# Patient Record
Sex: FEMALE | Race: BLACK OR AFRICAN AMERICAN | NOT HISPANIC OR LATINO | Employment: FULL TIME | URBAN - METROPOLITAN AREA
[De-identification: names, ages, dates, MRNs, and addresses within clinical notes are randomized per-mention and may not be internally consistent; named-entity substitution may affect disease eponyms.]

---

## 2018-10-07 ENCOUNTER — HOSPITAL ENCOUNTER (EMERGENCY)
Facility: HOSPITAL | Age: 23
Discharge: HOME | End: 2018-10-07
Attending: EMERGENCY MEDICINE | Admitting: EMERGENCY MEDICINE

## 2018-10-07 VITALS
BODY MASS INDEX: 23.78 KG/M2 | HEART RATE: 66 BPM | HEIGHT: 67 IN | SYSTOLIC BLOOD PRESSURE: 116 MMHG | RESPIRATION RATE: 16 BRPM | DIASTOLIC BLOOD PRESSURE: 77 MMHG | OXYGEN SATURATION: 100 % | TEMPERATURE: 97.4 F | WEIGHT: 151.5 LBS

## 2018-10-07 DIAGNOSIS — L30.9 ECZEMA OF BOTH HANDS: Primary | ICD-10-CM

## 2018-10-07 PROCEDURE — 99282 EMERGENCY DEPT VISIT SF MDM: CPT

## 2018-10-07 PROCEDURE — 25010000002 METHYLPREDNISOLONE PER 125 MG: Performed by: EMERGENCY MEDICINE

## 2018-10-07 PROCEDURE — 96372 THER/PROPH/DIAG INJ SC/IM: CPT

## 2018-10-07 RX ORDER — CEFIXIME 400 MG/1
400 CAPSULE ORAL DAILY
COMMUNITY
End: 2018-11-27

## 2018-10-07 RX ORDER — DIPHENHYDRAMINE HCL 25 MG
25 CAPSULE ORAL EVERY 6 HOURS PRN
Status: ON HOLD | COMMUNITY
End: 2020-06-04

## 2018-10-07 RX ORDER — METHYLPREDNISOLONE SODIUM SUCCINATE 125 MG/2ML
125 INJECTION, POWDER, LYOPHILIZED, FOR SOLUTION INTRAMUSCULAR; INTRAVENOUS ONCE
Status: COMPLETED | OUTPATIENT
Start: 2018-10-07 | End: 2018-10-07

## 2018-10-07 RX ORDER — VALACYCLOVIR HYDROCHLORIDE 500 MG/1
500 TABLET, FILM COATED ORAL DAILY
COMMUNITY
End: 2018-11-27 | Stop reason: SDUPTHER

## 2018-10-07 RX ORDER — METHYLPREDNISOLONE 4 MG/1
TABLET ORAL
Qty: 21 EACH | Refills: 0 | Status: SHIPPED | OUTPATIENT
Start: 2018-10-07 | End: 2018-11-27

## 2018-10-07 RX ADMIN — METHYLPREDNISOLONE SODIUM SUCCINATE 125 MG: 125 INJECTION, POWDER, FOR SOLUTION INTRAMUSCULAR; INTRAVENOUS at 20:12

## 2018-10-07 NOTE — DISCHARGE INSTRUCTIONS
Clean your hands with cool water and then pat your hands dry with a clean towel. Use cotton gloves when necessary.

## 2018-10-07 NOTE — ED PROVIDER NOTES
EMERGENCY DEPARTMENT ENCOUNTER    CHIEF COMPLAINT  Chief Complaint: rash  History given by: patient, mother  History limited by: nothing  Room Number:   PMD: Morro Laura MD      HPI:  Pt is a 22 y.o. female who presents complaining of a rash to her bilateral hands that has progressively becoming worse over the last 2 months. Pt states that her rash has become worse over the last 2-3 days. Pt states that her symptoms are similar to her prior eczema exacerbations but that it is more severe than her prior exacerbations. Pt denies fever, CP or SOA. Pt states that she has been taking fluocinonide ointment 2-3 times daily without relief or improvement of her rash. Pt states that she does wear gloves at work daily. LNMP started on 10/1/18  PCP started pt on Suprax on 10/4.    Duration/Onset/Timin months, gradual, constant  Location: bilateral hands  Radiation: none  Quality: rash  Intensity/Severity: moderate to severe  Associated Symptoms: none  Aggravating or Alleviating Factors: none  Previous Episodes:  Pt states that her symptoms are similar to her prior eczema exacerbations but that it is more severe than her prior exacerbations.       PAST MEDICAL HISTORY  Active Ambulatory Problems     Diagnosis Date Noted   • No Active Ambulatory Problems     Resolved Ambulatory Problems     Diagnosis Date Noted   • No Resolved Ambulatory Problems     Past Medical History:   Diagnosis Date   • Eczema of both hands        PAST SURGICAL HISTORY  History reviewed. No pertinent surgical history.    FAMILY HISTORY  History reviewed. No pertinent family history.    SOCIAL HISTORY  Social History     Social History   • Marital status: Single     Spouse name: N/A   • Number of children: N/A   • Years of education: N/A     Occupational History   • Not on file.     Social History Main Topics   • Smoking status: Never Smoker   • Smokeless tobacco: Never Used   • Alcohol use No   • Drug use: No   • Sexual activity: Defer      Other Topics Concern   • Not on file     Social History Narrative   • No narrative on file       ALLERGIES  Nuts; Amoxil [amoxicillin]; and Flagyl [metronidazole]    REVIEW OF SYSTEMS  Review of Systems   Constitutional: Negative for fever.   Respiratory: Negative for shortness of breath.    Cardiovascular: Negative for chest pain.   Skin: Positive for rash.       PHYSICAL EXAM  ED Triage Vitals [10/07/18 1910]   Temp Heart Rate Resp BP SpO2   97.4 °F (36.3 °C) 90 16 -- 100 %      Temp src Heart Rate Source Patient Position BP Location FiO2 (%)   Tympanic Monitor -- -- --       Physical Exam   Constitutional: No distress.   HENT:   Head: Normocephalic and atraumatic.   Mouth/Throat: Oropharynx is clear and moist.   No intraoral lesions   Cardiovascular: Regular rhythm.    Pulmonary/Chest: No respiratory distress.   Abdominal: There is no tenderness.   Musculoskeletal: She exhibits no tenderness.   Skin: Rash noted.   Skin thickening with moderate blistering and mild palmar ulcerations on bilateral hands. There is no clear cut evidence of infection. Exam is consistent with severe eczema   Nursing note and vitals reviewed.    PROCEDURES  Procedures      PROGRESS AND CONSULTS     1923- Notified pt and family that there is not a dermatologist that can come to the ED to evaluate the pt. Discussed the plan to administer a dose of steroids in the ED and to discharge the pt home with a prescription for steroids. I instructed the pt to clean her hands with cool water and then pat dry. D/w pt and family that I am concerned that her eczema has been exacerbated by wearing gloves at work. I instructed the pt to wear cloth gloves and to take the next several days off. I instructed the pt to call her dermatologist for follow up. Pt understands and agrees with the plan, all questions answered.    1931- Ordered solu-medrol for eczema.    MEDICAL DECISION MAKING  Results were reviewed/discussed with the patient and they were also  made aware of online access. Pt also made aware that follow up with PMD is necessary.     MDM  Number of Diagnoses or Management Options  Eczema of both hands:      Amount and/or Complexity of Data Reviewed  Decide to obtain previous medical records or to obtain history from someone other than the patient: yes  Obtain history from someone other than the patient: yes (mother)    Patient Progress  Patient progress: stable         DIAGNOSIS  Final diagnoses:   Eczema of both hands       DISPOSITION  DISCHARGE    Patient discharged in stable condition.    Reviewed implications of results, diagnosis, meds, responsibility to follow up, warning signs and symptoms of possible worsening, potential complications and reasons to return to ER.    Patient/Family voiced understanding of above instructions.    Discussed plan for discharge, as there is no emergent indication for admission. Patient referred to primary care provider for BP management due to today's BP. Pt/family is agreeable and understands need for follow up and repeat testing.  Pt is aware that discharge does not mean that nothing is wrong but it indicates no emergency is present that requires admission and they must continue care with follow-up as given below or physician of their choice.     FOLLOW-UP  Morro Laura MD  [address]  [facility] [address]  [facility] [id number] [phone]    Go to   as scheduled    Miguel Holguin MD  [address] [facility] [address]  [place] [id number]  [phone]      Call for Appointment         Medication List      New Prescriptions    MethylPREDNISolone 4 MG tablet  Commonly known as:  MEDROL (LISSETT)  Take as directed on package instructions.              Latest Documented Vital Signs:  As of 8:14 PM  BP- 108/73 HR- 97 Temp- 97.4 °F (36.3 °C) (Tympanic) O2 sat- 100%    --  Documentation assistance provided by michelle Abraham for Dr. Weaver.  Information recorded by the michelle was done at my direction and has been  verified and validated by me.     Irma Abraham  10/07/18 1942       Irma Abraham  10/07/18 2014       Omid Weaver MD  10/07/18 2015

## 2018-11-27 ENCOUNTER — OFFICE VISIT (OUTPATIENT)
Dept: SPORTS MEDICINE | Facility: CLINIC | Age: 23
End: 2018-11-27

## 2018-11-27 VITALS
DIASTOLIC BLOOD PRESSURE: 68 MMHG | WEIGHT: 153 LBS | HEART RATE: 79 BPM | BODY MASS INDEX: 24.01 KG/M2 | HEIGHT: 67 IN | SYSTOLIC BLOOD PRESSURE: 102 MMHG | OXYGEN SATURATION: 99 %

## 2018-11-27 DIAGNOSIS — A60.00 HERPES SIMPLEX INFECTION OF GENITOURINARY SYSTEM: ICD-10-CM

## 2018-11-27 DIAGNOSIS — G89.29 CHRONIC BILATERAL LOW BACK PAIN WITHOUT SCIATICA: ICD-10-CM

## 2018-11-27 DIAGNOSIS — M54.50 CHRONIC BILATERAL LOW BACK PAIN WITHOUT SCIATICA: ICD-10-CM

## 2018-11-27 DIAGNOSIS — L30.9 ECZEMA, UNSPECIFIED TYPE: Primary | ICD-10-CM

## 2018-11-27 DIAGNOSIS — Z91.018 NUT ALLERGY: ICD-10-CM

## 2018-11-27 DIAGNOSIS — L50.2 URTICARIA DUE TO COLD: ICD-10-CM

## 2018-11-27 PROCEDURE — 99204 OFFICE O/P NEW MOD 45 MIN: CPT | Performed by: FAMILY MEDICINE

## 2018-11-27 RX ORDER — NAPROXEN 500 MG/1
500 TABLET ORAL 2 TIMES DAILY PRN
Qty: 60 TABLET | Refills: 2 | Status: SHIPPED | OUTPATIENT
Start: 2018-11-27 | End: 2019-10-22

## 2018-11-27 RX ORDER — VALACYCLOVIR HYDROCHLORIDE 500 MG/1
500 TABLET, FILM COATED ORAL DAILY
Qty: 90 TABLET | Refills: 3 | Status: SHIPPED | OUTPATIENT
Start: 2018-11-27 | End: 2020-04-27

## 2018-11-27 RX ORDER — CETIRIZINE HYDROCHLORIDE 10 MG/1
10 TABLET ORAL DAILY
Qty: 90 TABLET | Refills: 3 | Status: SHIPPED | OUTPATIENT
Start: 2018-11-27 | End: 2020-03-13

## 2018-11-27 RX ORDER — EPINEPHRINE 0.3 MG/.3ML
INJECTION SUBCUTANEOUS
Qty: 1 EACH | Refills: 5 | Status: SHIPPED | OUTPATIENT
Start: 2018-11-27 | End: 2019-12-04 | Stop reason: SDUPTHER

## 2018-11-27 NOTE — PROGRESS NOTES
"Mary is a 22 y.o. year old female    Chief Complaint   Patient presents with   • Establish Care     need referral for allergist-epi pen       History of Present Illness   HPI   1. Chronic eczema in hands. Possible worsened with cold as well - cold urticaria. Established with derm as well - using zyrtec and pepcid as well. Also something else - maybe steroid.     2. Intermittent nausea and trouble with holding her appetite.    3. Needs to see gyn - normal screening plus history of stds to follow up on    4. Nut allergy - needs new epi-pen    5. H/o genital herpes - stable with suppression on valtrex, no outbreak in over a year    6. Chronic low back pain.  Bilateral, up and down the back.  Worse with twisting motions.    I have reviewed the patient's medical, family, and social history in detail and updated the computerized patient record.    Review of Systems   Constitutional: Negative.    Respiratory: Negative.    Cardiovascular: Negative.    Psychiatric/Behavioral: Negative.    All other systems reviewed and are negative.      /68   Pulse 79   Ht 170.2 cm (67\")   Wt 69.4 kg (153 lb)   LMP  (Within Weeks)   SpO2 99%   Breastfeeding? No   BMI 23.96 kg/m²      Physical Exam   Constitutional: She is oriented to person, place, and time. She appears well-developed and well-nourished.   Neck: Normal range of motion.   Cardiovascular: Normal rate, regular rhythm and normal heart sounds.   Pulmonary/Chest: Effort normal and breath sounds normal.   Musculoskeletal:   Back exam shows some very mild scoliosis with mild bilateral paraspinal tenderness.  Normal range of motion.   Neurological: She is alert and oriented to person, place, and time.   Skin: Skin is warm and dry.   Chronic eczematous changes bilateral hands with some plaque formation   Psychiatric: She has a normal mood and affect.   Vitals reviewed.       Diagnoses and all orders for this visit:    Eczema, unspecified type  -     cetirizine (zyrTEC) " 10 MG tablet; Take 1 tablet by mouth Daily.    Nut allergy  -     EPINEPHrine (EPIPEN 2-LISSETT) 0.3 MG/0.3ML solution auto-injector injection; Use as directed as needed for allergic reaction    Urticaria due to cold  -     cetirizine (zyrTEC) 10 MG tablet; Take 1 tablet by mouth Daily.    Herpes simplex infection of genitourinary system  -     valACYclovir (VALTREX) 500 MG tablet; Take 1 tablet by mouth Daily.  -     Ambulatory Referral to Obstetrics / Gynecology    Chronic bilateral low back pain without sciatica  -     naproxen (NAPROSYN) 500 MG tablet; Take 1 tablet by mouth 2 (Two) Times a Day As Needed for Moderate Pain .    Other orders  -     Crisaborole (EUCRISA) 2 % ointment; Apply  topically.       Recommend continued follow up with dermatology regarding eczema.  Continue nut avoidance.  EpiPen as needed.  Continue Valtrex for herpes suppression.  Recommend establish care with OB/GYN for long-term follow-up, screening, etc.  Back pain appears benign.  Naproxen when necessary.      EMR Dragon/Transcription disclaimer:    Much of this encounter note is an electronic transcription/translation of spoken language to printed text.  The electronic translation of spoken language may permit erroneous, or at times, nonsensical words or phrases to be inadvertently transcribed.  Although I have reviewed the note for such errors some may still exist.

## 2019-05-08 ENCOUNTER — OFFICE VISIT (OUTPATIENT)
Dept: SPORTS MEDICINE | Facility: CLINIC | Age: 24
End: 2019-05-08

## 2019-05-08 VITALS
SYSTOLIC BLOOD PRESSURE: 102 MMHG | HEIGHT: 67 IN | WEIGHT: 159 LBS | BODY MASS INDEX: 24.96 KG/M2 | DIASTOLIC BLOOD PRESSURE: 50 MMHG

## 2019-05-08 DIAGNOSIS — R41.840 DIFFICULTY CONCENTRATING: ICD-10-CM

## 2019-05-08 DIAGNOSIS — F41.9 ANXIETY: Primary | ICD-10-CM

## 2019-05-08 PROCEDURE — 99214 OFFICE O/P EST MOD 30 MIN: CPT | Performed by: FAMILY MEDICINE

## 2019-05-08 RX ORDER — HYDROXYZINE HYDROCHLORIDE 10 MG/1
10 TABLET, FILM COATED ORAL
COMMUNITY
End: 2019-10-22

## 2019-05-08 RX ORDER — CLOBETASOL PROPIONATE 0.5 MG/G
OINTMENT TOPICAL
COMMUNITY
Start: 2018-10-11 | End: 2019-10-22

## 2019-05-08 NOTE — PROGRESS NOTES
"Mary is a 23 y.o. year old female evaluation of a problem that is new to this examiner.    Chief Complaint   Patient presents with   • Anxiety     X 3 months,        History of Present Illness   HPI   Here today for complaint of anxiety.  Generalized, gradually worsening for a few months.  She states that she feels like she cannot sleep or rest, feels \"wired \"and fidgety all the time.  Her mood tends to oscillate up and down easily.  Associated with difficulty concentration.  Denies any menstrual or GI abnormalities.  She has chronic urticaria that is slightly worse than usual.  She reports some stress related to moving here from [place], but that was in August of last year and seems to be getting better.  She does admit to rather routine use of marijuana to help with this.  Denies use of other illicit substances.    I have reviewed the patient's medical, family, and social history in detail and updated the computerized patient record.    Review of Systems   Constitutional: Positive for appetite change. Negative for chills and fever.   Eyes: Negative.    Cardiovascular: Negative for palpitations.   Gastrointestinal: Negative for constipation and diarrhea.   Endocrine: Negative for cold intolerance and heat intolerance.   Skin: Positive for rash.   Psychiatric/Behavioral: The patient is nervous/anxious.        /50 (BP Location: Left arm, Patient Position: Sitting, Cuff Size: Adult)   Ht 170.2 cm (67.01\")   Wt 72.1 kg (159 lb)   BMI 24.90 kg/m²      Physical Exam   Constitutional: She appears well-developed and well-nourished.   HENT:   Mouth/Throat: Oropharynx is clear and moist.   Eyes: Conjunctivae and EOM are normal. Pupils are equal, round, and reactive to light.   Neck: Normal range of motion. No thyromegaly present.   Cardiovascular: Normal rate, regular rhythm and normal heart sounds.   Pulmonary/Chest: Effort normal and breath sounds normal.   Lymphadenopathy:     She has no cervical adenopathy. "   Psychiatric: Her mood appears anxious. Her speech is not delayed and not slurred. She is hyperactive. Thought content is not paranoid and not delusional. Cognition and memory are normal.   Speech is somewhat rapid but not pressured.  Mildly circumferential.   Nursing note and vitals reviewed.        Current Outpatient Medications:   •  cetirizine (zyrTEC) 10 MG tablet, Take 1 tablet by mouth Daily., Disp: 90 tablet, Rfl: 3  •  clobetasol (TEMOVATE) 0.05 % ointment, , Disp: , Rfl:   •  Crisaborole (EUCRISA) 2 % ointment, Apply  topically., Disp: , Rfl:   •  diphenhydrAMINE (BENADRYL) 25 mg capsule, Take 25 mg by mouth Every 6 (Six) Hours As Needed for Itching., Disp: , Rfl:   •  EPINEPHrine (EPIPEN 2-LISSETT) 0.3 MG/0.3ML solution auto-injector injection, Use as directed as needed for allergic reaction, Disp: 1 each, Rfl: 5  •  naproxen (NAPROSYN) 500 MG tablet, Take 1 tablet by mouth 2 (Two) Times a Day As Needed for Moderate Pain ., Disp: 60 tablet, Rfl: 2  •  valACYclovir (VALTREX) 500 MG tablet, Take 1 tablet by mouth Daily., Disp: 90 tablet, Rfl: 3  •  hydrOXYzine (ATARAX) 10 MG tablet, Take 10 mg by mouth., Disp: , Rfl:      Diagnoses and all orders for this visit:    Anxiety  -     CBC & Differential  -     Comprehensive Metabolic Panel  -     T4, Free  -     TSH  -     ToxASSURE Select 13 (MW) - Urine, Clean Catch  -     Urinalysis With Culture If Indicated - Urine, Clean Catch  -     Thyroid Antibodies  -     Ambulatory Referral to Psychiatry    Difficulty concentrating  -     CBC & Differential  -     Comprehensive Metabolic Panel  -     T4, Free  -     TSH  -     ToxASSURE Select 13 (MW) - Urine, Clean Catch  -     Urinalysis With Culture If Indicated - Urine, Clean Catch  -     Thyroid Antibodies  -     Ambulatory Referral to Psychiatry    Other orders  -     clobetasol (TEMOVATE) 0.05 % ointment  -     hydrOXYzine (ATARAX) 10 MG tablet; Take 10 mg by mouth.       She has features of anxiety, ADD, and even  some mild bipolar warning signs with her high energy level and limited sleep.  We will do some basic labs to rule out physiologic cause, but I would like to have her see psychiatry to be more cautious with managing this.      EMR Dragon/Transcription disclaimer:    Much of this encounter note is an electronic transcription/translation of spoken language to printed text.  The electronic translation of spoken language may permit erroneous, or at times, nonsensical words or phrases to be inadvertently transcribed.  Although I have reviewed the note for such errors some may still exist.

## 2019-05-15 LAB
ALBUMIN SERPL-MCNC: 4.3 G/DL (ref 3.5–5.2)
ALBUMIN/GLOB SERPL: 1.4 G/DL
ALP SERPL-CCNC: 52 U/L (ref 39–117)
ALT SERPL-CCNC: 14 U/L (ref 1–33)
APPEARANCE UR: CLEAR
AST SERPL-CCNC: 18 U/L (ref 1–32)
BACTERIA #/AREA URNS HPF: ABNORMAL /HPF
BASOPHILS # BLD AUTO: 0.02 10*3/MM3 (ref 0–0.2)
BASOPHILS NFR BLD AUTO: 0.3 % (ref 0–1.5)
BILIRUB SERPL-MCNC: 0.3 MG/DL (ref 0.2–1.2)
BILIRUB UR QL STRIP: NEGATIVE
BUN SERPL-MCNC: 12 MG/DL (ref 6–20)
BUN/CREAT SERPL: 13.3 (ref 7–25)
CALCIUM SERPL-MCNC: 9.8 MG/DL (ref 8.6–10.5)
CHLORIDE SERPL-SCNC: 101 MMOL/L (ref 98–107)
CO2 SERPL-SCNC: 24.7 MMOL/L (ref 22–29)
COLOR UR: YELLOW
CREAT SERPL-MCNC: 0.9 MG/DL (ref 0.57–1)
DRUGS UR: NORMAL
EOSINOPHIL # BLD AUTO: 0.19 10*3/MM3 (ref 0–0.4)
EOSINOPHIL NFR BLD AUTO: 3.2 % (ref 0.3–6.2)
EPI CELLS #/AREA URNS HPF: >10 /HPF
ERYTHROCYTE [DISTWIDTH] IN BLOOD BY AUTOMATED COUNT: 11.8 % (ref 12.3–15.4)
GLOBULIN SER CALC-MCNC: 3.1 GM/DL
GLUCOSE SERPL-MCNC: 89 MG/DL (ref 65–99)
GLUCOSE UR QL: NEGATIVE
HCT VFR BLD AUTO: 37.7 % (ref 34–46.6)
HGB BLD-MCNC: 12.4 G/DL (ref 12–15.9)
HGB UR QL STRIP: NEGATIVE
IMM GRANULOCYTES # BLD AUTO: 0.02 10*3/MM3 (ref 0–0.05)
IMM GRANULOCYTES NFR BLD AUTO: 0.3 % (ref 0–0.5)
KETONES UR QL STRIP: NEGATIVE
LEUKOCYTE ESTERASE UR QL STRIP: NEGATIVE
LYMPHOCYTES # BLD AUTO: 2.36 10*3/MM3 (ref 0.7–3.1)
LYMPHOCYTES NFR BLD AUTO: 39.3 % (ref 19.6–45.3)
MCH RBC QN AUTO: 30.8 PG (ref 26.6–33)
MCHC RBC AUTO-ENTMCNC: 32.9 G/DL (ref 31.5–35.7)
MCV RBC AUTO: 93.8 FL (ref 79–97)
MICRO URNS: NORMAL
MICRO URNS: NORMAL
MONOCYTES # BLD AUTO: 0.45 10*3/MM3 (ref 0.1–0.9)
MONOCYTES NFR BLD AUTO: 7.5 % (ref 5–12)
MUCOUS THREADS URNS QL MICRO: PRESENT /HPF
NEUTROPHILS # BLD AUTO: 2.96 10*3/MM3 (ref 1.7–7)
NEUTROPHILS NFR BLD AUTO: 49.4 % (ref 42.7–76)
NITRITE UR QL STRIP: NEGATIVE
NRBC BLD AUTO-RTO: 0 /100 WBC (ref 0–0.2)
PH UR STRIP: 6.5 [PH] (ref 5–7.5)
PLATELET # BLD AUTO: 203 10*3/MM3 (ref 140–450)
POTASSIUM SERPL-SCNC: 4.7 MMOL/L (ref 3.5–5.2)
PROT SERPL-MCNC: 7.4 G/DL (ref 6–8.5)
PROT UR QL STRIP: NORMAL
RBC # BLD AUTO: 4.02 10*6/MM3 (ref 3.77–5.28)
RBC #/AREA URNS HPF: ABNORMAL /HPF
SODIUM SERPL-SCNC: 139 MMOL/L (ref 136–145)
SP GR UR: 1.03 (ref 1–1.03)
T4 FREE SERPL-MCNC: 1.23 NG/DL (ref 0.93–1.7)
THYROGLOB AB SERPL-ACNC: <1 IU/ML (ref 0–0.9)
THYROPEROXIDASE AB SERPL-ACNC: 10 IU/ML (ref 0–34)
TSH SERPL DL<=0.005 MIU/L-ACNC: 1.09 MIU/ML (ref 0.27–4.2)
URINALYSIS REFLEX: NORMAL
UROBILINOGEN UR STRIP-MCNC: 0.2 MG/DL (ref 0.2–1)
WBC # BLD AUTO: 6 10*3/MM3 (ref 3.4–10.8)
WBC #/AREA URNS HPF: ABNORMAL /HPF

## 2019-10-10 ENCOUNTER — HOSPITAL ENCOUNTER (OUTPATIENT)
Dept: ULTRASOUND IMAGING | Facility: HOSPITAL | Age: 24
Discharge: HOME | End: 2019-10-10
Admitting: FAMILY MEDICINE

## 2019-10-10 ENCOUNTER — OFFICE VISIT (OUTPATIENT)
Dept: SPORTS MEDICINE | Facility: CLINIC | Age: 24
End: 2019-10-10

## 2019-10-10 ENCOUNTER — LAB (OUTPATIENT)
Dept: LAB | Facility: HOSPITAL | Age: 24
End: 2019-10-10

## 2019-10-10 VITALS
WEIGHT: 148 LBS | SYSTOLIC BLOOD PRESSURE: 112 MMHG | HEIGHT: 67 IN | BODY MASS INDEX: 23.23 KG/M2 | HEART RATE: 72 BPM | OXYGEN SATURATION: 98 % | DIASTOLIC BLOOD PRESSURE: 72 MMHG

## 2019-10-10 DIAGNOSIS — Z3A.01 LESS THAN 8 WEEKS GESTATION OF PREGNANCY: Primary | ICD-10-CM

## 2019-10-10 DIAGNOSIS — N83.201 RIGHT OVARIAN CYST: ICD-10-CM

## 2019-10-10 DIAGNOSIS — Z3A.01 LESS THAN 8 WEEKS GESTATION OF PREGNANCY: ICD-10-CM

## 2019-10-10 DIAGNOSIS — O26.891 ABDOMINAL PAIN IN PREGNANCY, FIRST TRIMESTER: ICD-10-CM

## 2019-10-10 DIAGNOSIS — R10.30 LOWER ABDOMINAL PAIN: Primary | ICD-10-CM

## 2019-10-10 DIAGNOSIS — R10.9 ABDOMINAL PAIN IN PREGNANCY, FIRST TRIMESTER: ICD-10-CM

## 2019-10-10 DIAGNOSIS — R10.30 LOWER ABDOMINAL PAIN: ICD-10-CM

## 2019-10-10 DIAGNOSIS — R10.9 ABDOMINAL PAIN, UNSPECIFIED ABDOMINAL LOCATION: ICD-10-CM

## 2019-10-10 LAB
B-HCG UR QL: POSITIVE
HCG INTACT+B SERPL-ACNC: NORMAL MIU/ML
INTERNAL NEGATIVE CONTROL: NEGATIVE
INTERNAL POSITIVE CONTROL: POSITIVE
Lab: ABNORMAL

## 2019-10-10 PROCEDURE — 36415 COLL VENOUS BLD VENIPUNCTURE: CPT

## 2019-10-10 PROCEDURE — 93976 VASCULAR STUDY: CPT

## 2019-10-10 PROCEDURE — 81025 URINE PREGNANCY TEST: CPT | Performed by: FAMILY MEDICINE

## 2019-10-10 PROCEDURE — 76815 OB US LIMITED FETUS(S): CPT

## 2019-10-10 PROCEDURE — 76817 TRANSVAGINAL US OBSTETRIC: CPT

## 2019-10-10 PROCEDURE — 99214 OFFICE O/P EST MOD 30 MIN: CPT | Performed by: FAMILY MEDICINE

## 2019-10-10 PROCEDURE — 84702 CHORIONIC GONADOTROPIN TEST: CPT

## 2019-10-10 RX ORDER — ONDANSETRON 4 MG/1
4 TABLET, ORALLY DISINTEGRATING ORAL EVERY 8 HOURS PRN
Qty: 10 TABLET | Refills: 0 | Status: SHIPPED | OUTPATIENT
Start: 2019-10-10 | End: 2019-10-14 | Stop reason: SDUPTHER

## 2019-10-10 NOTE — PROGRESS NOTES
"Mary is a 23 y.o. year old female evaluation of a problem that is new to this examiner.    Chief Complaint   Patient presents with   • Abdominal Cramping     x 4 days    • Constipation   • Nausea     some vomitting       History of Present Illness   HPI   Lower abdominal pain, cramping in nature, for a few days as well as nausea with vomiting. Hungry but vomiting.  Moderately severe.  LMP end of August - positive pregnancy test Friday.   Cramping is severe, feels like a menstrual cramp. Intermittent.   Assoc with hot flashing symptoms but not a full fever.     I have reviewed the patient's medical, family, and social history in detail and updated the computerized patient record.    Review of Systems   Constitutional: Negative.    Respiratory: Negative.    Genitourinary: Positive for pelvic pain.   Psychiatric/Behavioral: Negative.        /72   Pulse 72   Ht 170.2 cm (67.01\")   Wt 67.1 kg (148 lb)   SpO2 98%   BMI 23.17 kg/m²      Physical Exam   Constitutional: She is oriented to person, place, and time. She appears well-developed and well-nourished.   HENT:   Mouth/Throat: Oropharynx is clear and moist.   Pulmonary/Chest: Effort normal.   Abdominal: Soft. Bowel sounds are normal. There is no hepatosplenomegaly. There is tenderness in the right lower quadrant, suprapubic area and left lower quadrant. There is no rigidity, no rebound, no guarding, no CVA tenderness, no tenderness at McBurney's point and negative Ramirez's sign.   Neurological: She is alert and oriented to person, place, and time.   Psychiatric:   Anxious but not in distress   Nursing note and vitals reviewed.        Current Outpatient Medications:   •  cetirizine (zyrTEC) 10 MG tablet, Take 1 tablet by mouth Daily., Disp: 90 tablet, Rfl: 3  •  clobetasol (TEMOVATE) 0.05 % ointment, , Disp: , Rfl:   •  Crisaborole (EUCRISA) 2 % ointment, Apply  topically., Disp: , Rfl:   •  diphenhydrAMINE (BENADRYL) 25 mg capsule, Take 25 mg by mouth Every " 6 (Six) Hours As Needed for Itching., Disp: , Rfl:   •  EPINEPHrine (EPIPEN 2-LISSETT) 0.3 MG/0.3ML solution auto-injector injection, Use as directed as needed for allergic reaction, Disp: 1 each, Rfl: 5  •  hydrOXYzine (ATARAX) 10 MG tablet, Take 10 mg by mouth., Disp: , Rfl:   •  naproxen (NAPROSYN) 500 MG tablet, Take 1 tablet by mouth 2 (Two) Times a Day As Needed for Moderate Pain ., Disp: 60 tablet, Rfl: 2  •  valACYclovir (VALTREX) 500 MG tablet, Take 1 tablet by mouth Daily., Disp: 90 tablet, Rfl: 3  •  ondansetron ODT (ZOFRAN ODT) 4 MG disintegrating tablet, Take 1 tablet by mouth Every 8 (Eight) Hours As Needed for Nausea or Vomiting., Disp: 10 tablet, Rfl: 0     Diagnoses and all orders for this visit:    Lower abdominal pain  -     POCT pregnancy, urine  -     Cancel: US pelvis complete; Future  -     HCG, B-subunit, Quantitative  -     Cancel: US non-ob transvaginal; Future  -     US testicular or ovarian vascular limited; Future    Less than 8 weeks gestation of pregnancy  -     Cancel: US pelvis complete; Future  -     HCG, B-subunit, Quantitative  -     Cancel: US non-ob transvaginal; Future  -     US testicular or ovarian vascular limited; Future    Other orders  -     ondansetron ODT (ZOFRAN ODT) 4 MG disintegrating tablet; Take 1 tablet by mouth Every 8 (Eight) Hours As Needed for Nausea or Vomiting.       Urine pregnancy test positive.  We will arrange a stat pelvic ultrasound to evaluate for possible impending spontaneous  versus ectopic pregnancy.  Also quantitative hCG.  We will arrange definitive obstetric care as well.  In the interim we will use minimal Zofran for nausea relief and Tylenol.      EMR Dragon/Transcription disclaimer:    Much of this encounter note is an electronic transcription/translation of spoken language to printed text.  The electronic translation of spoken language may permit erroneous, or at times, nonsensical words or phrases to be inadvertently transcribed.   Although I have reviewed the note for such errors some may still exist.

## 2019-10-11 ENCOUNTER — TELEPHONE (OUTPATIENT)
Dept: SPORTS MEDICINE | Facility: CLINIC | Age: 24
End: 2019-10-11

## 2019-10-11 LAB — HCG INTACT+B SERPL-ACNC: NORMAL MIU/ML

## 2019-10-11 NOTE — TELEPHONE ENCOUNTER
Pt's mother called for pt, states she is having constipation would like to know what she can take for this please advise. KENISHA

## 2019-10-14 ENCOUNTER — TELEPHONE (OUTPATIENT)
Dept: OBSTETRICS AND GYNECOLOGY | Age: 24
End: 2019-10-14

## 2019-10-14 RX ORDER — ONDANSETRON 4 MG/1
TABLET, ORALLY DISINTEGRATING ORAL
Qty: 10 TABLET | Refills: 0 | Status: SHIPPED | OUTPATIENT
Start: 2019-10-14 | End: 2019-10-22 | Stop reason: SDUPTHER

## 2019-10-14 NOTE — TELEPHONE ENCOUNTER
Pt scheduled for new gyn/preg confirmation 10/22/19 at 945 & u/s at 930. Pt states her discomfort has subsided but wanted to be seen as possible. LMP 8/25/19    Referral received for NGYN pt less than 8 wks gestational, h/o right cyst w discomfort.    Referred by:  Morro Guan    Pt was scheduled w [facility] and plans to cancel that appt as she perfers to seek ob care w [facility] instead.

## 2019-10-22 ENCOUNTER — OFFICE VISIT (OUTPATIENT)
Dept: OBSTETRICS AND GYNECOLOGY | Age: 24
End: 2019-10-22

## 2019-10-22 ENCOUNTER — PROCEDURE VISIT (OUTPATIENT)
Dept: OBSTETRICS AND GYNECOLOGY | Age: 24
End: 2019-10-22

## 2019-10-22 ENCOUNTER — TELEPHONE (OUTPATIENT)
Dept: OBSTETRICS AND GYNECOLOGY | Age: 24
End: 2019-10-22

## 2019-10-22 VITALS
HEIGHT: 67 IN | DIASTOLIC BLOOD PRESSURE: 74 MMHG | WEIGHT: 149 LBS | BODY MASS INDEX: 23.39 KG/M2 | SYSTOLIC BLOOD PRESSURE: 104 MMHG

## 2019-10-22 DIAGNOSIS — Z11.3 SCREEN FOR STD (SEXUALLY TRANSMITTED DISEASE): ICD-10-CM

## 2019-10-22 DIAGNOSIS — O98.319 GENITAL HERPES AFFECTING PREGNANCY, ANTEPARTUM: ICD-10-CM

## 2019-10-22 DIAGNOSIS — Z11.4 ENCOUNTER FOR SCREENING FOR HIV: ICD-10-CM

## 2019-10-22 DIAGNOSIS — Z36.9 ANTENATAL SCREENING ENCOUNTER: ICD-10-CM

## 2019-10-22 DIAGNOSIS — Z3A.01 7 WEEKS GESTATION OF PREGNANCY: ICD-10-CM

## 2019-10-22 DIAGNOSIS — Z34.01 ENCOUNTER FOR SUPERVISION OF NORMAL FIRST PREGNANCY IN FIRST TRIMESTER: Primary | ICD-10-CM

## 2019-10-22 DIAGNOSIS — A60.09 GENITAL HERPES AFFECTING PREGNANCY, ANTEPARTUM: ICD-10-CM

## 2019-10-22 DIAGNOSIS — N83.201 CYST OF RIGHT OVARY: ICD-10-CM

## 2019-10-22 DIAGNOSIS — O36.80X0 ENCOUNTER TO DETERMINE FETAL VIABILITY OF PREGNANCY, SINGLE OR UNSPECIFIED FETUS: Primary | ICD-10-CM

## 2019-10-22 PROCEDURE — 99203 OFFICE O/P NEW LOW 30 MIN: CPT | Performed by: OBSTETRICS & GYNECOLOGY

## 2019-10-22 PROCEDURE — 76817 TRANSVAGINAL US OBSTETRIC: CPT | Performed by: OBSTETRICS & GYNECOLOGY

## 2019-10-22 RX ORDER — TRIAMCINOLONE ACETONIDE 1 MG/G
CREAM TOPICAL
Refills: 1 | COMMUNITY
Start: 2019-10-14 | End: 2019-10-22

## 2019-10-22 RX ORDER — PRENATAL VIT/IRON FUM/FOLIC AC 27MG-0.8MG
TABLET ORAL DAILY
Status: ON HOLD | COMMUNITY
End: 2020-06-04

## 2019-10-22 RX ORDER — ONDANSETRON 4 MG/1
TABLET, ORALLY DISINTEGRATING ORAL
Qty: 10 TABLET | Refills: 0 | Status: ON HOLD | OUTPATIENT
Start: 2019-10-22 | End: 2020-06-04

## 2019-10-22 NOTE — PROGRESS NOTES
GYN Visit    10/22/2019    Patient: Mary Sun          MR#:[id number]      Chief Complaint   Patient presents with   • Possible Pregnancy     U/S WITH PURA.  LMP 2019.  KELI 2020.  7 WEEKS GESTATION.        History of Present Illness    23 y.o. female  who presents for  New pt , new pregnancy confirmation  Some nausea and fatigue  One episode of pelvic pain that resolved  Here with mother  FOB not involved  History of herpes and does take valtrex as needed  Discussed daily valtrex at the end of pregnancy  Pap last year normal per pt report    Works as [employer]          Patient's last menstrual period was 2019.    ________________________________________  Patient Active Problem List   Diagnosis   • Genital herpes affecting pregnancy, antepartum       Past Medical History:   Diagnosis Date   • Eczema of both hands        History reviewed. No pertinent surgical history.    Social History     Tobacco Use   Smoking Status Former Smoker   Smokeless Tobacco Never Used   Tobacco Comment    occasional use        has a current medication list which includes the following prescription(s): cetirizine, ondansetron odt, prenatal vitamin 27-0.8, valacyclovir, diphenhydramine, and epinephrine.  ________________________________________    Current contraception: none      The following portions of the patient's history were reviewed and updated as appropriate: allergies, current medications, past family history, past medical history, past social history, past surgical history and problem list.    Review of Systems   Constitutional: Positive for fatigue.   Gastrointestinal: Positive for nausea. Negative for vomiting.   Genitourinary: Negative for pelvic pain, vaginal bleeding and vaginal discharge.   Neurological: Negative for light-headedness.   Psychiatric/Behavioral: Negative for dysphoric mood.   All other systems reviewed and are negative.      Pertinent items are noted in HPI.  "    Objective   Physical Exam    /74   Ht 170.2 cm (67.01\")   Wt 67.6 kg (149 lb)   LMP 2019   Breastfeeding? No   BMI 23.33 kg/m²    BP Readings from Last 3 Encounters:   10/22/19 104/74   10/10/19 112/72   19 102/50      Wt Readings from Last 3 Encounters:   10/22/19 67.6 kg (149 lb)   10/10/19 67.1 kg (148 lb)   19 72.1 kg (159 lb)      BMI: Estimated body mass index is 23.33 kg/m² as calculated from the following:    Height as of this encounter: 170.2 cm (67.01\").    Weight as of this encounter: 67.6 kg (149 lb).    Lungs: non labored breathing, no wheezing or tachpnea  Extremities: extremities normal, atraumatic, no cyanosis or edema  Skin: Skin color, texture, turgor normal. No rashes or lesions  Neurologic: Grossly normal  General:   alert, appears stated age and cooperative   Abdomen: soft, non-tender, without masses or organomegaly       Vulva: normal   Vagina: normal mucosa   Cervix: no cervical motion tenderness   Uterus: size consistent with 8 weeks   Adnexa: no mass, fullness, tenderness     US done, see result, viable IUP at 8 weeks  Right 9 x 7 cm simple ovarian cyst    Assessment:      Mary was seen today for possible pregnancy.    Diagnoses and all orders for this visit:    Encounter for supervision of normal first pregnancy in first trimester  -     OB Panel With HIV  -     Varicella Zoster Antibody, IgG  -     Urine Culture - Urine, Urine, Clean Catch  -     Drug Profile Urine - 9 Drugs - Urine, Clean Catch  -     Chlamydia trachomatis, Neisseria gonorrhoeae, PCR - Urine, Urine, Clean Catch    7 weeks gestation of pregnancy  -     OB Panel With HIV  -     Varicella Zoster Antibody, IgG  -     Urine Culture - Urine, Urine, Clean Catch  -     Drug Profile Urine - 9 Drugs - Urine, Clean Catch  -     Chlamydia trachomatis, Neisseria gonorrhoeae, PCR - Urine, Urine, Clean Catch    Encounter for screening for HIV  -     OB Panel With HIV     screening encounter  -  "    OB Panel With HIV  -     Varicella Zoster Antibody, IgG  -     Urine Culture - Urine, Urine, Clean Catch  -     Drug Profile Urine - 9 Drugs - Urine, Clean Catch  -     Chlamydia trachomatis, Neisseria gonorrhoeae, PCR - Urine, Urine, Clean Catch    Screen for STD (sexually transmitted disease)  -     Chlamydia trachomatis, Neisseria gonorrhoeae, PCR - Urine, Urine, Clean Catch    Cyst of right ovary    Genital herpes affecting pregnancy, antepartum      Discussed ovarian cyst- recheck in 1 month, torsion warnings  NIPS and hemoglobin electrophoresis at fu

## 2019-10-23 ENCOUNTER — TELEPHONE (OUTPATIENT)
Dept: SPORTS MEDICINE | Facility: CLINIC | Age: 24
End: 2019-10-23

## 2019-10-23 LAB
ABO GROUP BLD: (no result)
BASOPHILS # BLD AUTO: 0 X10E3/UL (ref 0–0.2)
BASOPHILS NFR BLD AUTO: 0 %
BLD GP AB SCN SERPL QL: NEGATIVE
EOSINOPHIL # BLD AUTO: 0.2 X10E3/UL (ref 0–0.4)
EOSINOPHIL NFR BLD AUTO: 3 %
ERYTHROCYTE [DISTWIDTH] IN BLOOD BY AUTOMATED COUNT: 12.3 % (ref 12.3–15.4)
HBV SURFACE AG SERPL QL IA: NEGATIVE
HCT VFR BLD AUTO: 33.2 % (ref 34–46.6)
HCV AB S/CO SERPL IA: 0.2 S/CO RATIO (ref 0–0.9)
HGB BLD-MCNC: 11.7 G/DL (ref 11.1–15.9)
HIV 1+2 AB+HIV1 P24 AG SERPL QL IA: NON REACTIVE
IMM GRANULOCYTES # BLD AUTO: 0 X10E3/UL (ref 0–0.1)
IMM GRANULOCYTES NFR BLD AUTO: 0 %
LYMPHOCYTES # BLD AUTO: 1.8 X10E3/UL (ref 0.7–3.1)
LYMPHOCYTES NFR BLD AUTO: 27 %
MCH RBC QN AUTO: 31 PG (ref 26.6–33)
MCHC RBC AUTO-ENTMCNC: 35.2 G/DL (ref 31.5–35.7)
MCV RBC AUTO: 88 FL (ref 79–97)
MONOCYTES # BLD AUTO: 0.6 X10E3/UL (ref 0.1–0.9)
MONOCYTES NFR BLD AUTO: 9 %
NEUTROPHILS # BLD AUTO: 4.1 X10E3/UL (ref 1.4–7)
NEUTROPHILS NFR BLD AUTO: 61 %
PLATELET # BLD AUTO: 211 X10E3/UL (ref 150–450)
RBC # BLD AUTO: 3.77 X10E6/UL (ref 3.77–5.28)
RH BLD: NEGATIVE
RPR SER QL: NON REACTIVE
RUBV IGG SERPL IA-ACNC: 2.65 INDEX
VZV IGG SER IA-ACNC: 351 INDEX
WBC # BLD AUTO: 6.7 X10E3/UL (ref 3.4–10.8)

## 2019-10-23 NOTE — TELEPHONE ENCOUNTER
Patient was been scheduled for 10/28/19 at 8:30 am with Dr. Saadia [facility] for 8 week confirmation and cyst follow up, per mothers request since this office is in network with insurance.   Patient will continue care of pregnancy with Dr. Saadia [facility].  Patients mother is aware of appt.       Patients mother was unhappy that Dr. Josette Hope was out of network with patients insurance. I explained to her that it was the scheduling office responsibility to check if doctor was in network. She was advised to call Dr. Hope office to have any billing issues straighten out. I proceeded with scheduling daughter with an in network provider, Dr. aSadia [facility].

## 2019-10-24 LAB
BACTERIA UR CULT: NO GROWTH
BACTERIA UR CULT: NORMAL
C TRACH RRNA SPEC QL NAA+PROBE: NEGATIVE
N GONORRHOEA RRNA SPEC QL NAA+PROBE: NEGATIVE

## 2019-10-25 RX ORDER — CLOBETASOL PROPIONATE 0.5 MG/G
CREAM TOPICAL 2 TIMES DAILY
COMMUNITY
End: 2020-03-13

## 2019-10-28 ENCOUNTER — TELEPHONE (OUTPATIENT)
Dept: OBSTETRICS AND GYNECOLOGY | Age: 24
End: 2019-10-28

## 2019-10-28 LAB
AMPHETAMINES UR QL SCN: NEGATIVE NG/ML
BARBITURATES UR QL SCN: NEGATIVE NG/ML
BENZODIAZ UR QL: NEGATIVE NG/ML
BZE UR QL: NEGATIVE NG/ML
CANNABINOIDS UR QL SCN: POSITIVE
METHADONE UR QL SCN: NEGATIVE NG/ML
OPIATES UR QL: NEGATIVE NG/ML
PCP UR QL: NEGATIVE NG/ML
PROPOXYPH UR QL SCN: NEGATIVE NG/ML

## 2019-11-26 ENCOUNTER — HOSPITAL ENCOUNTER (EMERGENCY)
Facility: HOSPITAL | Age: 24
Discharge: HOME | End: 2019-11-26
Attending: EMERGENCY MEDICINE | Admitting: EMERGENCY MEDICINE

## 2019-11-26 VITALS
BODY MASS INDEX: 23.3 KG/M2 | SYSTOLIC BLOOD PRESSURE: 117 MMHG | TEMPERATURE: 97.8 F | OXYGEN SATURATION: 100 % | RESPIRATION RATE: 18 BRPM | HEIGHT: 66 IN | HEART RATE: 75 BPM | DIASTOLIC BLOOD PRESSURE: 76 MMHG | WEIGHT: 145 LBS

## 2019-11-26 DIAGNOSIS — O21.9 NAUSEA AND VOMITING IN PREGNANCY: Primary | ICD-10-CM

## 2019-11-26 LAB
ALBUMIN SERPL-MCNC: 4.6 G/DL (ref 3.5–5.2)
ALBUMIN/GLOB SERPL: 1.4 G/DL
ALP SERPL-CCNC: 42 U/L (ref 39–117)
ALT SERPL W P-5'-P-CCNC: 15 U/L (ref 1–33)
ANION GAP SERPL CALCULATED.3IONS-SCNC: 14.5 MMOL/L (ref 5–15)
AST SERPL-CCNC: 18 U/L (ref 1–32)
BACTERIA UR QL AUTO: ABNORMAL /HPF
BASOPHILS # BLD AUTO: 0.02 10*3/MM3 (ref 0–0.2)
BASOPHILS NFR BLD AUTO: 0.3 % (ref 0–1.5)
BILIRUB SERPL-MCNC: 0.4 MG/DL (ref 0.2–1.2)
BILIRUB UR QL STRIP: NEGATIVE
BUN BLD-MCNC: 7 MG/DL (ref 6–20)
BUN/CREAT SERPL: 13.7 (ref 7–25)
CALCIUM SPEC-SCNC: 9.3 MG/DL (ref 8.6–10.5)
CHLORIDE SERPL-SCNC: 101 MMOL/L (ref 98–107)
CLARITY UR: ABNORMAL
CO2 SERPL-SCNC: 22.5 MMOL/L (ref 22–29)
COLOR UR: ABNORMAL
CREAT BLD-MCNC: 0.51 MG/DL (ref 0.57–1)
DEPRECATED RDW RBC AUTO: 41 FL (ref 37–54)
EOSINOPHIL # BLD AUTO: 0.07 10*3/MM3 (ref 0–0.4)
EOSINOPHIL NFR BLD AUTO: 0.9 % (ref 0.3–6.2)
ERYTHROCYTE [DISTWIDTH] IN BLOOD BY AUTOMATED COUNT: 12.2 % (ref 12.3–15.4)
GFR SERPL CREATININE-BSD FRML MDRD: >150 ML/MIN/1.73
GLOBULIN UR ELPH-MCNC: 3.3 GM/DL
GLUCOSE BLD-MCNC: 90 MG/DL (ref 65–99)
GLUCOSE UR STRIP-MCNC: NEGATIVE MG/DL
HCG INTACT+B SERPL-ACNC: NORMAL MIU/ML
HCT VFR BLD AUTO: 34.9 % (ref 34–46.6)
HGB BLD-MCNC: 12 G/DL (ref 12–15.9)
HGB UR QL STRIP.AUTO: NEGATIVE
HOLD SPECIMEN: NORMAL
HOLD SPECIMEN: NORMAL
HYALINE CASTS UR QL AUTO: ABNORMAL /LPF
IMM GRANULOCYTES # BLD AUTO: 0.04 10*3/MM3 (ref 0–0.05)
IMM GRANULOCYTES NFR BLD AUTO: 0.5 % (ref 0–0.5)
KETONES UR QL STRIP: ABNORMAL
LEUKOCYTE ESTERASE UR QL STRIP.AUTO: ABNORMAL
LIPASE SERPL-CCNC: 31 U/L (ref 13–60)
LYMPHOCYTES # BLD AUTO: 1.9 10*3/MM3 (ref 0.7–3.1)
LYMPHOCYTES NFR BLD AUTO: 25.7 % (ref 19.6–45.3)
MCH RBC QN AUTO: 31.7 PG (ref 26.6–33)
MCHC RBC AUTO-ENTMCNC: 34.4 G/DL (ref 31.5–35.7)
MCV RBC AUTO: 92.3 FL (ref 79–97)
MONOCYTES # BLD AUTO: 0.54 10*3/MM3 (ref 0.1–0.9)
MONOCYTES NFR BLD AUTO: 7.3 % (ref 5–12)
NEUTROPHILS # BLD AUTO: 4.81 10*3/MM3 (ref 1.7–7)
NEUTROPHILS NFR BLD AUTO: 65.3 % (ref 42.7–76)
NITRITE UR QL STRIP: NEGATIVE
NRBC BLD AUTO-RTO: 0 /100 WBC (ref 0–0.2)
PH UR STRIP.AUTO: 6 [PH] (ref 5–8)
PLATELET # BLD AUTO: 197 10*3/MM3 (ref 140–450)
PMV BLD AUTO: 10.6 FL (ref 6–12)
POTASSIUM BLD-SCNC: 3.3 MMOL/L (ref 3.5–5.2)
PROT SERPL-MCNC: 7.9 G/DL (ref 6–8.5)
PROT UR QL STRIP: ABNORMAL
RBC # BLD AUTO: 3.78 10*6/MM3 (ref 3.77–5.28)
RBC # UR: ABNORMAL /HPF
REF LAB TEST METHOD: ABNORMAL
SODIUM BLD-SCNC: 138 MMOL/L (ref 136–145)
SP GR UR STRIP: >=1.03 (ref 1–1.03)
SQUAMOUS #/AREA URNS HPF: ABNORMAL /HPF
UROBILINOGEN UR QL STRIP: ABNORMAL
WBC NRBC COR # BLD: 7.38 10*3/MM3 (ref 3.4–10.8)
WBC UR QL AUTO: ABNORMAL /HPF
WHOLE BLOOD HOLD SPECIMEN: NORMAL
WHOLE BLOOD HOLD SPECIMEN: NORMAL

## 2019-11-26 PROCEDURE — 84702 CHORIONIC GONADOTROPIN TEST: CPT | Performed by: EMERGENCY MEDICINE

## 2019-11-26 PROCEDURE — 81001 URINALYSIS AUTO W/SCOPE: CPT | Performed by: EMERGENCY MEDICINE

## 2019-11-26 PROCEDURE — 96374 THER/PROPH/DIAG INJ IV PUSH: CPT

## 2019-11-26 PROCEDURE — 85025 COMPLETE CBC W/AUTO DIFF WBC: CPT | Performed by: EMERGENCY MEDICINE

## 2019-11-26 PROCEDURE — 25010000002 PROMETHAZINE PER 50 MG: Performed by: EMERGENCY MEDICINE

## 2019-11-26 PROCEDURE — 80053 COMPREHEN METABOLIC PANEL: CPT | Performed by: EMERGENCY MEDICINE

## 2019-11-26 PROCEDURE — 83690 ASSAY OF LIPASE: CPT | Performed by: EMERGENCY MEDICINE

## 2019-11-26 PROCEDURE — 99283 EMERGENCY DEPT VISIT LOW MDM: CPT

## 2019-11-26 PROCEDURE — 87076 CULTURE ANAEROBE IDENT EACH: CPT | Performed by: EMERGENCY MEDICINE

## 2019-11-26 PROCEDURE — 87086 URINE CULTURE/COLONY COUNT: CPT | Performed by: EMERGENCY MEDICINE

## 2019-11-26 RX ORDER — PROMETHAZINE HYDROCHLORIDE 25 MG/1
25 TABLET ORAL EVERY 6 HOURS PRN
Qty: 20 TABLET | Refills: 0 | Status: SHIPPED | OUTPATIENT
Start: 2019-11-26 | End: 2020-03-13

## 2019-11-26 RX ORDER — PROMETHAZINE HYDROCHLORIDE 25 MG/ML
12.5 INJECTION, SOLUTION INTRAMUSCULAR; INTRAVENOUS ONCE
Status: COMPLETED | OUTPATIENT
Start: 2019-11-26 | End: 2019-11-26

## 2019-11-26 RX ADMIN — SODIUM CHLORIDE, POTASSIUM CHLORIDE, SODIUM LACTATE AND CALCIUM CHLORIDE 1000 ML: 600; 310; 30; 20 INJECTION, SOLUTION INTRAVENOUS at 08:37

## 2019-11-26 RX ADMIN — PROMETHAZINE HYDROCHLORIDE 12.5 MG: 25 INJECTION INTRAMUSCULAR; INTRAVENOUS at 08:39

## 2019-11-28 LAB — BACTERIA SPEC AEROBE CULT: ABNORMAL

## 2019-12-04 ENCOUNTER — HOSPITAL ENCOUNTER (EMERGENCY)
Facility: HOSPITAL | Age: 24
Discharge: HOME | End: 2019-12-04
Attending: EMERGENCY MEDICINE | Admitting: EMERGENCY MEDICINE

## 2019-12-04 ENCOUNTER — TELEPHONE (OUTPATIENT)
Dept: SPORTS MEDICINE | Facility: CLINIC | Age: 24
End: 2019-12-04

## 2019-12-04 VITALS
WEIGHT: 160.2 LBS | TEMPERATURE: 97.8 F | HEIGHT: 66 IN | DIASTOLIC BLOOD PRESSURE: 69 MMHG | OXYGEN SATURATION: 100 % | RESPIRATION RATE: 18 BRPM | HEART RATE: 112 BPM | SYSTOLIC BLOOD PRESSURE: 104 MMHG | BODY MASS INDEX: 25.75 KG/M2

## 2019-12-04 DIAGNOSIS — T78.3XXA ANGIOEDEMA OF LIPS, INITIAL ENCOUNTER: Primary | ICD-10-CM

## 2019-12-04 DIAGNOSIS — Z91.018 NUT ALLERGY: ICD-10-CM

## 2019-12-04 LAB
ANION GAP SERPL CALCULATED.3IONS-SCNC: 12.2 MMOL/L (ref 5–15)
BASOPHILS # BLD AUTO: 0.02 10*3/MM3 (ref 0–0.2)
BASOPHILS NFR BLD AUTO: 0.3 % (ref 0–1.5)
BUN BLD-MCNC: 7 MG/DL (ref 6–20)
BUN/CREAT SERPL: 11.3 (ref 7–25)
CALCIUM SPEC-SCNC: 9 MG/DL (ref 8.6–10.5)
CHLORIDE SERPL-SCNC: 101 MMOL/L (ref 98–107)
CO2 SERPL-SCNC: 24.8 MMOL/L (ref 22–29)
CREAT BLD-MCNC: 0.62 MG/DL (ref 0.57–1)
DEPRECATED RDW RBC AUTO: 41.9 FL (ref 37–54)
EOSINOPHIL # BLD AUTO: 0.09 10*3/MM3 (ref 0–0.4)
EOSINOPHIL NFR BLD AUTO: 1.2 % (ref 0.3–6.2)
ERYTHROCYTE [DISTWIDTH] IN BLOOD BY AUTOMATED COUNT: 12.2 % (ref 12.3–15.4)
GFR SERPL CREATININE-BSD FRML MDRD: 146 ML/MIN/1.73
GLUCOSE BLD-MCNC: 80 MG/DL (ref 65–99)
HCT VFR BLD AUTO: 33.7 % (ref 34–46.6)
HGB BLD-MCNC: 11.5 G/DL (ref 12–15.9)
HOLD SPECIMEN: NORMAL
HOLD SPECIMEN: NORMAL
IMM GRANULOCYTES # BLD AUTO: 0.05 10*3/MM3 (ref 0–0.05)
IMM GRANULOCYTES NFR BLD AUTO: 0.6 % (ref 0–0.5)
LYMPHOCYTES # BLD AUTO: 1.96 10*3/MM3 (ref 0.7–3.1)
LYMPHOCYTES NFR BLD AUTO: 25.2 % (ref 19.6–45.3)
MCH RBC QN AUTO: 31.8 PG (ref 26.6–33)
MCHC RBC AUTO-ENTMCNC: 34.1 G/DL (ref 31.5–35.7)
MCV RBC AUTO: 93.1 FL (ref 79–97)
MONOCYTES # BLD AUTO: 0.6 10*3/MM3 (ref 0.1–0.9)
MONOCYTES NFR BLD AUTO: 7.7 % (ref 5–12)
NEUTROPHILS # BLD AUTO: 5.05 10*3/MM3 (ref 1.7–7)
NEUTROPHILS NFR BLD AUTO: 65 % (ref 42.7–76)
NRBC BLD AUTO-RTO: 0 /100 WBC (ref 0–0.2)
PLATELET # BLD AUTO: 199 10*3/MM3 (ref 140–450)
PMV BLD AUTO: 10.7 FL (ref 6–12)
POTASSIUM BLD-SCNC: 3.7 MMOL/L (ref 3.5–5.2)
RBC # BLD AUTO: 3.62 10*6/MM3 (ref 3.77–5.28)
SODIUM BLD-SCNC: 138 MMOL/L (ref 136–145)
WBC NRBC COR # BLD: 7.77 10*3/MM3 (ref 3.4–10.8)
WHOLE BLOOD HOLD SPECIMEN: NORMAL
WHOLE BLOOD HOLD SPECIMEN: NORMAL

## 2019-12-04 PROCEDURE — 96374 THER/PROPH/DIAG INJ IV PUSH: CPT

## 2019-12-04 PROCEDURE — 80048 BASIC METABOLIC PNL TOTAL CA: CPT | Performed by: EMERGENCY MEDICINE

## 2019-12-04 PROCEDURE — 96375 TX/PRO/DX INJ NEW DRUG ADDON: CPT

## 2019-12-04 PROCEDURE — 25010000002 DIPHENHYDRAMINE PER 50 MG: Performed by: EMERGENCY MEDICINE

## 2019-12-04 PROCEDURE — 99283 EMERGENCY DEPT VISIT LOW MDM: CPT

## 2019-12-04 PROCEDURE — 85025 COMPLETE CBC W/AUTO DIFF WBC: CPT | Performed by: EMERGENCY MEDICINE

## 2019-12-04 RX ORDER — FAMOTIDINE 20 MG/1
20 TABLET, FILM COATED ORAL 2 TIMES DAILY
Qty: 10 TABLET | Refills: 0 | Status: SHIPPED | OUTPATIENT
Start: 2019-12-04 | End: 2020-03-13

## 2019-12-04 RX ORDER — DIPHENHYDRAMINE HYDROCHLORIDE 50 MG/ML
25 INJECTION INTRAMUSCULAR; INTRAVENOUS ONCE
Status: COMPLETED | OUTPATIENT
Start: 2019-12-04 | End: 2019-12-04

## 2019-12-04 RX ORDER — EPINEPHRINE 0.3 MG/.3ML
INJECTION SUBCUTANEOUS
Qty: 1 EACH | Refills: 0 | Status: ON HOLD | OUTPATIENT
Start: 2019-12-04 | End: 2020-06-04

## 2019-12-04 RX ORDER — FAMOTIDINE 10 MG/ML
20 INJECTION, SOLUTION INTRAVENOUS ONCE
Status: COMPLETED | OUTPATIENT
Start: 2019-12-04 | End: 2019-12-04

## 2019-12-04 RX ORDER — SODIUM CHLORIDE 0.9 % (FLUSH) 0.9 %
10 SYRINGE (ML) INJECTION AS NEEDED
Status: DISCONTINUED | OUTPATIENT
Start: 2019-12-04 | End: 2019-12-04 | Stop reason: HOSPADM

## 2019-12-04 RX ADMIN — DIPHENHYDRAMINE HYDROCHLORIDE 25 MG: 50 INJECTION, SOLUTION INTRAMUSCULAR; INTRAVENOUS at 13:32

## 2019-12-04 RX ADMIN — SODIUM CHLORIDE, PRESERVATIVE FREE 10 ML: 5 INJECTION INTRAVENOUS at 13:34

## 2019-12-04 RX ADMIN — FAMOTIDINE 20 MG: 10 INJECTION INTRAVENOUS at 13:32

## 2019-12-04 NOTE — DISCHARGE INSTRUCTIONS
Take Benadryl regularly.  Take Pepcid as prescribed.  Return to the emergency department for worsening swelling, difficulty swallowing, shortness of breath, or other concern.

## 2019-12-04 NOTE — TELEPHONE ENCOUNTER
Pt called stating she was in the ER today for dehydration n/v was given promethazine for nausea after she took it her lips swelled up like a duck and have some trouble breathing I told her to go back to the ER immediately she may need an epi pen. All per Dr Laura. KENISHA

## 2019-12-04 NOTE — ED NOTES
"Pt c/o allergic reaction since this after noon. Pt states she \"has hives and an allergy to cold weather.\" pt states she noticed the swelling after walking outside this afternoon with only mild localized swelling to the upper lip noted at this time. Pt presents with a clear airway, no coughing, no trouble swallowing,  and no respiratory distress at this time.Vitals stable and 98% O2 on room air at rest. .          Sameer Goodman RN  12/04/19 0362    "

## 2019-12-04 NOTE — ED NOTES
Pt ambulatory c steady gait, AAOx4, ABC's intact, NAD noted at this time. Pt discharged c belongings and educational packet.         Sameer Goodman, RN  12/04/19 2531

## 2019-12-04 NOTE — ED PROVIDER NOTES
EMERGENCY DEPARTMENT ENCOUNTER    CHIEF COMPLAINT  Chief Complaint: upper lip swelling  History given by: patient  History limited by: nothing  Room Number: 11/11  PMD: Morro Laura MD      HPI:  Pt is a 23 y.o. female who presents complaining of upper lip swelling that she noticed earlier this morning after she was outside in the cold. The patient also complains of associated light-headedness but denies associated rash or trouble swallowing. The patient reports she has a hx of allergic reactions in the past. The patient reports she has developed lip swelling and an urticaria rash in the past after being outside in the cold. The patient reports she took one pill of Benadryl around 1145 this afternoon with mild improvement of her symptoms. The patient denies any recent new medications, new soap/lotion/detergent, or new clothing. The patient reports she is currently 14 weeks pregnant and is followed by Dr. Thomas. There are no other complaints at this time. Patient's family at bedside.    Duration: began earlier this morning  Onset: gradual  Timing: constant  Location: upper lip  Radiation: none specified  Quality: swelling  Intensity/Severity: moderate  Progression: not specified  Associated Symptoms: light-headedness  Aggravating Factors: being outside in the cold  Alleviating Factors: none specified  Previous Episodes: The patient reports she has a hx of allergic reactions in the past. The patient reports she has developed lip swelling and an urticaria rash in the past after being outside in the cold.   Treatment before arrival: The patient reports she took one pill of Benadryl around 1145 this afternoon with mild improvement of her symptoms.    PAST MEDICAL HISTORY  Active Ambulatory Problems     Diagnosis Date Noted   • Genital herpes affecting pregnancy, antepartum 10/22/2019     Resolved Ambulatory Problems     Diagnosis Date Noted   • No Resolved Ambulatory Problems     Past Medical History:   Diagnosis  Date   • Eczema of both hands        PAST SURGICAL HISTORY  History reviewed. No pertinent surgical history.    FAMILY HISTORY  Family History   Problem Relation Age of Onset   • Cancer Mother    • Hypertension Mother    • Hypertension Father    • No Known Problems Sister    • Birth defects Paternal Grandfather    • No Known Problems Maternal Grandmother    • No Known Problems Maternal Grandfather    • No Known Problems Paternal Grandmother        SOCIAL HISTORY  Social History     Socioeconomic History   • Marital status: Single     Spouse name: Not on file   • Number of children: 0   • Years of education: Not on file   • Highest education level: Not on file   Tobacco Use   • Smoking status: Former Smoker   • Smokeless tobacco: Never Used   • Tobacco comment: occasional use    Substance and Sexual Activity   • Alcohol use: No     Frequency: Never     Comment: occasional   • Drug use: No   • Sexual activity: Defer     Partners: Male     Birth control/protection: None       ALLERGIES  Nuts; Amoxil [amoxicillin]; Flagyl [metronidazole]; and Penicillins    REVIEW OF SYSTEMS  Review of Systems   Constitutional: Negative for fever.   HENT: Positive for facial swelling (upper lip). Negative for sore throat and trouble swallowing.    Eyes: Negative.    Respiratory: Negative for cough and shortness of breath.    Cardiovascular: Negative for chest pain.   Gastrointestinal: Negative for abdominal pain, diarrhea and vomiting.   Genitourinary: Negative for dysuria.   Musculoskeletal: Negative for neck pain.   Skin: Negative for rash.   Allergic/Immunologic: Negative.    Neurological: Positive for light-headedness. Negative for weakness, numbness and headaches.   Hematological: Negative.    Psychiatric/Behavioral: Negative.    All other systems reviewed and are negative.      PHYSICAL EXAM  ED Triage Vitals   Temp Heart Rate Resp BP SpO2   12/04/19 1206 12/04/19 1206 12/04/19 1206 12/04/19 1300 12/04/19 1206   97.8 °F (36.6 °C)  112 18 103/68 100 %      Temp src Heart Rate Source Patient Position BP Location FiO2 (%)   -- -- 12/04/19 1300 12/04/19 1300 --     Lying Left arm        Physical Exam   Constitutional: She is oriented to person, place, and time. No distress.   HENT:   Head: Normocephalic.   There is mild swelling of the upper lip. She is swallowing her own secretions without difficulty.   Eyes: EOM are normal. Pupils are equal, round, and reactive to light.   Neck: Normal range of motion. Neck supple.   Cardiovascular: Normal rate, regular rhythm, normal heart sounds and intact distal pulses. Exam reveals no gallop and no friction rub.   No murmur heard.  Pulmonary/Chest: Effort normal and breath sounds normal. No respiratory distress. She has no decreased breath sounds. She has no wheezes. She has no rhonchi. She has no rales. She exhibits no tenderness.   Abdominal: Soft. Bowel sounds are normal. She exhibits no distension and no mass. There is no tenderness. There is no rebound and no guarding.   Musculoskeletal: Normal range of motion. She exhibits no edema.   Neurological: She is alert and oriented to person, place, and time. She has normal sensation and normal strength.   Skin: Skin is warm and dry. No rash noted.   Psychiatric: Mood and affect normal.   Nursing note and vitals reviewed.      LAB RESULTS  Lab Results (last 24 hours)     Procedure Component Value Units Date/Time    CBC & Differential [[id number]] Collected:  12/04/19 1316    Specimen:  Blood Updated:  12/04/19 1345    Narrative:       The following orders were created for panel order CBC & Differential.  Procedure                               Abnormality         Status                     ---------                               -----------         ------                     CBC Auto Differential[[id number]]        Abnormal            Final result                 Please view results for these tests on the individual orders.    Basic Metabolic Panel [[id number]]   (Normal) Collected:  12/04/19 1316    Specimen:  Blood Updated:  12/04/19 1358     Glucose 80 mg/dL      BUN 7 mg/dL      Creatinine 0.62 mg/dL      Sodium 138 mmol/L      Potassium 3.7 mmol/L      Chloride 101 mmol/L      CO2 24.8 mmol/L      Calcium 9.0 mg/dL      eGFR  African Amer 146 mL/min/1.73      BUN/Creatinine Ratio 11.3     Anion Gap 12.2 mmol/L     Narrative:       GFR Normal >60  Chronic Kidney Disease <60  Kidney Failure <15    CBC Auto Differential [[id number]]  (Abnormal) Collected:  12/04/19 1316    Specimen:  Blood Updated:  12/04/19 1345     WBC 7.77 10*3/mm3      RBC 3.62 10*6/mm3      Hemoglobin 11.5 g/dL      Hematocrit 33.7 %      MCV 93.1 fL      MCH 31.8 pg      MCHC 34.1 g/dL      RDW 12.2 %      RDW-SD 41.9 fl      MPV 10.7 fL      Platelets 199 10*3/mm3      Neutrophil % 65.0 %      Lymphocyte % 25.2 %      Monocyte % 7.7 %      Eosinophil % 1.2 %      Basophil % 0.3 %      Immature Grans % 0.6 %      Neutrophils, Absolute 5.05 10*3/mm3      Lymphocytes, Absolute 1.96 10*3/mm3      Monocytes, Absolute 0.60 10*3/mm3      Eosinophils, Absolute 0.09 10*3/mm3      Basophils, Absolute 0.02 10*3/mm3      Immature Grans, Absolute 0.05 10*3/mm3      nRBC 0.0 /100 WBC           I ordered the above labs and reviewed the results    RADIOLOGY  No orders to display        I ordered the above noted radiological studies. Interpreted by radiologist. Reviewed by me in PACS.       PROCEDURES  Procedures      PROGRESS AND CONSULTS  ED Course as of Dec 04 1509   Wed Dec 04, 2019   1312 Old records reviewed.  Patient was seen here in the ER on 11/26/2019 for nausea and vomiting during pregnancy.  She was discharged with a prescription for Phenergan.  [WH]   1448 Patient is resting and comfortably.  Her upper lip is still somewhat swollen but has not gotten worse.  She is swallowing her own secretions without difficulty.  Patient was advised to continue taking Benadryl.  She will be discharged with a  prescription for Pepcid.  Return precautions were discussed with the patient.  She also requested a prescription for a new EpiPen as hers has .  I told the patient that I doubt her symptoms were caused by Phenergan as she has been taking that over the past week or so without any difficulty.  [WH]      ED Course User Index  [WH] Mario Jacinto MD     2124 Ordered Benadryl and Pepcid to treat patient. Ordered blood work for further evaluation.      MEDICAL DECISION MAKING  Results were reviewed/discussed with the patient and they were also made aware of online access. Pt also made aware that some labs, such as cultures, will not be resulted during ER visit and follow up with PMD is necessary.     MDM  Number of Diagnoses or Management Options  Angioedema of lips, initial encounter:   Diagnosis management comments: Patient presented to the ER with swelling of her upper lip.  She started taking Phenergan approximately 1 week ago but had not had any problems until today.  Her symptoms developed after she was outside in the cold.  She states this has happened previously due to cold exposure.  Patient was breathing comfortably and able to swallow her own secretions without difficulty while in the ER.  She was given IV Benadryl and Pepcid.  Her symptoms remain stable.  She will be discharged with prescription for Pepcid.  She was also given a prescription for a new EpiPen.  Patient is currently approximately 14 weeks pregnant.       Amount and/or Complexity of Data Reviewed  Clinical lab tests: ordered and reviewed  Decide to obtain previous medical records or to obtain history from someone other than the patient: yes  Review and summarize past medical records: yes (See ED Course for medical record review.)    Patient Progress  Patient progress: stable         DIAGNOSIS  Final diagnoses:   Angioedema of lips, initial encounter  14 weeks pregnant       DISPOSITION  Discharge    Latest Documented Vital Signs:  As  of 3:09 PM  BP- 104/69 HR- 112 Temp- 97.8 °F (36.6 °C) O2 sat- 100%    --  Documentation assistance provided by michelle Mcintosh for Dr. Orville MD.  Information recorded by the donnaibmehrdad was done at my direction and has been verified and validated by me.     Trisha Mcintosh  12/04/19 4433       Mario Jacinto MD  12/04/19 0600

## 2020-02-24 ENCOUNTER — TELEPHONE (OUTPATIENT)
Dept: SPORTS MEDICINE | Facility: CLINIC | Age: 25
End: 2020-02-24

## 2020-02-27 NOTE — TELEPHONE ENCOUNTER
She does not need a referral, we can give her offices in network and she can call and schedule.  It looks like she has not been seen the doctor we scheduled her to see previously.  She needs to maintain these visits if she is still pregnant.

## 2020-03-10 LAB — GLUCOSE 2H P 100 G GLC PO SERPL-MCNC: 86 MG/DL

## 2020-03-13 ENCOUNTER — PROCEDURE VISIT (OUTPATIENT)
Dept: OBSTETRICS AND GYNECOLOGY | Facility: CLINIC | Age: 25
End: 2020-03-13

## 2020-03-13 ENCOUNTER — INITIAL PRENATAL (OUTPATIENT)
Dept: OBSTETRICS AND GYNECOLOGY | Facility: CLINIC | Age: 25
End: 2020-03-13

## 2020-03-13 VITALS — DIASTOLIC BLOOD PRESSURE: 70 MMHG | WEIGHT: 166 LBS | BODY MASS INDEX: 26.79 KG/M2 | SYSTOLIC BLOOD PRESSURE: 116 MMHG

## 2020-03-13 DIAGNOSIS — Z36.89 ENCOUNTER FOR ULTRASOUND TO CHECK FETAL GROWTH: Primary | ICD-10-CM

## 2020-03-13 DIAGNOSIS — O98.319 GENITAL HERPES AFFECTING PREGNANCY, ANTEPARTUM: ICD-10-CM

## 2020-03-13 DIAGNOSIS — Z36.9 UNSPECIFIED ANTENATAL SCREENING: ICD-10-CM

## 2020-03-13 DIAGNOSIS — Z34.93 PRENATAL CARE IN THIRD TRIMESTER: Primary | ICD-10-CM

## 2020-03-13 DIAGNOSIS — O99.019 MATERNAL ANEMIA IN PREGNANCY, ANTEPARTUM: ICD-10-CM

## 2020-03-13 DIAGNOSIS — A60.09 GENITAL HERPES AFFECTING PREGNANCY, ANTEPARTUM: ICD-10-CM

## 2020-03-13 LAB
GLUCOSE UR STRIP-MCNC: NEGATIVE MG/DL
PROT UR STRIP-MCNC: ABNORMAL MG/DL

## 2020-03-13 PROCEDURE — 0502F SUBSEQUENT PRENATAL CARE: CPT | Performed by: NURSE PRACTITIONER

## 2020-03-13 PROCEDURE — 76816 OB US FOLLOW-UP PER FETUS: CPT | Performed by: NURSE PRACTITIONER

## 2020-03-13 NOTE — PROGRESS NOTES
OB follow up > 20 weeks  CC: OB visit     Mary Sun is a 24 y.o.  28w5d being seen today for her obstetrical visit.  She is a transfer of care from [facility].  She reports she transferred care because they no longer take her insurance.  This is her first pregnancy.  She has an established EDC of May 31 based off of her last menstrual period and a 7-[facility].  She is supposed to be taking prenatal vitamins and iron but states she does a poor job at being compliant.  She has a prescription for Valtrex due to a history of HSV but was told only to take it if she has an outbreak and she will be required to take it at the end of pregnancy.  She completed her 2-hour glucose test earlier this week at [facility].  She reports she had a flu and a Tdap vaccine.  She states she wanted to have Down syndrome screen and CF testing but could not afford so she did not complete.  She reports overall she feels well.  She states that sometimes drinking milk does not agree with her stomach.  Taking prenatal vitamins: Yes       Review of Systems  Constitutional: neg fatigue  Cardiovascular: neg edeam  Gastrointestinal: Occas nausea, neg vomiting  Genitourinary: Negative for contractions, cramping, vaginal bleeding, or SROM.   Fetal movement: normal    /70   Wt 75.3 kg (166 lb)   LMP 2019   BMI 26.79 kg/m²     FHT: present BPM   Uterine Size: Growth 59%        Assessment    1) pregnancy at 28w5d- She is a transfer of care from [facility]. US IMP: EFW 59.1 % MICHELLE 18.74 CM.    2) HSV 2- Denies any outbreaks during her pregnancy.  She understands she will need to start daily prophylaxis at around 35 to 36 weeks of her pregnancy.    3) Labs- All of her labs were rev'd.  She is Rh neg, reports she had rhogam on Tuesday @ [facility].  She is considering cystic fibrosis and nips testing.  She states she declined earlier in pregnancy because she could not afford.  She desires the  testing but still cannot afford.  She is going to check with her insurance to see if they cover.  She has not had a hemoglobin electrophoresis either which is recommended.  [facility], need to request those results.  In review of her chart and [facility] lab was unable to be located.    4) S/P tdap and flu vaccine    5) Rh neg- S/P rhogam, need records from [facility].     6) Travel- She will be traveling to [place] next week for her baby shower.  A copy of her prenatal labs and last ultrasound was provided to patient for travel.  Encourage patient to ambulate every 2 hours during her drive.  Review warning signs and symptoms of  labor.    7) Anemia- Stressed importance of taking iron daily if not BID.     Plan  New OB bag given to patient. Oriented to practice.   Continue prenatal vitamins and iron  Reviewed this stage of pregnancy  Problem list updated   Follow up in 2 weeks    Kayla Martin, PB  3/13/2020  12:48

## 2020-03-14 LAB
BACTERIA UR CULT: NORMAL
BACTERIA UR CULT: NORMAL

## 2020-03-31 ENCOUNTER — ROUTINE PRENATAL (OUTPATIENT)
Dept: OBSTETRICS AND GYNECOLOGY | Facility: CLINIC | Age: 25
End: 2020-03-31

## 2020-03-31 VITALS — BODY MASS INDEX: 27.92 KG/M2 | DIASTOLIC BLOOD PRESSURE: 68 MMHG | WEIGHT: 173 LBS | SYSTOLIC BLOOD PRESSURE: 118 MMHG

## 2020-03-31 DIAGNOSIS — O98.319 GENITAL HERPES AFFECTING PREGNANCY, ANTEPARTUM: ICD-10-CM

## 2020-03-31 DIAGNOSIS — Z36.9 ENCOUNTER FOR ANTENATAL SCREENING, UNSPECIFIED: Primary | ICD-10-CM

## 2020-03-31 DIAGNOSIS — A60.09 GENITAL HERPES AFFECTING PREGNANCY, ANTEPARTUM: ICD-10-CM

## 2020-03-31 DIAGNOSIS — Z67.91 RH NEGATIVE, ANTEPARTUM: ICD-10-CM

## 2020-03-31 DIAGNOSIS — Z3A.31 31 WEEKS GESTATION OF PREGNANCY: ICD-10-CM

## 2020-03-31 DIAGNOSIS — O26.899 RH NEGATIVE, ANTEPARTUM: ICD-10-CM

## 2020-03-31 PROBLEM — L30.9 ECZEMA: Status: ACTIVE | Noted: 2020-03-31

## 2020-03-31 LAB
GLUCOSE UR STRIP-MCNC: NEGATIVE MG/DL
PROT UR STRIP-MCNC: ABNORMAL MG/DL

## 2020-03-31 PROCEDURE — 0502F SUBSEQUENT PRENATAL CARE: CPT | Performed by: OBSTETRICS & GYNECOLOGY

## 2020-03-31 RX ORDER — VALACYCLOVIR HYDROCHLORIDE 500 MG/1
500 TABLET, FILM COATED ORAL 2 TIMES DAILY
Qty: 60 TABLET | Refills: 2 | Status: SHIPPED | OUTPATIENT
Start: 2020-03-31 | End: 2020-04-27 | Stop reason: SDUPTHER

## 2020-03-31 NOTE — PROGRESS NOTES
OB follow up     Chief Complaint: [facility] RADHA Sun is a 24 y.o.  31w2d being seen today for her obstetrical visit. This is the first time I am seeing this pt in this pregnancy. Patient reports she thinks she is having an HSV outbreak. She started Valtrex 500mg po bid last week and symptoms are resolving. Pt also complaining of eczema on her hands and wants other options than steroids. Fetal movement: normal. Pt is a transfer from [facility]. She reports she had her 1hr GTT there- I do not see results in prenatal records. Pt also reports she had her Rhogam on that day as well.      Review of Systems  No bleeding, No cramping/contractions     /68   Wt 78.5 kg (173 lb)   LMP 2019   BMI 27.92 kg/m²     FHT: present   Uterine Size: 32cm     : No ulcers are abnormal areas seen of vagina or perineum.    Assessment/Plan: Low risk pregnancy    1) pregnancy at 31w2d: records from [facility] reviewed. No GTT and H/H result. Will request today!     2) HSV 2: had an outbreak last week. No residual lesions seen on PE today. Start suppression now with Valtrex 500mg po bid.    3) Rh negative: s/p Rhogam at 28 weeks    4) Eczema: Rx Eucrisa Ointment    5) s/p flu and tDap vaccines    6) +UDS: THC on prior UDS. Check UDS today.    Reviewed this stage of pregnancy  Problem list updated   No follow-ups on file.      Dia Sousa DO    3/31/2020  10:42

## 2020-04-01 PROBLEM — F12.10 MILD TETRAHYDROCANNABINOL (THC) ABUSE: Status: ACTIVE | Noted: 2020-04-01

## 2020-04-01 LAB
AMPHETAMINES UR QL SCN: NEGATIVE NG/ML
BARBITURATES UR QL SCN: NEGATIVE NG/ML
BENZODIAZ UR QL SCN: NEGATIVE NG/ML
BZE UR QL SCN: NEGATIVE NG/ML
CANNABINOIDS UR QL SCN: POSITIVE NG/ML
CREAT UR-MCNC: 216.8 MG/DL (ref 20–300)
LABORATORY COMMENT REPORT: ABNORMAL
METHADONE UR QL SCN: NEGATIVE NG/ML
OPIATES UR QL SCN: NEGATIVE NG/ML
OXYCODONE+OXYMORPHONE UR QL SCN: NEGATIVE NG/ML
PCP UR QL: NEGATIVE NG/ML
PH UR: 6.3 [PH] (ref 4.5–8.9)
PROPOXYPH UR QL SCN: NEGATIVE NG/ML

## 2020-04-13 ENCOUNTER — ROUTINE PRENATAL (OUTPATIENT)
Dept: OBSTETRICS AND GYNECOLOGY | Facility: CLINIC | Age: 25
End: 2020-04-13

## 2020-04-13 VITALS — BODY MASS INDEX: 29.05 KG/M2 | SYSTOLIC BLOOD PRESSURE: 124 MMHG | WEIGHT: 180 LBS | DIASTOLIC BLOOD PRESSURE: 74 MMHG

## 2020-04-13 DIAGNOSIS — O98.319 GENITAL HERPES AFFECTING PREGNANCY, ANTEPARTUM: ICD-10-CM

## 2020-04-13 DIAGNOSIS — Z34.93 NORMAL PREGNANCY IN THIRD TRIMESTER: Primary | ICD-10-CM

## 2020-04-13 DIAGNOSIS — A60.09 GENITAL HERPES AFFECTING PREGNANCY, ANTEPARTUM: ICD-10-CM

## 2020-04-13 DIAGNOSIS — O26.899 RH NEGATIVE, ANTEPARTUM: ICD-10-CM

## 2020-04-13 DIAGNOSIS — Z67.91 RH NEGATIVE, ANTEPARTUM: ICD-10-CM

## 2020-04-13 LAB
GLUCOSE UR STRIP-MCNC: NEGATIVE MG/DL
PROT UR STRIP-MCNC: NEGATIVE MG/DL

## 2020-04-13 PROCEDURE — 0502F SUBSEQUENT PRENATAL CARE: CPT | Performed by: OBSTETRICS & GYNECOLOGY

## 2020-04-13 NOTE — PROGRESS NOTES
OB follow up     Mary Sun is a 24 y.o.  33w1d being seen today for her obstetrical visit.  Patient reports no bleeding, no contractions and no leaking. Fetal movement: normal.  Prenatal care complicated by HSV carrier.  She is currently on prophylaxis Valtrex and has no current symptoms.    Review of Systems  No bleeding, No cramping/contractions     /74   Wt 81.6 kg (180 lb)   LMP 2019   BMI 29.05 kg/m²     FHT: present BPM   Uterine Size: 33 cm   [facility] was normal.  She did receive her RhoGam injection.    Assessment/Plan:    1) 24 y.o.  -pregnancy at 33w1d    2)   Encounter Diagnoses   Name Primary?   • Normal pregnancy in third trimester Yes   • Genital herpes affecting pregnancy, antepartum    • Rh negative, antepartum        3) Reviewed this stage of pregnancy  4) Problem list updated     Return in about 2 weeks (around 2020) for OB Tummy.      Vern Jacinto MD    2020  10:24

## 2020-04-27 ENCOUNTER — ROUTINE PRENATAL (OUTPATIENT)
Dept: OBSTETRICS AND GYNECOLOGY | Facility: CLINIC | Age: 25
End: 2020-04-27

## 2020-04-27 VITALS — WEIGHT: 188 LBS | SYSTOLIC BLOOD PRESSURE: 124 MMHG | DIASTOLIC BLOOD PRESSURE: 70 MMHG | BODY MASS INDEX: 30.34 KG/M2

## 2020-04-27 DIAGNOSIS — O98.319 GENITAL HERPES AFFECTING PREGNANCY, ANTEPARTUM: ICD-10-CM

## 2020-04-27 DIAGNOSIS — O26.899 RH NEGATIVE, ANTEPARTUM: ICD-10-CM

## 2020-04-27 DIAGNOSIS — A60.09 GENITAL HERPES AFFECTING PREGNANCY, ANTEPARTUM: ICD-10-CM

## 2020-04-27 DIAGNOSIS — Z67.91 RH NEGATIVE, ANTEPARTUM: ICD-10-CM

## 2020-04-27 DIAGNOSIS — N89.8 VAGINAL ITCHING: ICD-10-CM

## 2020-04-27 DIAGNOSIS — Z34.93 PRENATAL CARE IN THIRD TRIMESTER: Primary | ICD-10-CM

## 2020-04-27 LAB
GLUCOSE UR STRIP-MCNC: NEGATIVE MG/DL
PROT UR STRIP-MCNC: NEGATIVE MG/DL

## 2020-04-27 PROCEDURE — 0502F SUBSEQUENT PRENATAL CARE: CPT | Performed by: NURSE PRACTITIONER

## 2020-04-27 RX ORDER — VALACYCLOVIR HYDROCHLORIDE 500 MG/1
500 TABLET, FILM COATED ORAL 2 TIMES DAILY
Qty: 60 TABLET | Refills: 2 | Status: SHIPPED | OUTPATIENT
Start: 2020-04-27 | End: 2020-05-27

## 2020-04-27 NOTE — PROGRESS NOTES
Ob follow up    Mary Sun is a 24 y.o.  35w1d patient being seen today for her obstetrical visit. Patient reports vaginal itching. She denies change in sex partner. She denies change in discharge. She is asking for help in obtaining a breast pump.  Fetal movement: normal.      ROS - Denies leaking fluid, vaginal bleeding and notes good fetal movement. + vaginal itching     /70   Wt 85.3 kg (188 lb)   LMP 2019   BMI 30.34 kg/m²     FHT:  presentBPM    Uterine Size: size less than dates   Presentations: cephalic   Pelvic Exam:     Dilation: def    Effacement:     Station:                   Assessment    1) Pregnancy at 35w1d  2) GBS status - collect at next week's appt  3) Contraception -Considering DMPA   4) Feeding plans - Breast  5) Labor plan - Considering epidural  6) Pediatrician- Undecided.   7) [facility] Scheduled- NA d/t COVID   8)HSV 2: Is taking Valtrex 500mg po bid currently   9) Rh negative: s/p Rhogam at 28 weeks  10) Eczema: Rx Eucrisa Ointment  11) s/p flu and tDap vaccines  12) +UDS: THC on prior UDS. Check UDS today.  13) S<D- Check growth US.   14) Vaginal itching- Check NuSwab.   15) Needs Hgb checked     Labor precautions and fetal kick counts discussed.  RTO 1 wk for OB internal, GBS, lab for HGB and growth US     Kayla Martin, APRN  2020  10:56

## 2020-04-30 LAB — DRUGS UR: NORMAL

## 2020-05-02 LAB
A VAGINAE DNA VAG QL NAA+PROBE: NORMAL SCORE
BVAB2 DNA VAG QL NAA+PROBE: NORMAL SCORE
C ALBICANS DNA VAG QL NAA+PROBE: NEGATIVE
C GLABRATA DNA VAG QL NAA+PROBE: NEGATIVE
C TRACH DNA VAG QL NAA+PROBE: NEGATIVE
M GENITALIUM DNA SPEC QL NAA+PROBE: NEGATIVE
M HOMINIS DNA SPEC QL NAA+PROBE: NEGATIVE
MEGA1 DNA VAG QL NAA+PROBE: NORMAL SCORE
N GONORRHOEA DNA VAG QL NAA+PROBE: NEGATIVE
T VAGINALIS DNA VAG QL NAA+PROBE: NEGATIVE
UREAPLASMA DNA SPEC QL NAA+PROBE: POSITIVE

## 2020-05-04 ENCOUNTER — ROUTINE PRENATAL (OUTPATIENT)
Dept: OBSTETRICS AND GYNECOLOGY | Facility: CLINIC | Age: 25
End: 2020-05-04

## 2020-05-04 ENCOUNTER — PROCEDURE VISIT (OUTPATIENT)
Dept: OBSTETRICS AND GYNECOLOGY | Facility: CLINIC | Age: 25
End: 2020-05-04

## 2020-05-04 VITALS — BODY MASS INDEX: 30.72 KG/M2 | DIASTOLIC BLOOD PRESSURE: 70 MMHG | SYSTOLIC BLOOD PRESSURE: 110 MMHG | WEIGHT: 190.3 LBS

## 2020-05-04 DIAGNOSIS — O98.319 GENITAL HERPES AFFECTING PREGNANCY, ANTEPARTUM: ICD-10-CM

## 2020-05-04 DIAGNOSIS — F12.10 MILD TETRAHYDROCANNABINOL (THC) ABUSE: ICD-10-CM

## 2020-05-04 DIAGNOSIS — Z36.9 ENCOUNTER FOR ANTENATAL SCREENING, UNSPECIFIED: Primary | ICD-10-CM

## 2020-05-04 DIAGNOSIS — Z67.91 RH NEGATIVE, ANTEPARTUM: ICD-10-CM

## 2020-05-04 DIAGNOSIS — Z34.90 PREGNANCY, UNSPECIFIED GESTATIONAL AGE: ICD-10-CM

## 2020-05-04 DIAGNOSIS — O26.899 RH NEGATIVE, ANTEPARTUM: ICD-10-CM

## 2020-05-04 DIAGNOSIS — O26.849 UTERINE SIZE DATE DISCREPANCY PREGNANCY, UNSPECIFIED TRIMESTER: Primary | ICD-10-CM

## 2020-05-04 DIAGNOSIS — D64.9 ANEMIA, UNSPECIFIED TYPE: ICD-10-CM

## 2020-05-04 DIAGNOSIS — A60.09 GENITAL HERPES AFFECTING PREGNANCY, ANTEPARTUM: ICD-10-CM

## 2020-05-04 LAB
BASOPHILS # BLD AUTO: 0.03 10*3/MM3 (ref 0–0.2)
BASOPHILS NFR BLD AUTO: 0.3 % (ref 0–1.5)
EOSINOPHIL # BLD AUTO: 0.16 10*3/MM3 (ref 0–0.4)
EOSINOPHIL NFR BLD AUTO: 1.8 % (ref 0.3–6.2)
ERYTHROCYTE [DISTWIDTH] IN BLOOD BY AUTOMATED COUNT: 12.5 % (ref 12.3–15.4)
GLUCOSE UR STRIP-MCNC: NEGATIVE MG/DL
HCT VFR BLD AUTO: 31.4 % (ref 34–46.6)
HEMOGLOBIN FRACTIONATION: NORMAL
HGB BLD-MCNC: 11 G/DL (ref 12–15.9)
IMM GRANULOCYTES # BLD AUTO: 0.11 10*3/MM3 (ref 0–0.05)
IMM GRANULOCYTES NFR BLD AUTO: 1.2 % (ref 0–0.5)
LYMPHOCYTES # BLD AUTO: 1.71 10*3/MM3 (ref 0.7–3.1)
LYMPHOCYTES NFR BLD AUTO: 19 % (ref 19.6–45.3)
MCH RBC QN AUTO: 32.6 PG (ref 26.6–33)
MCHC RBC AUTO-ENTMCNC: 35 G/DL (ref 31.5–35.7)
MCV RBC AUTO: 93.2 FL (ref 79–97)
MONOCYTES # BLD AUTO: 1.08 10*3/MM3 (ref 0.1–0.9)
MONOCYTES NFR BLD AUTO: 12 % (ref 5–12)
NEUTROPHILS # BLD AUTO: 5.9 10*3/MM3 (ref 1.7–7)
NEUTROPHILS NFR BLD AUTO: 65.7 % (ref 42.7–76)
NRBC BLD AUTO-RTO: 0 /100 WBC (ref 0–0.2)
PLATELET # BLD AUTO: 144 10*3/MM3 (ref 140–450)
PROT UR STRIP-MCNC: NEGATIVE MG/DL
RBC # BLD AUTO: 3.37 10*6/MM3 (ref 3.77–5.28)
WBC # BLD AUTO: 8.99 10*3/MM3 (ref 3.4–10.8)

## 2020-05-04 PROCEDURE — 76816 OB US FOLLOW-UP PER FETUS: CPT | Performed by: OBSTETRICS & GYNECOLOGY

## 2020-05-04 PROCEDURE — 0502F SUBSEQUENT PRENATAL CARE: CPT | Performed by: OBSTETRICS & GYNECOLOGY

## 2020-05-04 RX ORDER — AZITHROMYCIN 250 MG/1
TABLET, FILM COATED ORAL
Qty: 6 TABLET | Refills: 0 | Status: ON HOLD | OUTPATIENT
Start: 2020-05-04 | End: 2020-06-04

## 2020-05-04 RX ORDER — AZITHROMYCIN 250 MG/1
TABLET, FILM COATED ORAL
Qty: 2 TABLET | Refills: 0 | Status: SHIPPED | OUTPATIENT
Start: 2020-05-04 | End: 2020-05-04

## 2020-05-04 NOTE — PROGRESS NOTES
OB follow up     Mary Sun is a 24 y.o.  36w1d being seen today for her obstetrical visit.  Patient reports no complaints. Fetal movement: normal. This is the first time I am seeing her in this pregnancy and all of her problems are new to me. Records reviewed. No genetic screening found.   Review of Systems  No bleeding, No cramping/contractions     /70   Wt 86.3 kg (190 lb 4.8 oz)   LMP 2019   BMI 30.72 kg/m²     FHT: 138 BPM   Uterine Size: 35  cm       Assessment/Plan:    1) 24 y.o.  -pregnancy at 36w1d- GBS collected today. PCN allergy, need sensitivities.   3) Contraception -Considering DMPA   4) Feeding plans - Breast  5) Labor plan - Considering epidural, will decide in labor  6) Pediatrician- [facility]   7) [facility] Scheduled- NA d/t COVID   8)HSV 2: Is taking Valtrex 500mg po bid currently   9) Rh negative: s/p Rhogam at 28 weeks  10) Eczema: on Eucrisa Ointment  11) s/p flu and tDap vaccines  12) +UDS: THC on prior UDS. Check UDS today.  13) S<D- US growth today shows growth 40% (6.2#) , MICHELLE 15.4, good interval growth since US on 3/13/2020.   14) Vaginal itching- + swab for ureaplasma. Rx for Zithromax called in.    15) Anemia- on daily iron, check CBC. Normal HgB electrophoresis  16)Reviewed this stage of pregnancy  17)Problem list updated   18) RTO 1 week OBT      Jesi Lopez MD    2020  10:23

## 2020-05-05 LAB
AMPHETAMINES UR QL SCN: NEGATIVE NG/ML
BARBITURATES UR QL SCN: NEGATIVE NG/ML
BENZODIAZ UR QL SCN: NEGATIVE NG/ML
BZE UR QL SCN: NEGATIVE NG/ML
CANNABINOIDS UR QL SCN: NEGATIVE NG/ML
CREAT UR-MCNC: 101.8 MG/DL (ref 20–300)
LABORATORY COMMENT REPORT: NORMAL
METHADONE UR QL SCN: NEGATIVE NG/ML
OPIATES UR QL SCN: NEGATIVE NG/ML
OXYCODONE+OXYMORPHONE UR QL SCN: NEGATIVE NG/ML
PCP UR QL: NEGATIVE NG/ML
PH UR: 6.5 [PH] (ref 4.5–8.9)
PROPOXYPH UR QL SCN: NEGATIVE NG/ML

## 2020-05-05 RX ORDER — DOXYCYCLINE HYCLATE 50 MG/1
324 CAPSULE, GELATIN COATED ORAL 2 TIMES DAILY
Qty: 60 TABLET | Refills: 6 | Status: SHIPPED | OUTPATIENT
Start: 2020-05-05 | End: 2020-06-07 | Stop reason: HOSPADM

## 2020-05-08 LAB
B-HEM STREP SPEC QL CULT: POSITIVE
CLINDAMYCIN ISLT KB: ABNORMAL
ORGANISM ID: ABNORMAL

## 2020-05-11 ENCOUNTER — ROUTINE PRENATAL (OUTPATIENT)
Dept: OBSTETRICS AND GYNECOLOGY | Facility: CLINIC | Age: 25
End: 2020-05-11

## 2020-05-11 VITALS — WEIGHT: 196.6 LBS | DIASTOLIC BLOOD PRESSURE: 80 MMHG | BODY MASS INDEX: 31.73 KG/M2 | SYSTOLIC BLOOD PRESSURE: 120 MMHG

## 2020-05-11 DIAGNOSIS — O26.899 RH NEGATIVE, ANTEPARTUM: ICD-10-CM

## 2020-05-11 DIAGNOSIS — O98.319 GENITAL HERPES AFFECTING PREGNANCY, ANTEPARTUM: ICD-10-CM

## 2020-05-11 DIAGNOSIS — A60.09 GENITAL HERPES AFFECTING PREGNANCY, ANTEPARTUM: ICD-10-CM

## 2020-05-11 DIAGNOSIS — Z67.91 RH NEGATIVE, ANTEPARTUM: ICD-10-CM

## 2020-05-11 DIAGNOSIS — B95.1 POSITIVE GBS TEST: ICD-10-CM

## 2020-05-11 DIAGNOSIS — Z3A.37 37 WEEKS GESTATION OF PREGNANCY: Primary | ICD-10-CM

## 2020-05-11 LAB
GLUCOSE UR STRIP-MCNC: NEGATIVE MG/DL
PROT UR STRIP-MCNC: NEGATIVE MG/DL

## 2020-05-11 PROCEDURE — 0502F SUBSEQUENT PRENATAL CARE: CPT | Performed by: OBSTETRICS & GYNECOLOGY

## 2020-05-11 NOTE — PROGRESS NOTES
OB follow up     Mary Sun is a 24 y.o.  37w1d being seen today for her obstetrical visit.  Patient reports no bleeding, no contractions and no leaking. Fetal movement: normal.  Prenatal care complicated by history of HSV.  She takes Valtrex for suppression and no current outbreaks.  In addition she is GBS positive.  Plan antibiotics in labor.  She is Rh- and did receive RhoGam.    Review of Systems  No bleeding, No cramping/contractions     /80   Wt 89.2 kg (196 lb 9.6 oz)   LMP 2019   BMI 31.73 kg/m²     FHT: present BPM   Uterine Size: 37 cm       Assessment/Plan:    1) 24 y.o.  -pregnancy at 37w1d    2)   Encounter Diagnoses   Name Primary?   • 37 weeks gestation of pregnancy Yes   • Positive GBS test- PNC allergic, clinda resistant, needs Vanc in labor    • Rh negative, antepartum    • Genital herpes affecting pregnancy, antepartum    Continue daily Valtrex suppression.  Labor warnings reviewed.    3) Reviewed this stage of pregnancy  4) Problem list updated     Return in about 1 week (around 2020) for OB INT.      Venr Jacinto MD    2020  13:43

## 2020-05-20 ENCOUNTER — ROUTINE PRENATAL (OUTPATIENT)
Dept: OBSTETRICS AND GYNECOLOGY | Facility: CLINIC | Age: 25
End: 2020-05-20

## 2020-05-20 VITALS — BODY MASS INDEX: 31.75 KG/M2 | WEIGHT: 196.7 LBS | SYSTOLIC BLOOD PRESSURE: 120 MMHG | DIASTOLIC BLOOD PRESSURE: 80 MMHG

## 2020-05-20 DIAGNOSIS — A60.09 GENITAL HERPES AFFECTING PREGNANCY, ANTEPARTUM: Primary | ICD-10-CM

## 2020-05-20 DIAGNOSIS — D50.8 OTHER IRON DEFICIENCY ANEMIA: ICD-10-CM

## 2020-05-20 DIAGNOSIS — O26.899 RH NEGATIVE, ANTEPARTUM: ICD-10-CM

## 2020-05-20 DIAGNOSIS — O98.319 GENITAL HERPES AFFECTING PREGNANCY, ANTEPARTUM: Primary | ICD-10-CM

## 2020-05-20 DIAGNOSIS — F12.10 MILD TETRAHYDROCANNABINOL (THC) ABUSE: ICD-10-CM

## 2020-05-20 DIAGNOSIS — L30.8 OTHER ECZEMA: ICD-10-CM

## 2020-05-20 DIAGNOSIS — Z67.91 RH NEGATIVE, ANTEPARTUM: ICD-10-CM

## 2020-05-20 DIAGNOSIS — B95.1 POSITIVE GBS TEST: ICD-10-CM

## 2020-05-20 LAB
GLUCOSE UR STRIP-MCNC: NEGATIVE MG/DL
PROT UR STRIP-MCNC: NEGATIVE MG/DL

## 2020-05-20 PROCEDURE — 0502F SUBSEQUENT PRENATAL CARE: CPT | Performed by: OBSTETRICS & GYNECOLOGY

## 2020-05-20 NOTE — PROGRESS NOTES
Pt without complaints.  Declines cervical exam.  Labor warnings reviewed. Will discuss IOL next visit.

## 2020-05-26 ENCOUNTER — ROUTINE PRENATAL (OUTPATIENT)
Dept: OBSTETRICS AND GYNECOLOGY | Facility: CLINIC | Age: 25
End: 2020-05-26

## 2020-05-26 VITALS — WEIGHT: 199.9 LBS | SYSTOLIC BLOOD PRESSURE: 110 MMHG | DIASTOLIC BLOOD PRESSURE: 70 MMHG | BODY MASS INDEX: 32.26 KG/M2

## 2020-05-26 DIAGNOSIS — Z3A.39 39 WEEKS GESTATION OF PREGNANCY: ICD-10-CM

## 2020-05-26 DIAGNOSIS — D50.8 OTHER IRON DEFICIENCY ANEMIA: ICD-10-CM

## 2020-05-26 DIAGNOSIS — B95.1 POSITIVE GBS TEST: Primary | ICD-10-CM

## 2020-05-26 LAB
GLUCOSE UR STRIP-MCNC: NEGATIVE MG/DL
HCT VFR BLD AUTO: 29.7 % (ref 34–46.6)
HGB BLD-MCNC: 10.1 G/DL (ref 12–15.9)
PROT UR STRIP-MCNC: ABNORMAL MG/DL

## 2020-05-26 PROCEDURE — 0502F SUBSEQUENT PRENATAL CARE: CPT | Performed by: OBSTETRICS & GYNECOLOGY

## 2020-05-26 NOTE — PROGRESS NOTES
Pt complains of pressure.  Counseled.  Will proceed with ANT nxt visit for postdates and consider scheduling IOL.  Will check H/H today.

## 2020-06-02 ENCOUNTER — TRANSCRIBE ORDERS (OUTPATIENT)
Dept: ADMINISTRATIVE | Facility: HOSPITAL | Age: 25
End: 2020-06-02

## 2020-06-02 ENCOUNTER — ROUTINE PRENATAL (OUTPATIENT)
Dept: OBSTETRICS AND GYNECOLOGY | Facility: CLINIC | Age: 25
End: 2020-06-02

## 2020-06-02 ENCOUNTER — PROCEDURE VISIT (OUTPATIENT)
Dept: OBSTETRICS AND GYNECOLOGY | Facility: CLINIC | Age: 25
End: 2020-06-02

## 2020-06-02 ENCOUNTER — LAB (OUTPATIENT)
Dept: LAB | Facility: HOSPITAL | Age: 25
End: 2020-06-02

## 2020-06-02 VITALS — WEIGHT: 204.1 LBS | SYSTOLIC BLOOD PRESSURE: 120 MMHG | BODY MASS INDEX: 32.94 KG/M2 | DIASTOLIC BLOOD PRESSURE: 80 MMHG

## 2020-06-02 DIAGNOSIS — O48.0 POST-TERM PREGNANCY, 40-42 WEEKS OF GESTATION: Primary | ICD-10-CM

## 2020-06-02 DIAGNOSIS — O98.319 GENITAL HERPES AFFECTING PREGNANCY, ANTEPARTUM: Primary | ICD-10-CM

## 2020-06-02 DIAGNOSIS — Z01.818 OTHER SPECIFIED PRE-OPERATIVE EXAMINATION: Primary | ICD-10-CM

## 2020-06-02 DIAGNOSIS — A60.09 GENITAL HERPES AFFECTING PREGNANCY, ANTEPARTUM: Primary | ICD-10-CM

## 2020-06-02 DIAGNOSIS — F12.10 MILD TETRAHYDROCANNABINOL (THC) ABUSE: ICD-10-CM

## 2020-06-02 DIAGNOSIS — O26.899 RH NEGATIVE, ANTEPARTUM: ICD-10-CM

## 2020-06-02 DIAGNOSIS — Z67.91 RH NEGATIVE, ANTEPARTUM: ICD-10-CM

## 2020-06-02 DIAGNOSIS — B95.1 POSITIVE GBS TEST: ICD-10-CM

## 2020-06-02 DIAGNOSIS — Z01.818 OTHER SPECIFIED PRE-OPERATIVE EXAMINATION: ICD-10-CM

## 2020-06-02 LAB
GLUCOSE UR STRIP-MCNC: NEGATIVE MG/DL
PROT UR STRIP-MCNC: ABNORMAL MG/DL
SARS-COV-2 RNA RESP QL NAA+PROBE: NOT DETECTED

## 2020-06-02 PROCEDURE — 76816 OB US FOLLOW-UP PER FETUS: CPT | Performed by: OBSTETRICS & GYNECOLOGY

## 2020-06-02 PROCEDURE — 87635 SARS-COV-2 COVID-19 AMP PRB: CPT

## 2020-06-02 PROCEDURE — 0502F SUBSEQUENT PRENATAL CARE: CPT | Performed by: OBSTETRICS & GYNECOLOGY

## 2020-06-02 PROCEDURE — 76818 FETAL BIOPHYS PROFILE W/NST: CPT | Performed by: OBSTETRICS & GYNECOLOGY

## 2020-06-02 NOTE — PROGRESS NOTES
Pt states she is miserable.  Multiple discomfort complaints and swelling.     Pt here for exam and ANT for postdates.  U/s wnl: BPP 8/8. Total bpp - 10/10  MICHELLE 11cm  EFW 65%    Will scheduled for IOL in am for postdates.   R/b/a explained to pt who verbalized her understanding.

## 2020-06-03 ENCOUNTER — ANESTHESIA (OUTPATIENT)
Dept: OBSTETRICS AND GYNECOLOGY | Facility: HOSPITAL | Age: 25
End: 2020-06-03

## 2020-06-03 ENCOUNTER — HOSPITAL ENCOUNTER (INPATIENT)
Dept: LABOR AND DELIVERY | Facility: HOSPITAL | Age: 25
Discharge: HOME | End: 2020-06-03

## 2020-06-03 ENCOUNTER — HOSPITAL ENCOUNTER (INPATIENT)
Facility: HOSPITAL | Age: 25
LOS: 4 days | Discharge: HOME | End: 2020-06-07
Attending: OBSTETRICS & GYNECOLOGY | Admitting: OBSTETRICS & GYNECOLOGY

## 2020-06-03 ENCOUNTER — ANESTHESIA EVENT (OUTPATIENT)
Dept: OBSTETRICS AND GYNECOLOGY | Facility: HOSPITAL | Age: 25
End: 2020-06-03

## 2020-06-03 PROBLEM — Z34.90 ENCOUNTER FOR INDUCTION OF LABOR: Status: ACTIVE | Noted: 2020-06-03

## 2020-06-03 PROBLEM — Z37.9 NORMAL LABOR: Status: ACTIVE | Noted: 2020-06-03

## 2020-06-03 PROBLEM — Z34.90 PREGNANCY: Status: RESOLVED | Noted: 2020-05-04 | Resolved: 2020-06-03

## 2020-06-03 PROBLEM — O99.119 THROMBOCYTOPENIA AFFECTING PREGNANCY (HCC): Status: ACTIVE | Noted: 2020-06-03

## 2020-06-03 PROBLEM — D69.6 THROMBOCYTOPENIA AFFECTING PREGNANCY (HCC): Status: ACTIVE | Noted: 2020-06-03

## 2020-06-03 LAB
ABO GROUP BLD: NORMAL
ABO GROUP BLD: NORMAL
AMPHET+METHAMPHET UR QL: NEGATIVE
AMPHETAMINES UR QL: NEGATIVE
BARBITURATES UR QL SCN: NEGATIVE
BENZODIAZ UR QL SCN: NEGATIVE
BLD GP AB SCN SERPL QL: NEGATIVE
BUPRENORPHINE SERPL-MCNC: NEGATIVE NG/ML
CANNABINOIDS SERPL QL: NEGATIVE
COCAINE UR QL: NEGATIVE
DEPRECATED RDW RBC AUTO: 43.4 FL (ref 37–54)
ERYTHROCYTE [DISTWIDTH] IN BLOOD BY AUTOMATED COUNT: 12.6 % (ref 12.3–15.4)
HCT VFR BLD AUTO: 29.7 % (ref 34–46.6)
HGB BLD-MCNC: 10.1 G/DL (ref 12–15.9)
MCH RBC QN AUTO: 32.4 PG (ref 26.6–33)
MCHC RBC AUTO-ENTMCNC: 34 G/DL (ref 31.5–35.7)
MCV RBC AUTO: 95.2 FL (ref 79–97)
METHADONE UR QL SCN: NEGATIVE
OPIATES UR QL: NEGATIVE
OXYCODONE UR QL SCN: NEGATIVE
PCP UR QL SCN: NEGATIVE
PLATELET # BLD AUTO: 122 10*3/MM3 (ref 140–450)
PMV BLD AUTO: 12.1 FL (ref 6–12)
PROPOXYPH UR QL: NEGATIVE
RBC # BLD AUTO: 3.12 10*6/MM3 (ref 3.77–5.28)
RH BLD: NEGATIVE
RH BLD: NEGATIVE
T&S EXPIRATION DATE: NORMAL
TRICYCLICS UR QL SCN: NEGATIVE
WBC NRBC COR # BLD: 7.97 10*3/MM3 (ref 3.4–10.8)

## 2020-06-03 PROCEDURE — 85027 COMPLETE CBC AUTOMATED: CPT | Performed by: OBSTETRICS & GYNECOLOGY

## 2020-06-03 PROCEDURE — 86901 BLOOD TYPING SEROLOGIC RH(D): CPT | Performed by: OBSTETRICS & GYNECOLOGY

## 2020-06-03 PROCEDURE — S0260 H&P FOR SURGERY: HCPCS | Performed by: OBSTETRICS & GYNECOLOGY

## 2020-06-03 PROCEDURE — 86901 BLOOD TYPING SEROLOGIC RH(D): CPT

## 2020-06-03 PROCEDURE — 86850 RBC ANTIBODY SCREEN: CPT | Performed by: OBSTETRICS & GYNECOLOGY

## 2020-06-03 PROCEDURE — 80306 DRUG TEST PRSMV INSTRMNT: CPT | Performed by: OBSTETRICS & GYNECOLOGY

## 2020-06-03 PROCEDURE — 86900 BLOOD TYPING SEROLOGIC ABO: CPT

## 2020-06-03 PROCEDURE — 86900 BLOOD TYPING SEROLOGIC ABO: CPT | Performed by: OBSTETRICS & GYNECOLOGY

## 2020-06-03 RX ORDER — OXYTOCIN/0.9 % SODIUM CHLORIDE 30/500 ML
2 PLASTIC BAG, INJECTION (ML) INTRAVENOUS
Status: DISCONTINUED | OUTPATIENT
Start: 2020-06-04 | End: 2020-06-05

## 2020-06-03 RX ORDER — SODIUM CHLORIDE, SODIUM LACTATE, POTASSIUM CHLORIDE, CALCIUM CHLORIDE 600; 310; 30; 20 MG/100ML; MG/100ML; MG/100ML; MG/100ML
125 INJECTION, SOLUTION INTRAVENOUS CONTINUOUS
Status: DISCONTINUED | OUTPATIENT
Start: 2020-06-03 | End: 2020-06-05

## 2020-06-03 RX ORDER — SODIUM CHLORIDE 0.9 % (FLUSH) 0.9 %
10 SYRINGE (ML) INJECTION AS NEEDED
Status: DISCONTINUED | OUTPATIENT
Start: 2020-06-03 | End: 2020-06-05

## 2020-06-03 RX ORDER — SODIUM CHLORIDE 0.9 % (FLUSH) 0.9 %
3 SYRINGE (ML) INJECTION EVERY 12 HOURS SCHEDULED
Status: DISCONTINUED | OUTPATIENT
Start: 2020-06-03 | End: 2020-06-05

## 2020-06-03 RX ORDER — VALACYCLOVIR HYDROCHLORIDE 500 MG/1
500 TABLET, FILM COATED ORAL 2 TIMES DAILY
Status: ON HOLD | COMMUNITY
End: 2020-06-04

## 2020-06-03 RX ORDER — LIDOCAINE HYDROCHLORIDE 10 MG/ML
5 INJECTION, SOLUTION EPIDURAL; INFILTRATION; INTRACAUDAL; PERINEURAL AS NEEDED
Status: DISCONTINUED | OUTPATIENT
Start: 2020-06-03 | End: 2020-06-05

## 2020-06-03 RX ADMIN — DINOPROSTONE 10 MG: 10 INSERT VAGINAL at 07:30

## 2020-06-03 NOTE — H&P
OB HISTORY AND PHYSICAL      Patient Care Team:  Morro Laura MD as PCP - General (Sports Medicine)    Chief complaint: Encounter for induction of labor    24 y.o.  . Patient's last menstrual period was 2019. = 40w3d     HPI: Pt admitted for IOL for postdates pregnancy.  24 y.o. 40w3d.  Pt is GBS pos, Rh neg.  No major prenatal problems.       ACTIVE PROBLEM LIST:   Patient Active Problem List   Diagnosis   • Genital herpes affecting pregnancy, antepartum   • Rh negative, antepartum   • Eczema   • Mild tetrahydrocannabinol (THC) abuse: needs counseling   • Anemia, unspecified   • Positive GBS test- PNC allergic, clinda resistant, needs Vanc in labor   • Encounter for induction of labor   • borderline low platelets       PMHx:   Past Medical History:   Diagnosis Date   • Anemia    • Eczema of both hands    • Herpes        PSHx: History reviewed. No pertinent surgical history.    Social Hx:   Social History     Socioeconomic History   • Marital status: Single     Spouse name: Not on file   • Number of children: 0   • Years of education: Not on file   • Highest education level: Not on file   Tobacco Use   • Smoking status: Former Smoker   • Smokeless tobacco: Never Used   • Tobacco comment: occasional use    Substance and Sexual Activity   • Alcohol use: No     Frequency: Never     Comment: occasional   • Drug use: No   • Sexual activity: Defer     Partners: Male     Birth control/protection: None       FHx:   Family History   Problem Relation Age of Onset   • Cancer Mother    • Hypertension Mother    • Hypertension Father    • No Known Problems Sister    • Birth defects Paternal Grandfather    • No Known Problems Maternal Grandmother    • No Known Problems Maternal Grandfather    • No Known Problems Paternal Grandmother        Debilities/Disabilities Identified: None    Emotional Behavior: Appropriate    PGyn Hx:  otherwise neg    POBHx:   OB History    Para Term  AB Living   1 0 0 0 0  0   SAB TAB Ectopic Molar Multiple Live Births   0 0 0 0 0 0      # Outcome Date GA Lbr Juan Alberto/2nd Weight Sex Delivery Anes PTL Lv   1 Current                Allergies: Nuts; Amoxil [amoxicillin]; Flagyl [metronidazole]; and Penicillins    Medications:   Medications Prior to Admission   Medication Sig Dispense Refill Last Dose   • azithromycin (ZITHROMAX) 250 MG tablet Take 2 tablets the first day, then 1 tablet daily for 4 days. 6 tablet 0 Past Month at Unknown time   • Crisaborole 2 % ointment Apply 1 application topically 2 (Two) Times a Day. 60 g 1 Past Week at Unknown time   • diphenhydrAMINE (BENADRYL) 25 mg capsule Take 25 mg by mouth Every 6 (Six) Hours As Needed for Itching.   Past Month at Unknown time   • ferrous gluconate (FERGON) 324 MG tablet Take 1 tablet by mouth 2 (Two) Times a Day. 60 tablet 6 6/2/2020 at Unknown time   • ondansetron ODT (ZOFRAN-ODT) 4 MG disintegrating tablet DISSOLVE 1 TABLET ON THE TONGUE EVERY 8 HOURS AS NEEDED FOR NAUSEA OR VOMITING 10 tablet 0 Past Month at Unknown time   • Prenatal Vit-Fe Fumarate-FA (PRENATAL VITAMIN 27-0.8) 27-0.8 MG tablet tablet Take  by mouth Daily.   6/2/2020 at Unknown time   • valACYclovir (VALTREX) 500 MG tablet Take 500 mg by mouth 2 (Two) Times a Day.   6/2/2020 at Unknown time   • EPINEPHrine (EPIPEN 2-LISSETT) 0.3 MG/0.3ML solution auto-injector injection Use as directed as needed for allergic reaction 1 each 0 More than a month at Unknown time                              Current Facility-Administered Medications:   •  lactated ringers bolus 1,000 mL, 1,000 mL, Intravenous, Once PRN, Yasir Bernal MD  •  lactated ringers infusion, 125 mL/hr, Intravenous, Continuous, Yasir Bernal MD  •  lidocaine PF 1% (XYLOCAINE) injection 5 mL, 5 mL, Intradermal, PRN, Yasir Bernal MD  •  sodium chloride 0.9 % flush 10 mL, 10 mL, Intravenous, PRN, Yasir Bernal MD  •  sodium chloride 0.9 % flush 3 mL, 3 mL,  Intravenous, Q12H, Yasir Bernal MD    Review of Systems   Constitutional: Negative.    HENT: Negative.    Eyes: Negative.    Respiratory: Negative.    Cardiovascular: Negative.    Gastrointestinal: Negative.    Endocrine: Negative.    Musculoskeletal: Negative.    Skin: Negative.    Allergic/Immunologic: Negative.    Neurological: Negative.    Hematological: Negative.    Psychiatric/Behavioral: Negative.        Vital Signs  /81 (BP Location: Right arm, Patient Position: Sitting)   Pulse 75   Temp 98.1 °F (36.7 °C) (Axillary)   Resp 18   Wt 92.5 kg (204 lb)   LMP 08/25/2019   Breastfeeding Yes   BMI 32.93 kg/m²     Physical Exam   Constitutional: She is oriented to person, place, and time. She appears well-developed and well-nourished.   HENT:   Head: Normocephalic and atraumatic.   Eyes: EOM are normal.   Neck: Normal range of motion.   Cardiovascular: Normal rate.   Pulmonary/Chest: Effort normal.   Abdominal: Soft. She exhibits no distension and no mass. There is no tenderness. There is no guarding.   Genitourinary: No vaginal discharge found.   Musculoskeletal: Normal range of motion. She exhibits no edema, tenderness or deformity.   Neurological: She is alert and oriented to person, place, and time.   Skin: Skin is warm and dry. No rash noted. No erythema. No pallor.   Psychiatric: She has a normal mood and affect. Her behavior is normal. Judgment and thought content normal.   Nursing note and vitals reviewed.          Presentation: cephalic   Cervix: Exam by:  staff   Dilation: Cervical Dilation (cm): 1   Effacement: Cervical Effacement: 50%   Station:       Fetal Heart Rate Assessment   Method: Fetal HR Assessment Method: external   Beats/min: Fetal HR (beats/min): 130   Baseline: Fetal Heart Baseline Rate: normal range   Variability: Fetal HR Variability: moderate (amplitude range 6 to 25 bpm)   Accels: Fetal HR Accelerations: greater than/equal to 15 bpm, lasting at least 15  seconds   Decels: Fetal HR Decelerations: absent   Tracing Category:       Uterine Assessment   Method: Method: palpation, external tocotransducer   Frequency (min): Contraction Frequency (Minutes): 1.5-7   Ctx Count in 10 min: Contractions in 10 Minutes: 3   Duration:     Intensity: Contraction Intensity: mild by palpation   Intensity by IUPC:     Resting Tone: Uterine Resting Tone: soft by palpation   Resting Tone by IUPC:     [place] Font[place] Units:       Laboratory Results: hgb = 10.1, UDS neg, PLTs 122K  Radiology Review: none  Other Studies: none      Assessment:    Patient Active Problem List   Diagnosis   • Genital herpes affecting pregnancy, antepartum   • Rh negative, antepartum   • Eczema   • Mild tetrahydrocannabinol (THC) abuse: needs counseling   • Anemia, unspecified   • Positive GBS test- PNC allergic, clinda resistant, needs Vanc in labor   • Encounter for induction of labor   • borderline low platelets       1.  Intrauterine pregnancy at 40w3d gestation with reactive fetal status.    2.  induction of labor  for postdates  with unfavorable cervix  3.  Obstetrical history significant for as above.  4.  GBS status:   Strep Gp B Culture   Date Value Ref Range Status   2020 Positive (A) Negative Final     Comment:     Centers for Disease Control and Prevention (CDC) and American Congress  of Obstetricians and Gynecologists (ACOG) guidelines for prevention of   group B streptococcal (GBS) disease specify co-collection of  a vaginal and rectal swab specimen to maximize sensitivity of GBS  detection. Per the CDC and ACOG, swabbing both the lower vagina and  rectum substantially increases the yield of detection compared with  sampling the vagina alone.  Penicillin G, ampicillin, or cefazolin are indicated for intrapartum  prophylaxis of  GBS colonization. Reflex susceptibility  testing should be performed prior to use of clindamycin only on GBS  isolates from penicillin-allergic women who  are considered a high risk  for anaphylaxis. Treatment with vancomycin without additional testing  is warranted if resistance to clindamycin is noted.         Plan:  1. Vaginal anticipated  2. Plan of care has been reviewed with patient and mother  3.  Risks, benefits of treatment plan have been discussed.  4.  All questions have been answered.  5.  RISKS, ALTERNATIVES, COMPLICATIONS OF THE PROCEDURE INCLUDING BUT NOT LIMITED TO:    INTRAOPERATIVE RISKS: INJURY TO INTERNAL ORGANS (BOWEL, BLADDER, URETER,BLOOD VESSELS) OR HEMORRHAGE REQUIRING FURTHER SURGERY, INFECTION, AND DEATH;   POSTOP COMPLICATIONS: BLEEDING, INFECTION, PNEUMONIA, PULMONARY EMBOLISM, AND DEATH;   WERE EXPLAINED TO THE PT WHO VERBALIZED HER UNDERSTANDING.            I discussed the patients findings and my recommendations with patient and family.     Yasir Bernal MD  06/03/20  08:52

## 2020-06-03 NOTE — PLAN OF CARE
Problem: Patient Care Overview  Goal: Plan of Care Review  Flowsheets (Taken 6/3/2020 1837)  Progress: improving  Plan of Care Reviewed With: patient; mother  Outcome Summary: VSS, cervidil placed this am at 0730, up ad charles     Problem: Labor (Cervical Ripen, Induct, Augment) (Adult,Obstetrics,Pediatric)  Goal: Signs and Symptoms of Listed Potential Problems Will be Absent, Minimized or Managed (Labor)  Flowsheets (Taken 6/3/2020 1837)  Problems Assessed (Labor): all  Problems Present (Labor): none

## 2020-06-04 PROBLEM — Z34.90 ENCOUNTER FOR INDUCTION OF LABOR: Status: RESOLVED | Noted: 2020-06-03 | Resolved: 2020-06-04

## 2020-06-04 PROCEDURE — 25010000002 FENTANYL CITRATE (PF) 100 MCG/2ML SOLUTION: Performed by: NURSE ANESTHETIST, CERTIFIED REGISTERED

## 2020-06-04 PROCEDURE — 25010000002 FENTANYL CITRATE (PF) 100 MCG/2ML SOLUTION: Performed by: OBSTETRICS & GYNECOLOGY

## 2020-06-04 PROCEDURE — 59510 CESAREAN DELIVERY: CPT | Performed by: OBSTETRICS & GYNECOLOGY

## 2020-06-04 PROCEDURE — 25010000002 KETOROLAC TROMETHAMINE PER 15 MG: Performed by: NURSE ANESTHETIST, CERTIFIED REGISTERED

## 2020-06-04 PROCEDURE — 25010000003 CEFAZOLIN SODIUM-DEXTROSE 2-3 GM-%(50ML) RECONSTITUTED SOLUTION: Performed by: OBSTETRICS & GYNECOLOGY

## 2020-06-04 PROCEDURE — C1755 CATHETER, INTRASPINAL: HCPCS | Performed by: NURSE ANESTHETIST, CERTIFIED REGISTERED

## 2020-06-04 PROCEDURE — 99024 POSTOP FOLLOW-UP VISIT: CPT | Performed by: OBSTETRICS & GYNECOLOGY

## 2020-06-04 PROCEDURE — 94799 UNLISTED PULMONARY SVC/PX: CPT

## 2020-06-04 PROCEDURE — 25010000002 AZITHROMYCIN PER 500 MG: Performed by: OBSTETRICS & GYNECOLOGY

## 2020-06-04 RX ORDER — BUPIVACAINE HYDROCHLORIDE 5 MG/ML
INJECTION, SOLUTION EPIDURAL; INTRACAUDAL AS NEEDED
Status: DISCONTINUED | OUTPATIENT
Start: 2020-06-04 | End: 2020-06-04 | Stop reason: SURG

## 2020-06-04 RX ORDER — DIPHENHYDRAMINE HYDROCHLORIDE 50 MG/ML
25 INJECTION INTRAMUSCULAR; INTRAVENOUS EVERY 4 HOURS PRN
Status: DISCONTINUED | OUTPATIENT
Start: 2020-06-04 | End: 2020-06-05

## 2020-06-04 RX ORDER — SODIUM CHLORIDE 0.9 % (FLUSH) 0.9 %
3 SYRINGE (ML) INJECTION EVERY 12 HOURS SCHEDULED
Status: DISCONTINUED | OUTPATIENT
Start: 2020-06-04 | End: 2020-06-05

## 2020-06-04 RX ORDER — CEFAZOLIN SODIUM 2 G/50ML
2 SOLUTION INTRAVENOUS ONCE
Status: COMPLETED | OUTPATIENT
Start: 2020-06-04 | End: 2020-06-04

## 2020-06-04 RX ORDER — OXYTOCIN/0.9 % SODIUM CHLORIDE 30/500 ML
85 PLASTIC BAG, INJECTION (ML) INTRAVENOUS ONCE
Status: DISCONTINUED | OUTPATIENT
Start: 2020-06-04 | End: 2020-06-07 | Stop reason: HOSPADM

## 2020-06-04 RX ORDER — HYDROMORPHONE HCL 110MG/55ML
0.5 PATIENT CONTROLLED ANALGESIA SYRINGE INTRAVENOUS
Status: DISCONTINUED | OUTPATIENT
Start: 2020-06-04 | End: 2020-06-05

## 2020-06-04 RX ORDER — SODIUM CHLORIDE 9 MG/ML
INJECTION, SOLUTION INTRAVENOUS
Status: DISPENSED
Start: 2020-06-04 | End: 2020-06-05

## 2020-06-04 RX ORDER — FENTANYL CITRATE 50 UG/ML
INJECTION, SOLUTION INTRAMUSCULAR; INTRAVENOUS AS NEEDED
Status: DISCONTINUED | OUTPATIENT
Start: 2020-06-04 | End: 2020-06-04 | Stop reason: SURG

## 2020-06-04 RX ORDER — ONDANSETRON 4 MG/1
4 TABLET, FILM COATED ORAL EVERY 8 HOURS PRN
Status: DISCONTINUED | OUTPATIENT
Start: 2020-06-04 | End: 2020-06-07 | Stop reason: HOSPADM

## 2020-06-04 RX ORDER — METHYLERGONOVINE MALEATE 0.2 MG/ML
200 INJECTION INTRAVENOUS ONCE AS NEEDED
Status: DISCONTINUED | OUTPATIENT
Start: 2020-06-04 | End: 2020-06-07 | Stop reason: HOSPADM

## 2020-06-04 RX ORDER — PRENATAL VIT/IRON FUM/FOLIC AC 27MG-0.8MG
1 TABLET ORAL DAILY
Status: DISCONTINUED | OUTPATIENT
Start: 2020-06-04 | End: 2020-06-07 | Stop reason: HOSPADM

## 2020-06-04 RX ORDER — DIPHENHYDRAMINE HCL 25 MG
25 CAPSULE ORAL EVERY 4 HOURS PRN
Status: DISCONTINUED | OUTPATIENT
Start: 2020-06-04 | End: 2020-06-05

## 2020-06-04 RX ORDER — SODIUM CHLORIDE, SODIUM LACTATE, POTASSIUM CHLORIDE, CALCIUM CHLORIDE 600; 310; 30; 20 MG/100ML; MG/100ML; MG/100ML; MG/100ML
125 INJECTION, SOLUTION INTRAVENOUS CONTINUOUS
Status: DISCONTINUED | OUTPATIENT
Start: 2020-06-04 | End: 2020-06-05

## 2020-06-04 RX ORDER — KETOROLAC TROMETHAMINE 30 MG/ML
30 INJECTION, SOLUTION INTRAMUSCULAR; INTRAVENOUS EVERY 6 HOURS
Status: DISCONTINUED | OUTPATIENT
Start: 2020-06-04 | End: 2020-06-05

## 2020-06-04 RX ORDER — HYDROCODONE BITARTRATE AND ACETAMINOPHEN 10; 325 MG/1; MG/1
1 TABLET ORAL EVERY 4 HOURS PRN
Status: DISCONTINUED | OUTPATIENT
Start: 2020-06-04 | End: 2020-06-05

## 2020-06-04 RX ORDER — IBUPROFEN 800 MG/1
800 TABLET ORAL EVERY 8 HOURS PRN
Status: DISCONTINUED | OUTPATIENT
Start: 2020-06-05 | End: 2020-06-07 | Stop reason: HOSPADM

## 2020-06-04 RX ORDER — LIDOCAINE HYDROCHLORIDE 10 MG/ML
5 INJECTION, SOLUTION EPIDURAL; INFILTRATION; INTRACAUDAL; PERINEURAL AS NEEDED
Status: DISCONTINUED | OUTPATIENT
Start: 2020-06-04 | End: 2020-06-05

## 2020-06-04 RX ORDER — CARBOPROST TROMETHAMINE 250 UG/ML
250 INJECTION, SOLUTION INTRAMUSCULAR AS NEEDED
Status: DISCONTINUED | OUTPATIENT
Start: 2020-06-04 | End: 2020-06-07 | Stop reason: HOSPADM

## 2020-06-04 RX ORDER — LANOLIN 100 %
OINTMENT (GRAM) TOPICAL
Status: DISCONTINUED | OUTPATIENT
Start: 2020-06-04 | End: 2020-06-07 | Stop reason: HOSPADM

## 2020-06-04 RX ORDER — ONDANSETRON 2 MG/ML
4 INJECTION INTRAMUSCULAR; INTRAVENOUS
Status: DISCONTINUED | OUTPATIENT
Start: 2020-06-04 | End: 2020-06-05

## 2020-06-04 RX ORDER — VALACYCLOVIR HYDROCHLORIDE 500 MG/1
500 TABLET, FILM COATED ORAL EVERY 12 HOURS SCHEDULED
Status: DISCONTINUED | OUTPATIENT
Start: 2020-06-04 | End: 2020-06-05

## 2020-06-04 RX ORDER — FERROUS GLUCONATE 324(37.5)
324 TABLET ORAL 2 TIMES DAILY
Status: DISCONTINUED | OUTPATIENT
Start: 2020-06-04 | End: 2020-06-07 | Stop reason: HOSPADM

## 2020-06-04 RX ORDER — ONDANSETRON 2 MG/ML
4 INJECTION INTRAMUSCULAR; INTRAVENOUS ONCE AS NEEDED
Status: DISCONTINUED | OUTPATIENT
Start: 2020-06-04 | End: 2020-06-05

## 2020-06-04 RX ORDER — OXYCODONE HYDROCHLORIDE AND ACETAMINOPHEN 5; 325 MG/1; MG/1
2 TABLET ORAL EVERY 4 HOURS PRN
Status: DISCONTINUED | OUTPATIENT
Start: 2020-06-04 | End: 2020-06-07 | Stop reason: HOSPADM

## 2020-06-04 RX ORDER — MISOPROSTOL 200 UG/1
800 TABLET ORAL AS NEEDED
Status: DISCONTINUED | OUTPATIENT
Start: 2020-06-04 | End: 2020-06-07 | Stop reason: HOSPADM

## 2020-06-04 RX ORDER — SODIUM CHLORIDE 0.9 % (FLUSH) 0.9 %
1-10 SYRINGE (ML) INJECTION AS NEEDED
Status: DISCONTINUED | OUTPATIENT
Start: 2020-06-04 | End: 2020-06-05

## 2020-06-04 RX ORDER — KETOROLAC TROMETHAMINE 30 MG/ML
INJECTION, SOLUTION INTRAMUSCULAR; INTRAVENOUS AS NEEDED
Status: DISCONTINUED | OUTPATIENT
Start: 2020-06-04 | End: 2020-06-04 | Stop reason: SURG

## 2020-06-04 RX ORDER — LIDOCAINE HYDROCHLORIDE AND EPINEPHRINE 15; 5 MG/ML; UG/ML
INJECTION, SOLUTION EPIDURAL AS NEEDED
Status: DISCONTINUED | OUTPATIENT
Start: 2020-06-04 | End: 2020-06-04 | Stop reason: SURG

## 2020-06-04 RX ORDER — AZITHROMYCIN 500 MG/1
INJECTION, POWDER, LYOPHILIZED, FOR SOLUTION INTRAVENOUS
Status: DISPENSED
Start: 2020-06-04 | End: 2020-06-05

## 2020-06-04 RX ORDER — SUFENTANIL CITRATE 50 UG/ML
INJECTION EPIDURAL; INTRAVENOUS
Status: DISPENSED
Start: 2020-06-04 | End: 2020-06-04

## 2020-06-04 RX ORDER — FENTANYL CITRATE 50 UG/ML
100 INJECTION, SOLUTION INTRAMUSCULAR; INTRAVENOUS
Status: DISCONTINUED | OUTPATIENT
Start: 2020-06-04 | End: 2020-06-05

## 2020-06-04 RX ORDER — OXYTOCIN/0.9 % SODIUM CHLORIDE 30/500 ML
650 PLASTIC BAG, INJECTION (ML) INTRAVENOUS ONCE
Status: COMPLETED | OUTPATIENT
Start: 2020-06-04 | End: 2020-06-04

## 2020-06-04 RX ORDER — HYDROCODONE BITARTRATE AND ACETAMINOPHEN 5; 325 MG/1; MG/1
1 TABLET ORAL EVERY 4 HOURS PRN
Status: DISCONTINUED | OUTPATIENT
Start: 2020-06-04 | End: 2020-06-05

## 2020-06-04 RX ADMIN — CEFAZOLIN SODIUM 2 G: 2 SOLUTION INTRAVENOUS at 15:38

## 2020-06-04 RX ADMIN — Medication: at 18:50

## 2020-06-04 RX ADMIN — OXYTOCIN-SODIUM CHLORIDE 0.9% IV SOLN 30 UNIT/500ML 650 ML/HR: 30-0.9/5 SOLUTION at 17:17

## 2020-06-04 RX ADMIN — KETOROLAC TROMETHAMINE 30 MG: 30 INJECTION, SOLUTION INTRAMUSCULAR at 22:47

## 2020-06-04 RX ADMIN — FENTANYL CITRATE 100 MCG: 50 INJECTION INTRAMUSCULAR; INTRAVENOUS at 09:43

## 2020-06-04 RX ADMIN — LIDOCAINE HYDROCHLORIDE AND EPINEPHRINE 3 ML: 15; 5 INJECTION, SOLUTION EPIDURAL at 10:03

## 2020-06-04 RX ADMIN — VALACYCLOVIR 500 MG: 500 TABLET, FILM COATED ORAL at 12:16

## 2020-06-04 RX ADMIN — BUPIVACAINE HYDROCHLORIDE 5 ML: 5 INJECTION, SOLUTION EPIDURAL; INTRACAUDAL; PERINEURAL at 15:20

## 2020-06-04 RX ADMIN — OXYTOCIN-SODIUM CHLORIDE 0.9% IV SOLN 30 UNIT/500ML 2 MILLI-UNITS/MIN: 30-0.9/5 SOLUTION at 02:22

## 2020-06-04 RX ADMIN — VANCOMYCIN HYDROCHLORIDE 1000 MG: 1 INJECTION, POWDER, LYOPHILIZED, FOR SOLUTION INTRAVENOUS at 06:41

## 2020-06-04 RX ADMIN — FENTANYL CITRATE 100 MCG: 50 INJECTION INTRAMUSCULAR; INTRAVENOUS at 07:36

## 2020-06-04 RX ADMIN — BUPIVACAINE HYDROCHLORIDE 5 ML: 5 INJECTION, SOLUTION EPIDURAL; INTRACAUDAL; PERINEURAL at 15:27

## 2020-06-04 RX ADMIN — OXYTOCIN-SODIUM CHLORIDE 0.9% IV SOLN 30 UNIT/500ML 175 ML/HR: 30-0.9/5 SOLUTION at 16:09

## 2020-06-04 RX ADMIN — SODIUM CHLORIDE, POTASSIUM CHLORIDE, SODIUM LACTATE AND CALCIUM CHLORIDE 125 ML/HR: 600; 310; 30; 20 INJECTION, SOLUTION INTRAVENOUS at 21:30

## 2020-06-04 RX ADMIN — SUFENTANIL CITRATE 12 ML/HR: 50 INJECTION EPIDURAL; INTRAVENOUS at 10:15

## 2020-06-04 RX ADMIN — SODIUM CHLORIDE, POTASSIUM CHLORIDE, SODIUM LACTATE AND CALCIUM CHLORIDE 999 ML/HR: 600; 310; 30; 20 INJECTION, SOLUTION INTRAVENOUS at 09:13

## 2020-06-04 RX ADMIN — SODIUM CHLORIDE, POTASSIUM CHLORIDE, SODIUM LACTATE AND CALCIUM CHLORIDE 999 ML/HR: 600; 310; 30; 20 INJECTION, SOLUTION INTRAVENOUS at 12:10

## 2020-06-04 RX ADMIN — HYDROCODONE BITARTRATE AND ACETAMINOPHEN 1 TABLET: 5; 325 TABLET ORAL at 19:47

## 2020-06-04 RX ADMIN — FENTANYL CITRATE 100 MCG: 50 INJECTION, SOLUTION INTRAMUSCULAR; INTRAVENOUS at 15:27

## 2020-06-04 RX ADMIN — BUPIVACAINE HYDROCHLORIDE 5 ML: 5 INJECTION, SOLUTION EPIDURAL; INTRACAUDAL; PERINEURAL at 15:40

## 2020-06-04 RX ADMIN — SODIUM CHLORIDE, POTASSIUM CHLORIDE, SODIUM LACTATE AND CALCIUM CHLORIDE 123 ML/HR: 600; 310; 30; 20 INJECTION, SOLUTION INTRAVENOUS at 02:21

## 2020-06-04 RX ADMIN — LIDOCAINE HYDROCHLORIDE AND EPINEPHRINE 2 ML: 15; 5 INJECTION, SOLUTION EPIDURAL at 10:08

## 2020-06-04 RX ADMIN — KETOROLAC TROMETHAMINE 30 MG: 30 INJECTION INTRAMUSCULAR; INTRAVENOUS at 16:22

## 2020-06-04 RX ADMIN — SODIUM CHLORIDE 500 MG: 900 INJECTION, SOLUTION INTRAVENOUS at 16:01

## 2020-06-04 RX ADMIN — SODIUM CHLORIDE, POTASSIUM CHLORIDE, SODIUM LACTATE AND CALCIUM CHLORIDE: 600; 310; 30; 20 INJECTION, SOLUTION INTRAVENOUS at 15:49

## 2020-06-04 RX ADMIN — PRENATAL VIT W/ FE FUMARATE-FA TAB 27-0.8 MG 1 TABLET: 27-0.8 TAB at 18:43

## 2020-06-04 NOTE — L&D DELIVERY NOTE
JORDIN Leija   Delivery Note    Delivery     Delivery: , Low Transverse     YOB: 2019    Time of Birth:  Gestational Age 4:08 PM   40w4d     Anesthesia: Epidural     Delivering clinician: Yasir Andersoning    Forceps?   No   Vacuum? No    Shoulder dystocia present: No        Delivery narrative:    PROCEDURE: PRIMARY LOW TRANSVERSE  SECTION    PREOP DIAGNOSIS:  FAILURE TO PROGRESS @ 40+WEEKS    POSTOP DIAGNOSIS:  SAME    SURGEON:   Dolores    ASSIST:  Althea, responsible for retracting, suturing, delivery of fetus, closing and placing dressing.    ANESTHESIA:  epidural    EBL:   1000cc    IVFS:  100cc    UO:  150cc    COMPLICATIONS:  none    FINDINGS:  1 live, viable female, Apgars: 8, 9, wt = 7-14 @ 16:08    DESCRIPTION OF THE PROCEDURE:      The patient was taken to the OR and placed on the table in the dorsosupine position.  Adequate epidural anesthesia was ensured.     Pt was prepped and draped.  IV antibiotics were given, time out was done.    A Pfannenstiel incision was made with a knife and carried down sharply till the fascia was encountered.  The fascia was scored in the midline and extended bilaterally.  The fascia was then dissected bluntly and sharply off the rectus muscle bellies in a superior and inferior direction. The muscles were divided in the midline and the preperitoneal tissue was picked up with hemostats, incised, the peritoneal cavity thus being entered.  This opening was extended bluntly.    A low transverse incision was made with a knife without developing the bladder flap and the amniotic cavity was entered.  There was light meconium stained amniotic fluid.  This opening was extended bluntly superiorly and inferiorly.    Pt was delivered of 1 live viable female from the cephalic position.  There was no nuchal cord.  Infant was completely delivered, bulb suctioned, cord doubly clamped and divided and infant handed to nurse in attendance.  Cord  blood was drawn, placenta delivered manually, intact w/ 3VC and was not sent to pathology.    The uterus was exteriorized out of the abdominal cavity and wiped clean with a lap.  The uterine incision was reapproximated with 0 chromic in a running, interlocking stitch till completely hemostatic.  Any bleeders were bovied or oversewn.  The uterus was returned to the abdominal cavity and it was irrigated with copious amounts of warm water.  The uterine incision was reinspected and found to be completely hemostatic.    Both tubes and ovaries were normal, and the rest of the abdominal cavity was palpably normal.  All instruments were removed. Before each level of closure, copious irrigation was carried out and hemostasis was ensured.     The urine was clear at the termination of the procedure.     The fascia was closed with 0 vicryl in a running stitch. The subcutaneous layer was closed by the assistant as was the skin.      Pt tolerated procedure well and went to the  in satis condition.  All sponge, instrument and needle counts were correct x 3 according to the operating room personnel.      Infant    Findings: female  infant     Infant observations: Weight: No birth weight on file.   Length:    in  Observations/Comments:         Apgars:    8@ 1 minute /       9@ 5 minutes   Infant Name:      Placenta, Cord, and Fluid    Placenta delivered  Manual removal  at   6/4/2020  4:09 PM     Cord:    present.   Nuchal Cord?  no   Cord blood obtained:      Cord gases obtained:       Cord gas results: Venous:  No results found for: PHCVEN    Arterial:  No results found for: PHCART     Repair    Episiotomy: Not recorded     No    Lacerations: No   Estimated Blood Loss:  1000cc           Complications  none    Disposition  Mother to Mother Baby/Postpartum  in stable condition currently.  Baby to NBN  in stable condition currently.      Yasir Bernal MD  06/04/20  16:37

## 2020-06-04 NOTE — ANESTHESIA POSTPROCEDURE EVALUATION
Patient: Mary Sun    Procedure Summary     Date:  20 Room / Location:  formerly [facility] LABOR DELIVERY  formerly [facility] LABOR DELIVERY    Anesthesia Start:  944 Anesthesia Stop:      Procedure:   SECTION PRIMARY (N/A Abdomen) Diagnosis:      Surgeon:  Yasir Bernal MD Provider:  Sammy Lee CRNA    Anesthesia Type:  epidural ASA Status:  2          Anesthesia Type: epidural    Vitals  Vitals Value Taken Time   /92 2020  3:31 PM   Temp 97.2 °F (36.2 °C) 2020  1:17 PM   Pulse 106 2020  3:31 PM   Resp     SpO2 90 % 2020 12:16 PM   Vitals shown include unvalidated device data.        Post Anesthesia Care and Evaluation    Patient location during evaluation: bedside  Patient participation: complete - patient participated  Level of consciousness: awake and alert  Pain score: 0  Pain management: adequate  Airway patency: patent  Anesthetic complications: No anesthetic complications  PONV Status: none  Cardiovascular status: acceptable  Respiratory status: acceptable  Hydration status: acceptable

## 2020-06-04 NOTE — PLAN OF CARE
Problem: Patient Care Overview  Goal: Plan of Care Review  Outcome: Ongoing (interventions implemented as appropriate)  Flowsheets  Taken 6/3/2020 1837 by Isi Escobar, RN  Progress: improving  Plan of Care Reviewed With: patient;mother  Taken 6/4/2020 0734 by , Susy WELSH RN  Outcome Summary: VSS, cervidil removed at 1930, showered, provided a meal, up ambulating on unit, resting, pitocin started at 0220, antibiotics started for GBS+, coping wiht labor well

## 2020-06-04 NOTE — PROGRESS NOTES
24-year-old G1, P0 at 40 weeks and 4 days for induction of labor.  Patient is on day 2 of her induction of labor.  Patient received Cervidil yesterday for cervical ripening.  She was started on Pitocin last night.  Patient was started on vancomycin for GBS prophylaxis.  Artificial rupture membranes was performed this morning with light meconium was noted.  Patient is currently 4 cm dilated and 50% effaced.  Patient is requesting her epidural.  An IUPC was placed.  NST is reactive.

## 2020-06-04 NOTE — PROGRESS NOTES
PROGRESS NOTE    S:  Pt comfortable, tired.    O:  /78   Pulse 74   Temp 97.2 °F (36.2 °C) (Axillary)   Resp 18   Wt 92.5 kg (204 lb)   LMP 08/25/2019   SpO2 90%   Breastfeeding Yes   BMI 32.93 kg/m²     Cx:  Thick, 7, -3, + caput per Dr. Sousa    FHTs R    MVUs inadequate on pitocin @ 14:45    A:  Failure to progress    P:  Primary low transverse CSection.    RISKS, ALTERNATIVES, COMPLICATIONS OF THE PROCEDURE INCLUDING BUT NOT LIMITED TO:    INTRAOPERATIVE RISKS: INJURY TO INTERNAL ORGANS (BOWEL, BLADDER, URETER,BLOOD VESSELS) OR HEMORRHAGE REQUIRING FURTHER SURGERY, INFECTION, AND DEATH;   POSTOP COMPLICATIONS: BLEEDING, INFECTION, PNEUMONIA, PULMONARY EMBOLISM, AND DEATH;   WERE EXPLAINED TO THE PT WHO VERBALIZED HER UNDERSTANDING.      Yasir Bernal MD  15:24  06/04/20

## 2020-06-04 NOTE — OP NOTE
OPERATIVE REPORT    PROCEDURE: PRIMARY LOW TRANSVERSE  SECTION    PREOP DIAGNOSIS:  FAILURE TO PROGRESS @ 40+WEEKS    POSTOP DIAGNOSIS:  SAME    SURGEON:   Dolores    ASSIST:  Althea, responsible for retracting, suturing, delivery of fetus, closing and placing dressing.    ANESTHESIA:  epidural    EBL:   1000cc    IVFS:  100cc    UO:  150cc    COMPLICATIONS:  none    FINDINGS:  1 live, viable female, Apgars: 8, 9, wt = 7-14 @ 16:08    DESCRIPTION OF THE PROCEDURE:      The patient was taken to the OR and placed on the table in the dorsosupine position.  Adequate epidural anesthesia was ensured.     Pt was prepped and draped.  IV antibiotics were given, time out was done.    A Pfannenstiel incision was made with a knife and carried down sharply till the fascia was encountered.  The fascia was scored in the midline and extended bilaterally.  The fascia was then dissected bluntly and sharply off the rectus muscle bellies in a superior and inferior direction. The muscles were divided in the midline and the preperitoneal tissue was picked up with hemostats, incised, the peritoneal cavity thus being entered.  This opening was extended bluntly.    A low transverse incision was made with a knife without developing the bladder flap and the amniotic cavity was entered.  There was light meconium stained amniotic fluid.  This opening was extended bluntly superiorly and inferiorly.    Pt was delivered of 1 live viable female from the cephalic position.  There was no nuchal cord.  Infant was completely delivered, bulb suctioned, cord doubly clamped and divided and infant handed to nurse in attendance.  Cord blood was drawn, placenta delivered manually, intact w/ 3VC and was not sent to pathology.    The uterus was exteriorized out of the abdominal cavity and wiped clean with a lap.  The uterine incision was reapproximated with 0 chromic in a running, interlocking stitch till completely hemostatic.  Any bleeders were  bovied or oversewn.  The uterus was returned to the abdominal cavity and it was irrigated with copious amounts of warm water.  The uterine incision was reinspected and found to be completely hemostatic.    Both tubes and ovaries were normal, and the rest of the abdominal cavity was palpably normal.  All instruments were removed. Before each level of closure, copious irrigation was carried out and hemostasis was ensured.     The urine was clear at the termination of the procedure.     The fascia was closed with 0 vicryl in a running stitch. The subcutaneous layer was closed by the assistant as was the skin.      Pt tolerated procedure well and went to the RR in satis condition.  All sponge, instrument and needle counts were correct x 3 according to the operating room personnel.      Yasir Bernal MD  16:33  06/04/20

## 2020-06-04 NOTE — PROGRESS NOTES
PROGRESS NOTE    S:  Pt states she is comfortable    O:  /85   Pulse 84   Temp 97.7 °F (36.5 °C) (Oral)   Resp 18   Wt 92.5 kg (204 lb)   LMP 08/25/2019   SpO2 100%   Breastfeeding Yes   BMI 32.93 kg/m²     FHTs R    Cxts q 2-4 mins on 14 miu pit/min    Cx:  5cms per nursing @ 12N    FSE & IUPC placed.    A:  Active labor, stable fetus, mildly elevated bp    P:  Continue IOL.    Yasir Bernal MD  12:52  06/04/20

## 2020-06-04 NOTE — PLAN OF CARE
Problem: Patient Care Overview  Goal: Plan of Care Review  Outcome: Ongoing (interventions implemented as appropriate)  Flowsheets (Taken 2020)  Progress: improving  Plan of Care Reviewed With: patient  Outcome Summary: vss, primary c section, recoving well, lochia wdl, patient bonding with baby well     Problem: Labor (Cervical Ripen, Induct, Augment) (Adult,Obstetrics,Pediatric)  Goal: Signs and Symptoms of Listed Potential Problems Will be Absent, Minimized or Managed (Labor)  Outcome: Ongoing (interventions implemented as appropriate)     Problem: Postpartum ( Delivery) (Adult,Obstetrics,Pediatric)  Goal: Signs and Symptoms of Listed Potential Problems Will be Absent, Minimized or Managed (Postpartum)  Outcome: Ongoing (interventions implemented as appropriate)  Flowsheets (Taken 2020)  Problems Assessed (Postpartum ): all  Problems Present (Postpartum ): none

## 2020-06-04 NOTE — ANESTHESIA PROCEDURE NOTES
Labor Epidural      Patient reassessed immediately prior to procedure    Patient location during procedure: OB  Start Time: 6/4/2020 9:57 AM  Stop Time: 6/4/2020 10:03 AM  Indication:at surgeon's request  Performed By  CRNA: Ina Lopez CRNA  Preanesthetic Checklist  Completed: patient identified, site marked, surgical consent, pre-op evaluation, timeout performed, IV checked, risks and benefits discussed and monitors and equipment checked  Additional Notes  Lidocaine 1ml 2% skin infiltration   Prep:  Pt Position:sitting  Sterile Tech:cap, gloves, mask and sterile barrier  Prep:povidone-iodine 7.5% surgical scrub  Monitoring:blood pressure monitoring, continuous pulse oximetry and EKG  Epidural Block Procedure:  Approach:midline  Guidance:landmark technique and palpation technique  Location:L3-L4  Needle Type:Tuohy  Needle Gauge:19 G  Loss of Resistance Medium: air  Paresthesia: none  Aspiration:negative  Test Dose:negative  Number of Attempts: 1  Post Assessment:  Dressing:secured with tape  Pt Tolerance:patient tolerated the procedure well with no apparent complications  Complications:no

## 2020-06-04 NOTE — ANESTHESIA PREPROCEDURE EVALUATION
Anesthesia Evaluation     Patient summary reviewed and Nursing notes reviewed   no history of anesthetic complications:  NPO Solid Status: N/A  NPO Liquid Status: N/A           Airway   Mallampati: II  TM distance: >3 FB  Neck ROM: full  Possible difficult intubation  Dental      Pulmonary - negative pulmonary ROS   Cardiovascular   Exercise tolerance: good (4-7 METS)    Rhythm: regular  Rate: normal        Neuro/Psych- negative ROS  GI/Hepatic/Renal/Endo - negative ROS     Musculoskeletal (-) negative ROS    Abdominal    Substance History - negative use     OB/GYN    (+) Pregnant,         Other - negative ROS                       Anesthesia Plan    ASA 2     epidural       Anesthetic plan, all risks, benefits, and alternatives have been provided, discussed and informed consent has been obtained with: patient.  Use of blood products discussed with patient  Consented to blood products.

## 2020-06-05 LAB
BASOPHILS # BLD AUTO: 0.02 10*3/MM3 (ref 0–0.2)
BASOPHILS NFR BLD AUTO: 0.2 % (ref 0–1.5)
DEPRECATED RDW RBC AUTO: 43.9 FL (ref 37–54)
EOSINOPHIL # BLD AUTO: 0.08 10*3/MM3 (ref 0–0.4)
EOSINOPHIL NFR BLD AUTO: 0.7 % (ref 0.3–6.2)
ERYTHROCYTE [DISTWIDTH] IN BLOOD BY AUTOMATED COUNT: 12.6 % (ref 12.3–15.4)
FETAL BLEED: NEGATIVE
HCT VFR BLD AUTO: 25.9 % (ref 34–46.6)
HGB BLD-MCNC: 9 G/DL (ref 12–15.9)
IMM GRANULOCYTES # BLD AUTO: 0.06 10*3/MM3 (ref 0–0.05)
IMM GRANULOCYTES NFR BLD AUTO: 0.5 % (ref 0–0.5)
LYMPHOCYTES # BLD AUTO: 1.39 10*3/MM3 (ref 0.7–3.1)
LYMPHOCYTES NFR BLD AUTO: 12.2 % (ref 19.6–45.3)
MCH RBC QN AUTO: 33.1 PG (ref 26.6–33)
MCHC RBC AUTO-ENTMCNC: 34.7 G/DL (ref 31.5–35.7)
MCV RBC AUTO: 95.2 FL (ref 79–97)
MONOCYTES # BLD AUTO: 1.02 10*3/MM3 (ref 0.1–0.9)
MONOCYTES NFR BLD AUTO: 8.9 % (ref 5–12)
NEUTROPHILS # BLD AUTO: 8.87 10*3/MM3 (ref 1.7–7)
NEUTROPHILS NFR BLD AUTO: 77.5 % (ref 42.7–76)
NRBC BLD AUTO-RTO: 0 /100 WBC (ref 0–0.2)
NUMBER OF DOSES: NORMAL
PLATELET # BLD AUTO: 108 10*3/MM3 (ref 140–450)
PMV BLD AUTO: 12.3 FL (ref 6–12)
RBC # BLD AUTO: 2.72 10*6/MM3 (ref 3.77–5.28)
WBC NRBC COR # BLD: 11.44 10*3/MM3 (ref 3.4–10.8)

## 2020-06-05 PROCEDURE — 25010000002 RHO D IMMUNE GLOBULIN 1500 UNIT/2ML SOLUTION PREFILLED SYRINGE: Performed by: OBSTETRICS & GYNECOLOGY

## 2020-06-05 PROCEDURE — 85461 HEMOGLOBIN FETAL: CPT | Performed by: OBSTETRICS & GYNECOLOGY

## 2020-06-05 PROCEDURE — 99024 POSTOP FOLLOW-UP VISIT: CPT | Performed by: OBSTETRICS & GYNECOLOGY

## 2020-06-05 PROCEDURE — 25010000002 KETOROLAC TROMETHAMINE PER 15 MG

## 2020-06-05 PROCEDURE — 85025 COMPLETE CBC W/AUTO DIFF WBC: CPT | Performed by: OBSTETRICS & GYNECOLOGY

## 2020-06-05 RX ORDER — DOCUSATE SODIUM 100 MG/1
100 CAPSULE, LIQUID FILLED ORAL 2 TIMES DAILY PRN
Status: DISCONTINUED | OUTPATIENT
Start: 2020-06-05 | End: 2020-06-07 | Stop reason: HOSPADM

## 2020-06-05 RX ORDER — SODIUM CHLORIDE, SODIUM LACTATE, POTASSIUM CHLORIDE, CALCIUM CHLORIDE 600; 310; 30; 20 MG/100ML; MG/100ML; MG/100ML; MG/100ML
125 INJECTION, SOLUTION INTRAVENOUS CONTINUOUS
Status: DISCONTINUED | OUTPATIENT
Start: 2020-06-05 | End: 2020-06-07 | Stop reason: HOSPADM

## 2020-06-05 RX ORDER — METHYLERGONOVINE MALEATE 0.2 MG/ML
200 INJECTION INTRAVENOUS ONCE AS NEEDED
Status: DISCONTINUED | OUTPATIENT
Start: 2020-06-05 | End: 2020-06-07 | Stop reason: HOSPADM

## 2020-06-05 RX ORDER — KETOROLAC TROMETHAMINE 30 MG/ML
INJECTION, SOLUTION INTRAMUSCULAR; INTRAVENOUS
Status: COMPLETED
Start: 2020-06-05 | End: 2020-06-05

## 2020-06-05 RX ORDER — OXYTOCIN/0.9 % SODIUM CHLORIDE 30/500 ML
650 PLASTIC BAG, INJECTION (ML) INTRAVENOUS ONCE
Status: DISCONTINUED | OUTPATIENT
Start: 2020-06-05 | End: 2020-06-07 | Stop reason: HOSPADM

## 2020-06-05 RX ORDER — CARBOPROST TROMETHAMINE 250 UG/ML
250 INJECTION, SOLUTION INTRAMUSCULAR AS NEEDED
Status: DISCONTINUED | OUTPATIENT
Start: 2020-06-05 | End: 2020-06-07 | Stop reason: HOSPADM

## 2020-06-05 RX ORDER — DOCUSATE SODIUM 100 MG/1
CAPSULE, LIQUID FILLED ORAL
Status: COMPLETED
Start: 2020-06-05 | End: 2020-06-05

## 2020-06-05 RX ORDER — OXYTOCIN/0.9 % SODIUM CHLORIDE 30/500 ML
85 PLASTIC BAG, INJECTION (ML) INTRAVENOUS ONCE
Status: DISCONTINUED | OUTPATIENT
Start: 2020-06-05 | End: 2020-06-07 | Stop reason: HOSPADM

## 2020-06-05 RX ORDER — MISOPROSTOL 200 UG/1
800 TABLET ORAL AS NEEDED
Status: DISCONTINUED | OUTPATIENT
Start: 2020-06-05 | End: 2020-06-07 | Stop reason: HOSPADM

## 2020-06-05 RX ADMIN — KETOROLAC TROMETHAMINE 30 MG: 30 INJECTION, SOLUTION INTRAMUSCULAR at 08:16

## 2020-06-05 RX ADMIN — DOCUSATE SODIUM 100 MG: 100 CAPSULE, LIQUID FILLED ORAL at 11:25

## 2020-06-05 RX ADMIN — OXYCODONE HYDROCHLORIDE AND ACETAMINOPHEN 2 TABLET: 5; 325 TABLET ORAL at 16:35

## 2020-06-05 RX ADMIN — FERROUS GLUCONATE TAB 324 MG (37.5 MG ELEMENTAL IRON) 324 MG: 324 (37.5 FE) TAB at 08:16

## 2020-06-05 RX ADMIN — OXYCODONE HYDROCHLORIDE AND ACETAMINOPHEN 2 TABLET: 5; 325 TABLET ORAL at 20:36

## 2020-06-05 RX ADMIN — IBUPROFEN 800 MG: 800 TABLET ORAL at 20:36

## 2020-06-05 RX ADMIN — PRENATAL VIT W/ FE FUMARATE-FA TAB 27-0.8 MG 1 TABLET: 27-0.8 TAB at 08:16

## 2020-06-05 RX ADMIN — HYDROCODONE BITARTRATE AND ACETAMINOPHEN 1 TABLET: 5; 325 TABLET ORAL at 06:22

## 2020-06-05 RX ADMIN — HUMAN RHO(D) IMMUNE GLOBULIN 1500 UNITS: 1500 SOLUTION INTRAMUSCULAR; INTRAVENOUS at 11:25

## 2020-06-05 RX ADMIN — OXYCODONE HYDROCHLORIDE AND ACETAMINOPHEN 2 TABLET: 5; 325 TABLET ORAL at 11:23

## 2020-06-05 NOTE — PROGRESS NOTES
Patient: Mary Sun  Procedure(s):   SECTION PRIMARY  Anesthesia type: epidural    Patient location: Labor and Delivery  Last vitals:   Vitals:    20 0726   BP: 120/64   Pulse: 109   Resp: 18   Temp: 98.8 °F (37.1 °C)   SpO2: 97%     Level of consciousness: awake, alert and oriented    Post-anesthesia pain: adequate analgesia  Airway patency: patent  Respiratory: unassisted  Cardiovascular: stable and blood pressure at baseline  Hydration: euvolemic    Anesthetic complications: no

## 2020-06-05 NOTE — PROGRESS NOTES
JORDIN Rice    Progress Note       Patient Name: Mary Sun  :  1995  MRN:  [id number]    Chief Complaint   Patient presents with   • Scheduled Induction             Subjective  Postpartum Day 1: primary LTCS    The patient feels well.  Her pain is well controlled with prescribed pain medications.   She is ambulating well.  Patient describes her bleeding as MMPPBLEEDING: red.    Breastfeeding: without difficulty.     ROS:   No SOA or chest pain   No HA or visual changes           /64 (BP Location: Left arm, Patient Position: Sitting)   Pulse 109   Temp 98.8 °F (37.1 °C) (Oral)   Resp 18   Wt 92.5 kg (204 lb)   LMP 2019   SpO2 97%   Breastfeeding Yes   BMI 32.93 kg/m²       Physical Exam:  General:  no acute distresss.  Abdomen: soft, NT, fundus firm and below umbilicus  Extremities: no calf tenderness  : YES: Yes  healing well, no drainage, no erythema, well approximated    Lab results reviewed:  Yes.   Results from last 7 days   Lab Units 20  0618   HEMOGLOBIN g/dL 9.0*               1) PPD#1: Progressing well. Hgb:9.0    2) Postpartum Care: routine    3) Dispo: estuardo Jacinto MD  2020  08:54

## 2020-06-06 PROCEDURE — 99024 POSTOP FOLLOW-UP VISIT: CPT | Performed by: OBSTETRICS & GYNECOLOGY

## 2020-06-06 RX ADMIN — OXYCODONE HYDROCHLORIDE AND ACETAMINOPHEN 2 TABLET: 5; 325 TABLET ORAL at 20:40

## 2020-06-06 RX ADMIN — IBUPROFEN 800 MG: 800 TABLET ORAL at 04:50

## 2020-06-06 RX ADMIN — OXYCODONE HYDROCHLORIDE AND ACETAMINOPHEN 2 TABLET: 5; 325 TABLET ORAL at 04:51

## 2020-06-06 RX ADMIN — OXYCODONE HYDROCHLORIDE AND ACETAMINOPHEN 2 TABLET: 5; 325 TABLET ORAL at 11:15

## 2020-06-06 RX ADMIN — FERROUS GLUCONATE TAB 324 MG (37.5 MG ELEMENTAL IRON) 324 MG: 324 (37.5 FE) TAB at 08:15

## 2020-06-06 RX ADMIN — IBUPROFEN 800 MG: 800 TABLET ORAL at 15:49

## 2020-06-06 RX ADMIN — PRENATAL VIT W/ FE FUMARATE-FA TAB 27-0.8 MG 1 TABLET: 27-0.8 TAB at 08:15

## 2020-06-06 RX ADMIN — DOCUSATE SODIUM 100 MG: 100 CAPSULE, LIQUID FILLED ORAL at 08:15

## 2020-06-06 NOTE — PLAN OF CARE
Problem: Patient Care Overview  Goal: Plan of Care Review  Flowsheets (Taken 6/6/2020 9947)  Progress: improving  Plan of Care Reviewed With: patient; mother  Outcome Summary: VSS. Pain controlled with PRN pain medications. Up ad charles. Struggling with breastfeeding. Little to no colostrum/milk production. Incision clean, dry, and intact. Uterus firm with light bleeding. Bonding well with infant.

## 2020-06-06 NOTE — PROGRESS NOTES
JORDIN Rice    Progress Note       Patient Name: Mary Sun  :  1995  MRN:  [id number]    Chief Complaint   Patient presents with   • Scheduled Induction             Subjective  Postpartum Day 2:     The patient feels well.  Her pain is well controlled with prescribed pain medications.   She is ambulating well.  Patient describes her bleeding as MMPPBLEEDING: red.    Breastfeeding: without difficulty.     ROS:   No SOA or chest pain   No HA or visual changes           /77 (BP Location: Left arm, Patient Position: Lying)   Pulse 88   Temp 98.2 °F (36.8 °C) (Oral)   Resp 20   Wt 92.5 kg (204 lb)   LMP 2019   SpO2 97%   Breastfeeding Yes   BMI 32.93 kg/m²       Physical Exam:  General:  no acute distresss.  Abdomen: soft, NT, fundus firm and below umbilicus  Extremities: no calf tenderness  : YES: Yes  healing well, no drainage, no erythema, well approximated    Lab results reviewed:  Yes.   Results from last 7 days   Lab Units 20  0618   HEMOGLOBIN g/dL 9.0*               1) PPD#2: Progressing well. Hgb:9.0    2) Postpartum Care: routine, patient working on breast-feeding with the staff.  Her milk has not come in yet.    3) Dispo: home tomorrow with staple removal next week          DK Jacinto MD  2020  09:39

## 2020-06-07 ENCOUNTER — DOCUMENTATION (OUTPATIENT)
Dept: LACTATION | Facility: HOSPITAL | Age: 25
End: 2020-06-07

## 2020-06-07 VITALS
DIASTOLIC BLOOD PRESSURE: 81 MMHG | HEART RATE: 84 BPM | SYSTOLIC BLOOD PRESSURE: 128 MMHG | RESPIRATION RATE: 16 BRPM | OXYGEN SATURATION: 99 % | WEIGHT: 204 LBS | TEMPERATURE: 97.4 F | BODY MASS INDEX: 32.93 KG/M2

## 2020-06-07 PROCEDURE — 99024 POSTOP FOLLOW-UP VISIT: CPT | Performed by: OBSTETRICS & GYNECOLOGY

## 2020-06-07 RX ORDER — OXYCODONE HYDROCHLORIDE AND ACETAMINOPHEN 5; 325 MG/1; MG/1
2 TABLET ORAL EVERY 4 HOURS PRN
Qty: 30 TABLET | Refills: 0 | Status: SHIPPED | OUTPATIENT
Start: 2020-06-07 | End: 2020-06-15

## 2020-06-07 RX ORDER — IBUPROFEN 800 MG/1
800 TABLET ORAL EVERY 8 HOURS PRN
Qty: 30 TABLET | Refills: 0 | Status: SHIPPED | OUTPATIENT
Start: 2020-06-07

## 2020-06-07 RX ORDER — PSEUDOEPHEDRINE HCL 30 MG
100 TABLET ORAL 2 TIMES DAILY
Qty: 30 EACH | Refills: 0 | Status: SHIPPED | OUTPATIENT
Start: 2020-06-07 | End: 2020-12-02

## 2020-06-07 RX ADMIN — PRENATAL VIT W/ FE FUMARATE-FA TAB 27-0.8 MG 1 TABLET: 27-0.8 TAB at 08:00

## 2020-06-07 RX ADMIN — IBUPROFEN 800 MG: 800 TABLET ORAL at 00:03

## 2020-06-07 RX ADMIN — OXYCODONE HYDROCHLORIDE AND ACETAMINOPHEN 2 TABLET: 5; 325 TABLET ORAL at 00:23

## 2020-06-07 RX ADMIN — OXYCODONE HYDROCHLORIDE AND ACETAMINOPHEN 2 TABLET: 5; 325 TABLET ORAL at 06:27

## 2020-06-07 RX ADMIN — FERROUS GLUCONATE TAB 324 MG (37.5 MG ELEMENTAL IRON) 324 MG: 324 (37.5 FE) TAB at 08:00

## 2020-06-07 RX ADMIN — OXYCODONE HYDROCHLORIDE AND ACETAMINOPHEN 2 TABLET: 5; 325 TABLET ORAL at 11:37

## 2020-06-07 RX ADMIN — IBUPROFEN 800 MG: 800 TABLET ORAL at 11:37

## 2020-06-07 NOTE — NURSING NOTE
Case Management Discharge Note      Final Note: d/c home                    Final Discharge Disposition Code: 01 - home or self-care

## 2020-06-07 NOTE — PLAN OF CARE
Problem: Patient Care Overview  Goal: Plan of Care Review  Flowsheets (Taken 6/7/2020 6413)  Progress: improving  Plan of Care Reviewed With: patient  Outcome Summary: VSS. Plans to DC today. Recovering well.

## 2020-06-07 NOTE — NURSING NOTE
AVS reviewed with patient. Pt. stated verbal and written understanding. All questions answered and no further concerns at this time. Reviewed expressing with patient and reminded of follow-up appointments. Pt. left via wheelchair in stable condition to go home with her mother and stepfather in personal vehicle.

## 2020-06-07 NOTE — DISCHARGE SUMMARY
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, Rh-, antepartum anemia, GBS carrier    Date of Delivery:  2020     Delivered By: Yasir Bernal     Delivery Type: primary  section, low transverse incision    Tubal Ligation: n/a    Baby: Liveborn female, Apgars 8/9, weight 7 #, 14 oz    Anesthesia: epidural    Intrapartum complications: Failure to Progress    Laceration: n/a      Feeding method: breast      Discharge Date: 2020; Discharge Time: 09:    /81 (BP Location: Right arm, Patient Position: Lying)   Pulse 84   Temp 97.4 °F (36.3 °C) (Oral)   Resp 16   Wt 92.5 kg (204 lb)   LMP 2019   SpO2 99%   Breastfeeding Yes   BMI 32.93 kg/m²      Abd: Soft, fundus firm, nontender.  Incision clean dry and intact.  No erythema or induration.  Staples intact.  Ext: No cords  Results from last 7 days   Lab Units 20  0618   HEMOGLOBIN g/dL 9.0*       IMP/DDX: Postop day 3 status post primary  for failure to progress    Rh- status post RhoGam    Antepartum anemia, discharge hemoglobin 9.0      Plan:    Patient given written instruction sheet.  Follow-up appointment with Dr Jacinto in 5 days.    Vern Jacinto MD  09:22  2020

## 2020-06-07 NOTE — PROGRESS NOTES
Received message from JORDIN Rice about pt having difficulty with breastfeeding and hand expression. Spoke with pt on the phone this morning and gave tips on hand expression, gave link to youtube video for demonstration, discussed educational videos on her tv. Made an appt in [facility] for tomorrow at 1 pm to help with latch. Encouraged mom to continue hand expressing and latching baby q2-3h and to give baby EBM.

## 2020-06-07 NOTE — PLAN OF CARE
Problem: Patient Care Overview  Goal: Plan of Care Review  Outcome: Ongoing (interventions implemented as appropriate)  Flowsheets  Taken 6/7/2020 0536 by Valeria Young RN  Progress: improving  Taken 6/6/2020 8033 by Cris Billy RN  Plan of Care Reviewed With: patient;mother  Outcome Summary: VSS. Pain controlled with PRN pain medications. Up ad charles. Struggling with breastfeeding. Little to no colostrum/milk production. Incision clean, dry, and intact. Uterus firm with light bleeding. Bonding well with infant.

## 2020-06-08 ENCOUNTER — TELEPHONE (OUTPATIENT)
Dept: OBSTETRICS AND GYNECOLOGY | Facility: CLINIC | Age: 25
End: 2020-06-08

## 2020-06-11 ENCOUNTER — POSTPARTUM VISIT (OUTPATIENT)
Dept: OBSTETRICS AND GYNECOLOGY | Facility: CLINIC | Age: 25
End: 2020-06-11

## 2020-06-11 VITALS
WEIGHT: 196 LBS | DIASTOLIC BLOOD PRESSURE: 98 MMHG | BODY MASS INDEX: 30.76 KG/M2 | SYSTOLIC BLOOD PRESSURE: 138 MMHG | HEIGHT: 67 IN

## 2020-06-11 LAB
ALBUMIN SERPL-MCNC: 3.8 G/DL (ref 3.5–5.2)
ALBUMIN/GLOB SERPL: 1.2 G/DL
ALP SERPL-CCNC: 111 U/L (ref 39–117)
ALT SERPL-CCNC: 58 U/L (ref 1–33)
AST SERPL-CCNC: 74 U/L (ref 1–32)
BASOPHILS # BLD AUTO: 0.02 10*3/MM3 (ref 0–0.2)
BASOPHILS NFR BLD AUTO: 0.4 % (ref 0–1.5)
BILIRUB SERPL-MCNC: 0.3 MG/DL (ref 0.2–1.2)
BUN SERPL-MCNC: 12 MG/DL (ref 6–20)
BUN/CREAT SERPL: 14.6 (ref 7–25)
CALCIUM SERPL-MCNC: 9.1 MG/DL (ref 8.6–10.5)
CHLORIDE SERPL-SCNC: 102 MMOL/L (ref 98–107)
CO2 SERPL-SCNC: 25.6 MMOL/L (ref 22–29)
CREAT SERPL-MCNC: 0.82 MG/DL (ref 0.57–1)
EOSINOPHIL # BLD AUTO: 0.23 10*3/MM3 (ref 0–0.4)
EOSINOPHIL NFR BLD AUTO: 4.2 % (ref 0.3–6.2)
ERYTHROCYTE [DISTWIDTH] IN BLOOD BY AUTOMATED COUNT: 12.5 % (ref 12.3–15.4)
GLOBULIN SER CALC-MCNC: 3.1 GM/DL
GLUCOSE SERPL-MCNC: 78 MG/DL (ref 65–99)
HCT VFR BLD AUTO: 27.5 % (ref 34–46.6)
HGB BLD-MCNC: 9.7 G/DL (ref 12–15.9)
IMM GRANULOCYTES # BLD AUTO: 0.03 10*3/MM3 (ref 0–0.05)
IMM GRANULOCYTES NFR BLD AUTO: 0.5 % (ref 0–0.5)
LYMPHOCYTES # BLD AUTO: 1.89 10*3/MM3 (ref 0.7–3.1)
LYMPHOCYTES NFR BLD AUTO: 34.1 % (ref 19.6–45.3)
MCH RBC QN AUTO: 33 PG (ref 26.6–33)
MCHC RBC AUTO-ENTMCNC: 35.3 G/DL (ref 31.5–35.7)
MCV RBC AUTO: 93.5 FL (ref 79–97)
MONOCYTES # BLD AUTO: 0.6 10*3/MM3 (ref 0.1–0.9)
MONOCYTES NFR BLD AUTO: 10.8 % (ref 5–12)
NEUTROPHILS # BLD AUTO: 2.77 10*3/MM3 (ref 1.7–7)
NEUTROPHILS NFR BLD AUTO: 50 % (ref 42.7–76)
NRBC BLD AUTO-RTO: 0 /100 WBC (ref 0–0.2)
PLATELET # BLD AUTO: 280 10*3/MM3 (ref 140–450)
POTASSIUM SERPL-SCNC: 4.6 MMOL/L (ref 3.5–5.2)
PROT SERPL-MCNC: 6.9 G/DL (ref 6–8.5)
RBC # BLD AUTO: 2.94 10*6/MM3 (ref 3.77–5.28)
SODIUM SERPL-SCNC: 138 MMOL/L (ref 136–145)
WBC # BLD AUTO: 5.54 10*3/MM3 (ref 3.4–10.8)

## 2020-06-11 PROCEDURE — 0503F POSTPARTUM CARE VISIT: CPT | Performed by: OBSTETRICS & GYNECOLOGY

## 2020-06-11 RX ORDER — MEDROXYPROGESTERONE ACETATE 150 MG/ML
150 INJECTION, SUSPENSION INTRAMUSCULAR
Qty: 1 ML | Refills: 3 | Status: SHIPPED | OUTPATIENT
Start: 2020-06-11 | End: 2020-10-28

## 2020-06-11 RX ORDER — LABETALOL 100 MG/1
100 TABLET, FILM COATED ORAL 2 TIMES DAILY
Qty: 60 TABLET | Refills: 6 | Status: SHIPPED | OUTPATIENT
Start: 2020-06-11 | End: 2020-12-02

## 2020-06-11 NOTE — PROGRESS NOTES
"      Mary Sun is a 24 y.o. patient who presents for follow up of   Chief Complaint   Patient presents with   • Postpartum Care       HPI 24-year-old  1 para 1 7 days status post primary .  She presents today for staple removal.  She is breast-feeding without difficulty.  Her bleeding is drying up.  She complains of excessive lower extremity swelling.  She denies any headache or visual changes.  She denies any fevers.  She is interested in Depo-Provera for birth control.    The following portions of the patient's history were reviewed and updated as appropriate: allergies, current medications and problem list.    Review of Systems   Constitutional: Negative for appetite change, fever and unexpected weight change.   HENT: Negative for congestion and sore throat.    Respiratory: Negative for cough and shortness of breath.    Cardiovascular: Negative for chest pain and palpitations.   Gastrointestinal: Negative for abdominal distention, abdominal pain, constipation, diarrhea, nausea and vomiting.   Endocrine: Negative.    Genitourinary: Negative for dyspareunia, menstrual problem, pelvic pain and vaginal discharge.   Musculoskeletal: Positive for joint swelling.   Skin: Negative.    Neurological: Negative for dizziness and syncope.   Hematological: Negative.    Psychiatric/Behavioral: Negative for dysphoric mood and sleep disturbance. The patient is not nervous/anxious.        /98   Ht 170.2 cm (67\")   Wt 88.9 kg (196 lb)   Breastfeeding Yes   BMI 30.70 kg/m²     Physical Exam   Constitutional: She is oriented to person, place, and time. She appears well-developed and well-nourished.   HENT:   Head: Normocephalic and atraumatic.   Pulmonary/Chest: Effort normal.   Abdominal: Soft. Bowel sounds are normal. She exhibits no distension and no mass. There is no tenderness. There is no rebound and no guarding.   C/S incision well healed   Genitourinary: Vagina normal and uterus normal. "   Musculoskeletal: Normal range of motion. She exhibits edema (2-3 + LE edema).   Neurological: She is alert and oriented to person, place, and time.   Skin: Skin is warm and dry.   Psychiatric: She has a normal mood and affect. Her behavior is normal. Judgment and thought content normal.   Nursing note and vitals reviewed.  Staples removed without difficulty.  Incision looks great.      Assessment/Plan    Mary was seen today for postpartum care.    Diagnoses and all orders for this visit:    Postpartum care and examination    Preeclampsia in postpartum period  -     CBC & Differential  -     Comprehensive Metabolic Panel    Other orders  -     labetalol (NORMODYNE) 100 MG tablet; Take 1 tablet by mouth 2 (Two) Times a Day.  -     medroxyPROGESTERone (Depo-Provera) 150 MG/ML injection; Inject 1 mL into the appropriate muscle as directed by prescriber Every 3 (Three) Months.    Blood pressures noted.  Check CBC and CMP.  Start 24-hour urine.  Labetalol 100 mg p.o. twice daily.  Repeat blood pressure on Monday.  Preeclampsia and seizure warnings given.  Depo-Provera called in.    Return in about 4 days (around 6/15/2020) for Postpartum.      Vern Jacinto MD  6/11/2020  16:15

## 2020-06-12 ENCOUNTER — TELEPHONE (OUTPATIENT)
Dept: OBSTETRICS AND GYNECOLOGY | Facility: HOSPITAL | Age: 25
End: 2020-06-12

## 2020-06-12 NOTE — TELEPHONE ENCOUNTER
Breast Feeding Follow Phone Call PP Week : 1    Date of Birth: [birth year] Baby's Name:    Leena     did not answer.

## 2020-06-12 NOTE — TELEPHONE ENCOUNTER
Breast Feeding Follow Phone Call PP Week : 1    Date of Birth: [birth year]  Baby's Name:     Leenachas Sun is BF and pumping. Since dc from hospital she has seen the [facility] at [place]. Her milk has come in and she is successfully BF. She plans to see [facility] again next Monday. She denies any questions or needs at this time.

## 2020-06-15 ENCOUNTER — POSTPARTUM VISIT (OUTPATIENT)
Dept: OBSTETRICS AND GYNECOLOGY | Facility: CLINIC | Age: 25
End: 2020-06-15

## 2020-06-15 ENCOUNTER — APPOINTMENT (OUTPATIENT)
Dept: LACTATION | Facility: HOSPITAL | Age: 25
End: 2020-06-15

## 2020-06-15 VITALS
HEIGHT: 67 IN | DIASTOLIC BLOOD PRESSURE: 84 MMHG | SYSTOLIC BLOOD PRESSURE: 138 MMHG | BODY MASS INDEX: 29.03 KG/M2 | WEIGHT: 185 LBS

## 2020-06-15 PROCEDURE — 0503F POSTPARTUM CARE VISIT: CPT | Performed by: OBSTETRICS & GYNECOLOGY

## 2020-06-15 RX ORDER — TRIAMCINOLONE ACETONIDE 1 MG/G
CREAM TOPICAL
COMMUNITY
Start: 2020-06-12

## 2020-06-15 NOTE — PROGRESS NOTES
"POSTOP CHECK.    S:  Pt here for recheck for bp and remittance of 24 hr urine.  Pt feels ok today.  Breast feeding.     O:  /84   Ht 170.2 cm (67\")   Wt 83.9 kg (185 lb)   Breastfeeding Yes   BMI 28.98 kg/m²     Exam:  Inc:  Ok    Ext: 1+ edema.    Lochia scant.    A: doing well postop. Doubt postpartum preeclampsia.    P:  Continue current management.  Call if any problems.     Yasir Bernal MD  16:49  06/15/20      "

## 2020-06-16 LAB
PROT 24H UR-MRATE: 192 MG/24 HR (ref 30–150)
PROT UR-MCNC: 21.3 MG/DL

## 2020-06-18 ENCOUNTER — HOSPITAL ENCOUNTER (OUTPATIENT)
Dept: LACTATION | Facility: HOSPITAL | Age: 25
Discharge: HOME | End: 2020-06-18

## 2020-06-18 NOTE — LACTATION NOTE
P1. Baby Leena is latching without the nipple shield and needed no assistance with the latch. Mom's breasts are full and baby started swallowing immediately and frequently through out the feeding that lasted 12 minutes on the right breast. She was drowsy and content when she released the breast. Nipple was everted and rounded. Mom denies discomfort.  Baby was able to obtain 60cc in 12 minutes and retained the feeding.   Birth weight 7#14 oz  Today at 2 weeks , 8#10 oz.   60cc from right breast in 12 minutes.   Lactation Consult Note    Evaluation Completed: 2020 13:46  Patient Name: Mary Sun  :  1995  MRN:  [id number]     REFERRAL  INFORMATION:                          Date of Referral: 20   Person Making Referral: patient  Maternal Reason for Referral: breastfeeding currently, other (see comments)(weaned from nipple shield)       DELIVERY HISTORY:  This patient has no babies on file.  This patient has no babies on file.  Skin to skin initiation date/time: This patient has no babies on file. This patient has no babies on file.  Skin to skin end date/time: This patient has no babies on file. This patient has no babies on file.  This patient has no babies on file.    MATERNAL ASSESSMENT:  Breast Size Issue: none (20 1300 : Valerie Dumont RN)  Breast Shape: pendulous, round (20 1300 : Valerie Dumont RN)  Breast Density: full (20 1300 : Valerie Dumont RN)  Areola: elastic (20 1300 : Valerie Dumont RN)  Nipples: graspable (20 1300 : Valerie Dumont RN)                INFANT ASSESSMENT:  This patient has no babies on file.  This patient has no babies on file.  This patient has no babies on file.  This patient has no babies on file.  This patient has no babies on file.  This patient has no babies on file.  This patient has no babies on file.  This patient has no babies on file.  This patient has no babies on file.  This patient has no  babies on file.  This patient has no babies on file.  This patient has no babies on file.  This patient has no babies on file.  This patient has no babies on file.  This patient has no babies on file.  This patient has no babies on file.  This patient has no babies on file.  This patient has no babies on file.  This patient has no babies on file.  This patient has no babies on file.      This patient has no babies on file.  This patient has no babies on file.  This patient has no babies on file.  This patient has no babies on file.  This patient has no babies on file.  This patient has no babies on file.    This patient has no babies on file.  This patient has no babies on file.  This patient has no babies on file.        MATERNAL INFANT FEEDING:  Maternal Preparation: breast care, hand hygiene (06/18/20 1300 : Valerie Dumont RN)  Maternal Emotional State: assist needed (06/18/20 1300 : Valerie Dumont RN)  Infant Positioning: clutch/football (06/18/20 1300 : Valerie Dumont RN)   Signs of Milk Transfer: audible swallow, infant jaw motion present, suck/swallow ratio (06/18/20 1300 : Valerie Dumont RN)  Pain with Feeding: no (06/18/20 1300 : Vlaerie Dumont RN)           Milk Ejection Reflex: present (06/18/20 1300 : Valerie Dumont RN)           Latch Assistance: yes (06/18/20 1300 : Valerie Dumont RN)                               EQUIPMENT TYPE:  Breast Pump Type: double electric, personal (06/18/20 1300 : Valerie Dumont RN)  Breast Pump Flange Type: hard (06/18/20 1300 : Valerie Dumont, RN)                           BREAST PUMPING:          LACTATION REFERRALS:

## 2020-06-19 ENCOUNTER — TELEPHONE (OUTPATIENT)
Dept: OBSTETRICS AND GYNECOLOGY | Facility: HOSPITAL | Age: 25
End: 2020-06-19

## 2020-06-19 NOTE — TELEPHONE ENCOUNTER
Breast Feeding Follow Phone Call PP Week : 2     Date of Birth: [birth year] Baby's Name:     Leena Sun is seeing [facility] at [facility]. She states they have been incredible helpful. She denies any questions or concerns today.

## 2020-07-13 ENCOUNTER — POSTPARTUM VISIT (OUTPATIENT)
Dept: OBSTETRICS AND GYNECOLOGY | Facility: CLINIC | Age: 25
End: 2020-07-13

## 2020-07-13 VITALS
SYSTOLIC BLOOD PRESSURE: 122 MMHG | BODY MASS INDEX: 30.13 KG/M2 | HEIGHT: 67 IN | WEIGHT: 192 LBS | DIASTOLIC BLOOD PRESSURE: 68 MMHG

## 2020-07-13 DIAGNOSIS — Z30.013 ENCOUNTER FOR INITIAL PRESCRIPTION OF INJECTABLE CONTRACEPTIVE: Primary | ICD-10-CM

## 2020-07-13 DIAGNOSIS — Z01.419 PAP SMEAR, LOW-RISK: ICD-10-CM

## 2020-07-13 DIAGNOSIS — Z11.51 SCREENING FOR HPV (HUMAN PAPILLOMAVIRUS): ICD-10-CM

## 2020-07-13 PROCEDURE — 96372 THER/PROPH/DIAG INJ SC/IM: CPT | Performed by: OBSTETRICS & GYNECOLOGY

## 2020-07-13 PROCEDURE — 0503F POSTPARTUM CARE VISIT: CPT | Performed by: OBSTETRICS & GYNECOLOGY

## 2020-07-13 RX ORDER — MEDROXYPROGESTERONE ACETATE 150 MG/ML
150 INJECTION, SUSPENSION INTRAMUSCULAR ONCE
Status: COMPLETED | OUTPATIENT
Start: 2020-07-13 | End: 2020-07-13

## 2020-07-13 RX ORDER — DOXYCYCLINE HYCLATE 50 MG/1
324 CAPSULE, GELATIN COATED ORAL
COMMUNITY

## 2020-07-13 RX ADMIN — MEDROXYPROGESTERONE ACETATE 150 MG: 150 INJECTION, SUSPENSION INTRAMUSCULAR at 17:23

## 2020-07-13 NOTE — PROGRESS NOTES
"POSTPARTUM 6 WK CHECK    S:  Doing well, no complaints.  Breastfeeding well.  Denies pp depression.    O:  /68   Ht 170.2 cm (67\")   Wt 87.1 kg (192 lb)   Breastfeeding Yes   BMI 30.07 kg/m²     Inc ok.    Pelvic neg.  Pap done    A:  Doing well.  Depo given IM today.    P:  rto 1 yr or for depo.    Yasir Bernal MD  17:14  07/13/20      "

## 2020-07-15 LAB
C TRACH RRNA CVX QL NAA+PROBE: NEGATIVE
CONV .: NORMAL
CYTOLOGIST CVX/VAG CYTO: NORMAL
CYTOLOGY CVX/VAG DOC CYTO: NORMAL
CYTOLOGY CVX/VAG DOC THIN PREP: NORMAL
DX ICD CODE: NORMAL
HIV 1 & 2 AB SER-IMP: NORMAL
N GONORRHOEA RRNA CVX QL NAA+PROBE: NEGATIVE
OTHER STN SPEC: NORMAL
STAT OF ADQ CVX/VAG CYTO-IMP: NORMAL
T VAGINALIS RRNA SPEC QL NAA+PROBE: NEGATIVE

## 2020-07-17 ENCOUNTER — TELEPHONE (OUTPATIENT)
Dept: OBSTETRICS AND GYNECOLOGY | Facility: HOSPITAL | Age: 25
End: 2020-07-17

## 2020-07-17 NOTE — TELEPHONE ENCOUNTER
Breast Feeding Follow Phone Call PP Week : 6    Date of Birth: [birth year]  Baby's Name:   Leena    No answer

## 2020-07-24 ENCOUNTER — TELEPHONE (OUTPATIENT)
Dept: OBSTETRICS AND GYNECOLOGY | Facility: CLINIC | Age: 25
End: 2020-07-24

## 2020-10-28 ENCOUNTER — CLINICAL SUPPORT (OUTPATIENT)
Dept: OBSTETRICS AND GYNECOLOGY | Facility: CLINIC | Age: 25
End: 2020-10-28

## 2020-10-28 VITALS — WEIGHT: 184 LBS | BODY MASS INDEX: 28.82 KG/M2

## 2020-10-28 DIAGNOSIS — Z30.9 ENCOUNTER FOR CONTRACEPTIVE MANAGEMENT, UNSPECIFIED TYPE: Primary | ICD-10-CM

## 2020-10-28 DIAGNOSIS — Z30.013 ENCOUNTER FOR INITIAL PRESCRIPTION OF INJECTABLE CONTRACEPTIVE: ICD-10-CM

## 2020-10-28 LAB
B-HCG UR QL: NEGATIVE
INTERNAL NEGATIVE CONTROL: NEGATIVE
INTERNAL POSITIVE CONTROL: POSITIVE
Lab: NORMAL

## 2020-10-28 PROCEDURE — 96372 THER/PROPH/DIAG INJ SC/IM: CPT | Performed by: OBSTETRICS & GYNECOLOGY

## 2020-10-28 PROCEDURE — 81025 URINE PREGNANCY TEST: CPT | Performed by: OBSTETRICS & GYNECOLOGY

## 2020-10-28 RX ORDER — MEDROXYPROGESTERONE ACETATE 150 MG/ML
150 INJECTION, SUSPENSION INTRAMUSCULAR ONCE
Status: COMPLETED | OUTPATIENT
Start: 2020-10-28 | End: 2020-10-28

## 2020-10-28 RX ADMIN — MEDROXYPROGESTERONE ACETATE 150 MG: 150 INJECTION, SUSPENSION INTRAMUSCULAR at 11:56

## 2020-12-02 ENCOUNTER — OFFICE VISIT (OUTPATIENT)
Dept: OBSTETRICS AND GYNECOLOGY | Facility: CLINIC | Age: 25
End: 2020-12-02

## 2020-12-02 VITALS
DIASTOLIC BLOOD PRESSURE: 72 MMHG | SYSTOLIC BLOOD PRESSURE: 124 MMHG | WEIGHT: 176 LBS | BODY MASS INDEX: 27.62 KG/M2 | HEIGHT: 67 IN

## 2020-12-02 DIAGNOSIS — N63.10 BREAST MASS, RIGHT: Primary | ICD-10-CM

## 2020-12-02 PROCEDURE — 99213 OFFICE O/P EST LOW 20 MIN: CPT | Performed by: OBSTETRICS & GYNECOLOGY

## 2020-12-02 RX ORDER — KETOCONAZOLE 20 MG/ML
SHAMPOO TOPICAL
COMMUNITY
Start: 2020-09-09

## 2020-12-02 RX ORDER — CLOBETASOL PROPIONATE 0.46 MG/ML
SOLUTION TOPICAL
COMMUNITY
Start: 2020-09-08

## 2020-12-02 NOTE — PROGRESS NOTES
"      Mary Sun is a 24 y.o. patient who presents for follow up of   Chief Complaint   Patient presents with   • Breast Mass     right breast lump.     25 yo est pt here for emergency appt for breast mass. She had a C/S in 6/2020 and stopped breastfeeding several months ago. She had a small lump on her R areola for the past several months that has become enlarged, painful and tender in the last week. It is not warm or leaking.         The following portions of the patient's history were reviewed and updated as appropriate: allergies, current medications and problem list.    Review of Systems   Constitutional: Negative for activity change, fatigue and unexpected weight change.   Genitourinary: Negative for pelvic pain and vaginal bleeding.   Skin: Positive for wound.   All other systems reviewed and are negative.      /72   Ht 170.2 cm (67\")   Wt 79.8 kg (176 lb)   Breastfeeding No   BMI 27.57 kg/m²     Physical Exam  Vitals signs and nursing note reviewed. Exam conducted with a chaperone present.   Constitutional:       Appearance: Normal appearance. She is normal weight.   HENT:      Head: Normocephalic and atraumatic.   Eyes:      General: No scleral icterus.     Conjunctiva/sclera: Conjunctivae normal.   Chest:      Breasts:         Right: Mass and tenderness present. No swelling, bleeding, inverted nipple, nipple discharge or skin change.         Left: No swelling, bleeding, inverted nipple, mass, nipple discharge, skin change or tenderness.       Neurological:      Mental Status: She is alert and oriented to person, place, and time.   Psychiatric:         Mood and Affect: Mood normal.         Behavior: Behavior normal.         Thought Content: Thought content normal.         Judgment: Judgment normal.         A/P:  1. R areolar mass- enc warm compresses. Area needs to be excised or opened. Dr Dobbins has agreed to see the patient tomorrow for evaluation in her [facility] office.     Assessment/Plan "   Diagnoses and all orders for this visit:    1. Breast mass, right (Primary)                 No follow-ups on file.      Jesi Lopez MD    12/2/2020  20:08 EST

## 2020-12-03 ENCOUNTER — OFFICE VISIT (OUTPATIENT)
Dept: SURGERY | Facility: CLINIC | Age: 25
End: 2020-12-03

## 2020-12-03 VITALS
BODY MASS INDEX: 27.5 KG/M2 | SYSTOLIC BLOOD PRESSURE: 122 MMHG | HEART RATE: 89 BPM | DIASTOLIC BLOOD PRESSURE: 72 MMHG | WEIGHT: 175.2 LBS | OXYGEN SATURATION: 99 % | HEIGHT: 67 IN

## 2020-12-03 DIAGNOSIS — N61.1 ABSCESS OF RIGHT BREAST: Primary | ICD-10-CM

## 2020-12-03 PROCEDURE — 99203 OFFICE O/P NEW LOW 30 MIN: CPT | Performed by: SURGERY

## 2020-12-03 RX ORDER — CLINDAMYCIN HYDROCHLORIDE 300 MG/1
300 CAPSULE ORAL 3 TIMES DAILY
Qty: 30 CAPSULE | Refills: 0 | Status: SHIPPED | OUTPATIENT
Start: 2020-12-03

## 2020-12-03 NOTE — PROGRESS NOTES
Chief Complaint   Patient presents with   • Mass     right breast left side of nipple        Patient is a 24 y.o. female referred by Dr. Lopez for evaluation of right breast abscess.  Patient stopped nursing several weeks ago.  However, over the last 3 days she developed a nodule off to the left side of her nipple that is very painful and increasing in size.  Patient denies fever, chills, nausea or vomiting.  Patient has never had anything like this in the past.  Patient is not currently on any antibiotics.  Patient denies any nipple trauma or piercings.    Past Medical History:   Diagnosis Date   • Anemia    • Eczema of both hands    • Herpes      Past Surgical History:   Procedure Laterality Date   •  SECTION N/A 2020    Procedure:  SECTION PRIMARY;  Surgeon: Yasir Bernal MD;  Location: [facility] LABOR DELIVERY;  Service: Obstetrics/Gynecology;  Laterality: N/A;     Family History   Problem Relation Age of Onset   • Cancer Mother    • Hypertension Mother    • Hypertension Father    • No Known Problems Sister    • Birth defects Paternal Grandfather    • No Known Problems Maternal Grandmother    • No Known Problems Maternal Grandfather    • No Known Problems Paternal Grandmother      Social History     Tobacco Use   • Smoking status: Former Smoker   • Smokeless tobacco: Never Used   • Tobacco comment: occasional use    Substance Use Topics   • Alcohol use: No     Frequency: Never     Comment: occasional   • Drug use: No     Allergies   Allergen Reactions   • Nuts Anaphylaxis and Hives     Tree nuts   • Amoxil [Amoxicillin] Hives   • Flagyl [Metronidazole] Hives   • Penicillins Hives       Current Outpatient Medications:   •  betamethasone valerate (VALISONE) 0.1 % ointment, , Disp: , Rfl:   •  clobetasol (TEMOVATE) 0.05 % external solution, , Disp: , Rfl:   •  ferrous gluconate (FERGON) 324 MG tablet, Take 324 mg by mouth Daily With Breakfast., Disp: , Rfl:   •  hydrocortisone  "2.5 % cream, , Disp: , Rfl:   •  ibuprofen (ADVIL,MOTRIN) 800 MG tablet, Take 1 tablet by mouth Every 8 (Eight) Hours As Needed for Mild Pain ., Disp: 30 tablet, Rfl: 0  •  ketoconazole (NIZORAL) 2 % shampoo, , Disp: , Rfl:   •  triamcinolone (KENALOG) 0.1 % cream, , Disp: , Rfl:     Review of Systems   Constitutional: Negative for activity change, chills, fever and unexpected weight change.   HENT: Negative for congestion.    Eyes: Negative for visual disturbance.   Respiratory: Negative for shortness of breath.    Cardiovascular: Negative for chest pain and palpitations.   Gastrointestinal: Negative for abdominal pain and blood in stool.   Endocrine: Negative for cold intolerance and heat intolerance.   Genitourinary: Negative for hematuria.   Musculoskeletal: Negative for gait problem.   Skin: Negative for color change.   Allergic/Immunologic: Negative for immunocompromised state.   Neurological: Negative for weakness and light-headedness.   Hematological: Negative for adenopathy.   Psychiatric/Behavioral: Negative for sleep disturbance. The patient is not nervous/anxious.           Vitals:    12/03/20 1518   BP: 122/72   BP Location: Left arm   Patient Position: Sitting   Cuff Size: Adult   Pulse: 89   SpO2: 99%   Weight: 79.5 kg (175 lb 3.2 oz)   Height: 170.2 cm (67\")       Physical Exam  General/physcological:   Alert and oriented x3, in no acute distress  HEENT: Normal cephalic, atraumatic, PERRLA, EOMI, sclera anicteric, moist mucous membranes, neck is supple, no JVD, no carotid bruits, no thyromegaly no adenopathy  Breast: Breast examination is performed in the sitting position which reveals an obvious protrusion off to the left side of the right areola with induration and is tender.  Musculoskeletal: Moves all 4 ext, no clubbing, no cyanosis or edema  Neurovascular: Grossly intact  Debilities: none  Emotional behavior: appropriate     Patient does not use tobacco products currently.     Assessment:  Right " breast periareolar abscess  Plan:  I will place the patient on clindamycin.  I have advised the patient that we should do an incision and drainage.  However, patient wants to see if the antibiotics will work first.    Victoria Pompa MD  General, Minimally Invasive and Endoscopic Surgery  [facility]      [address] [address]    [address]  [address] [address]    P: [phone]  F: [phone]    Cc:  Morro Laura MD

## 2020-12-08 ENCOUNTER — OFFICE VISIT (OUTPATIENT)
Dept: SURGERY | Facility: CLINIC | Age: 25
End: 2020-12-08

## 2020-12-08 VITALS
HEIGHT: 67 IN | DIASTOLIC BLOOD PRESSURE: 80 MMHG | OXYGEN SATURATION: 99 % | BODY MASS INDEX: 27.23 KG/M2 | WEIGHT: 173.5 LBS | HEART RATE: 114 BPM | SYSTOLIC BLOOD PRESSURE: 120 MMHG

## 2020-12-08 DIAGNOSIS — N61.1 ABSCESS OF RIGHT BREAST: Primary | ICD-10-CM

## 2020-12-08 PROCEDURE — 19020 MASTOTOMY EXPL DRG ABSC DP: CPT | Performed by: SURGERY

## 2020-12-08 PROCEDURE — 99212 OFFICE O/P EST SF 10 MIN: CPT | Performed by: SURGERY

## 2020-12-08 NOTE — PROGRESS NOTES
Procedure   Procedures   After the patient was consented and positioned in the supine position, the right nipple was prepped with alcohol and anesthetized with 1% lidocaine.  Using an 11 blade scalpel, the fluctuant area was opened with purulent drainage.  The cavity was fully expressed.  A sterile bandage was applied.  The patient was instructed to complete her antibiotics.

## 2020-12-08 NOTE — PROGRESS NOTES
"Chief complaint:    Chief Complaint   Patient presents with   • Mass     right breast, bigger & drainging       History of Present Illness    This is Mary Sun 24 y.o. was seen in the office last week and placed on clindamycin for a left breast abscess that was small at that time.  Patient reports that had triple ligament supplies and drained a little bit of pus yesterday in the shower through a pinhole opening.  Patient denies fever, chills, nausea or vomiting.  Patient reports it is more painful than previous.    The following portions of the patient's history were reviewed and updated as appropriate: allergies, current medications, past family history, past medical history, past social history, past surgical history and problem list.    Vitals:    12/08/20 1014   BP: 120/80   BP Location: Right arm   Patient Position: Sitting   Cuff Size: Adult   Pulse: 114   SpO2: 99%   Weight: 78.7 kg (173 lb 8 oz)   Height: 170.2 cm (67\")       Physical Exam  Gen.: Patient is alert and oriented ×3, no acute distress  HEENT: Normal cephalic, atraumatic, moist mucous membranes, anicteric  Right breast abscess increased in size, unable to express any fluid from tiny pinhole, erythema and fluctuance tender    Assessment/plan:  Right breast abscess with cellulitis  I have advised patient to complete her clindamycin and undergo incision and drainage in the office.    Victoria Pompa MD  General, Minimally Invasive and Endoscopic Surgery  [facility]    [address] [facility] [address] [facility] [address]    [address]  [address] [address]    P: [phone]  F: [phone]    Cc:  Morro Laura MD  "

## 2020-12-17 RX ORDER — SULFAMETHOXAZOLE AND TRIMETHOPRIM 800; 160 MG/1; MG/1
1 TABLET ORAL 2 TIMES DAILY
Qty: 28 TABLET | Refills: 0 | Status: SHIPPED | OUTPATIENT
Start: 2020-12-17 | End: 2020-12-31

## 2020-12-21 ENCOUNTER — OFFICE VISIT (OUTPATIENT)
Dept: OBSTETRICS AND GYNECOLOGY | Facility: CLINIC | Age: 25
End: 2020-12-21

## 2020-12-21 VITALS
BODY MASS INDEX: 27.37 KG/M2 | DIASTOLIC BLOOD PRESSURE: 64 MMHG | WEIGHT: 174.4 LBS | HEIGHT: 67 IN | SYSTOLIC BLOOD PRESSURE: 104 MMHG

## 2020-12-21 DIAGNOSIS — N64.4 PAIN OF RIGHT BREAST: ICD-10-CM

## 2020-12-21 DIAGNOSIS — N61.1 LEFT BREAST ABSCESS: ICD-10-CM

## 2020-12-21 PROCEDURE — 99214 OFFICE O/P EST MOD 30 MIN: CPT | Performed by: OBSTETRICS & GYNECOLOGY

## 2020-12-21 NOTE — PROGRESS NOTES
EVALUATION AND MANAGEMENT ENCOUNTER    Mary Sun  Patient new to examiner? No  New problem to examiner? No  Patient referred? No    -----------------------------------------------------HISTORY---------------------------------------------------    Chief Complaint:   Chief Complaint   Patient presents with   • Breast Problem     left breast pain and tenderness       HPI:  Mary Sun is a 25 y.o.  with No LMP recorded. Patient has had an injection. here for possible new L breast abscess.    1. Location: left breast      2. Severity:  moderate      3.  Quality:  na      4. Modifying factors:  Hurts with touching      5.  Associated signs/symptoms:  none      Pt denies:  Nipple discharge, fever or redness    History of Present Illness    PFSH:   1.    Past Medical History:   Diagnosis Date   • Anemia    • Eczema of both hands    • Herpes      2.   Family History   Problem Relation Age of Onset   • Cancer Mother    • Hypertension Mother    • Hypertension Father    • No Known Problems Sister    • Birth defects Paternal Grandfather    • No Known Problems Maternal Grandmother    • No Known Problems Maternal Grandfather    • No Known Problems Paternal Grandmother      3.  Smoker: No, Alcohol: No, Recreational drugs: No    PAST HISTORY REVIEWED:  1.   Past Surgical History:   Procedure Laterality Date   •  SECTION N/A 2020    Procedure:  SECTION PRIMARY;  Surgeon: Yasir Bernal MD;  Location: [facility] LABOR DELIVERY;  Service: Obstetrics/Gynecology;  Laterality: N/A;      2.   Current Outpatient Medications:   •  betamethasone valerate (VALISONE) 0.1 % ointment, , Disp: , Rfl:   •  clindamycin (Cleocin) 300 MG capsule, Take 1 capsule by mouth 3 (Three) Times a Day., Disp: 30 capsule, Rfl: 0  •  clobetasol (TEMOVATE) 0.05 % external solution, , Disp: , Rfl:   •  ferrous gluconate (FERGON) 324 MG tablet, Take 324 mg by mouth Daily With Breakfast., Disp: , Rfl:   •  hydrocortisone  "2.5 % cream, , Disp: , Rfl:   •  ibuprofen (ADVIL,MOTRIN) 800 MG tablet, Take 1 tablet by mouth Every 8 (Eight) Hours As Needed for Mild Pain ., Disp: 30 tablet, Rfl: 0  •  ketoconazole (NIZORAL) 2 % shampoo, , Disp: , Rfl:   •  sulfamethoxazole-trimethoprim (Bactrim DS) 800-160 MG per tablet, Take 1 tablet by mouth 2 (Two) Times a Day for 14 days., Disp: 28 tablet, Rfl: 0  •  triamcinolone (KENALOG) 0.1 % cream, , Disp: , Rfl:   3.   Allergies   Allergen Reactions   • Nuts Anaphylaxis and Hives     Tree nuts   • Amoxil [Amoxicillin] Hives   • Flagyl [Metronidazole] Hives   • Penicillins Hives       ROS:  Review of Systems   Constitutional: Negative.  Negative for fever.   HENT: Negative.    Eyes: Negative.    Respiratory: Negative.    Cardiovascular: Negative.    Gastrointestinal: Negative.    Endocrine: Negative.    Musculoskeletal: Negative.    Skin: Negative.    Allergic/Immunologic: Negative.    Neurological: Negative.    Hematological: Negative.    Psychiatric/Behavioral: Negative.    :    -----------------------------------------------PHYSICAL EXAM----------------------------------------------    Vital Signs: /64   Ht 170.2 cm (67\")   Wt 79.1 kg (174 lb 6.4 oz)   BMI 27.31 kg/m²    Flowsheet Rows      First Filed Value   Admission Height  170.2 cm (67\") Documented at 12/21/2020 1049   Admission Weight  --          Physical Exam  Vitals signs and nursing note reviewed.   Constitutional:       Appearance: She is well-developed.   HENT:      Head: Normocephalic and atraumatic.   Neck:      Musculoskeletal: Normal range of motion.   Cardiovascular:      Rate and Rhythm: Normal rate.   Pulmonary:      Effort: Pulmonary effort is normal.   Chest:      Breasts:         Left: Tenderness present. No inverted nipple, nipple discharge or skin change.          Comments: Breast tenderness in area circled.   Abdominal:      General: There is no distension.      Palpations: Abdomen is soft. There is no mass.      " Tenderness: There is no abdominal tenderness. There is no guarding.   Genitourinary:     Vagina: No vaginal discharge.   Musculoskeletal: Normal range of motion.         General: No tenderness or deformity.   Skin:     General: Skin is warm and dry.      Coloration: Skin is not pale.      Findings: No erythema or rash.   Neurological:      Mental Status: She is alert and oriented to person, place, and time.   Psychiatric:         Behavior: Behavior normal.         Thought Content: Thought content normal.         Judgment: Judgment normal.         -----------------------------------------------MEDICAL DECISION MAKING-----------------------------        DATA Review & labs ordered:     1.   Lab Results (last 24 hours)     ** No results found for the last 24 hours. **        2.   Imaging Results (Last 24 Hours)     ** No results found for the last 24 hours. **        3.   ECG/EMG Results (most recent)     None              Diagnoses and all orders for this visit:    1.  delivery delivered: failure to progress (Primary)    2. Pain of right breast  -     US Breast Left Complete; Future  -     Ambulatory Referral to General Surgery    3. Left breast abscess  -     US Breast Left Complete; Future  -     Ambulatory Referral to General Surgery        IMPRESSION/PROBLEM:      Hx R breast abscess. L breast pain. Possible new abscess    (Established problem/s? No, worsening? Yes)    (New Problem/s? Yes, additional workup planned? Yes)      PLAN:     1. Referral to Dr Pompa for evaluation  2. L breast u/s    Pt to call for any results from testing promptly if she does not hear from us.     RTO No follow-ups on file. .  Instructions and precautions given.     TIME: More than 50% of time spent in counseling and/or coordination of care.Time spent in counseling 25 min.  Counseling included the following topics breast abscess with prognosis, differential diagnosis, risks, benefits of treatment, instructions, compliance and/or  risk reduction and alternatives.       Yasir Bernal MD  11:14 EST  12/21/20

## 2020-12-22 ENCOUNTER — TELEPHONE (OUTPATIENT)
Dept: OBSTETRICS AND GYNECOLOGY | Facility: CLINIC | Age: 25
End: 2020-12-22

## 2020-12-23 ENCOUNTER — HOSPITAL ENCOUNTER (EMERGENCY)
Facility: HOSPITAL | Age: 25
Discharge: HOME | End: 2020-12-23
Attending: EMERGENCY MEDICINE | Admitting: EMERGENCY MEDICINE

## 2020-12-23 ENCOUNTER — HOSPITAL ENCOUNTER (OUTPATIENT)
Dept: ULTRASOUND IMAGING | Facility: HOSPITAL | Age: 25
Discharge: HOME | End: 2020-12-23
Admitting: OBSTETRICS & GYNECOLOGY

## 2020-12-23 VITALS
HEIGHT: 67 IN | HEART RATE: 97 BPM | WEIGHT: 175 LBS | DIASTOLIC BLOOD PRESSURE: 76 MMHG | TEMPERATURE: 98.4 F | SYSTOLIC BLOOD PRESSURE: 126 MMHG | OXYGEN SATURATION: 98 % | BODY MASS INDEX: 27.47 KG/M2 | RESPIRATION RATE: 16 BRPM

## 2020-12-23 DIAGNOSIS — N64.4 PAIN OF RIGHT BREAST: ICD-10-CM

## 2020-12-23 DIAGNOSIS — N61.1 LEFT BREAST ABSCESS: ICD-10-CM

## 2020-12-23 DIAGNOSIS — N64.4 NIPPLE PAIN: Primary | ICD-10-CM

## 2020-12-23 PROCEDURE — 76642 ULTRASOUND BREAST LIMITED: CPT

## 2020-12-23 PROCEDURE — 99282 EMERGENCY DEPT VISIT SF MDM: CPT

## 2020-12-23 RX ORDER — CLINDAMYCIN HYDROCHLORIDE 300 MG/1
300 CAPSULE ORAL 3 TIMES DAILY
Qty: 30 CAPSULE | Refills: 0 | Status: SHIPPED | OUTPATIENT
Start: 2020-12-23 | End: 2021-01-02

## 2020-12-23 RX ORDER — IBUPROFEN 800 MG/1
800 TABLET ORAL EVERY 8 HOURS PRN
Qty: 30 TABLET | Refills: 0 | Status: SHIPPED | OUTPATIENT
Start: 2020-12-23

## 2020-12-23 NOTE — ED TRIAGE NOTES
Pt had Ultrasound today of Left breast due to issues with swelling, pain.  Pt states she needs pain relief and so she came to ER.  Mask placed on patient in triage.  Triage RN wearing mask throughout encounter.

## 2020-12-23 NOTE — ED NOTES
Pt noted to have mask on when this RN entered the room.  This RN wore appropriate PPE throughout our encounter. Hand hygiene performed upon entering and exiting room.       Greer Priest RN  12/23/20 1640

## 2020-12-23 NOTE — DISCHARGE INSTRUCTIONS
Wash your bras after each wear    Do warm compresses as much as possible.  Put rice in sock and heat in microwave. BE SURE TO CHECK TEMPERATURE before applying to skin    Follow up with Dr. Pompa or Dr. Brizuela    Although you are being discharged from the ED today, I encourage you to return for worsening symptoms. Things can, and do, change such that treatment at home with medication may not be adequate. Specifically I recommend returning for chest pain or discomfort, difficulty breathing, persistent vomiting or difficulty holding down liquids or medications, fever > 102.0 F,  or any other worsening or alarming symptoms.     Rest. Drink plenty of fluids.  Follow up with PCP or provider listed for further evaluation and management.  Follow up with primary care provider for further management and to have blood pressure rechecked.  Take all medications as prescribed.

## 2020-12-23 NOTE — ED PROVIDER NOTES
Pt presents to the ED c/o  left nipple pain for approximately 1 week that has not responded to Keflex or Bactrim.  She states she was recently treated for a right breast infection which has resolved.  She stopped breast-feeding 4 months ago and has not had any breast-feeding on the left nipple.  She denies fevers or chills but has had increasing pain so she had a left breast ultrasound today.  Ultrasound was negative.     On exam,   Dr. Brizuela, breast surgery, is present in the room and is examining patient.  Heart is regular rate and rhythm without murmurs.  Her lungs are clear to auscultation bilaterally.  The breast exam is deferred to the breast surgeon and to Carmen.  However visual exam of the left nipple shows mild skin breakdown of the nipple.  There is no erythema noted.     Plan: I agree with plan of following the suggestions of the breast surgeon.    Patient was placed in face mask in first look. Patient was wearing facemask when I entered the room and throughout our encounter. I wore full protective equipment throughout this patient encounter including a face mask, eye shield, gown and gloves. Hand hygiene was performed before donning protective equipment and after removal when leaving the room.       Attestation:  The JANNETTE and I have discussed this patient's history, physical exam, and treatment plan.  I have reviewed the documentation and personally had a face to face interaction with the patient. I affirm the documentation and agree with the treatment and plan.  The attached note describes my personal findings.            Delroy Wade MD  12/23/20 7210

## 2020-12-23 NOTE — ED PROVIDER NOTES
EMERGENCY DEPARTMENT ENCOUNTER    Room Number:  07/07  Date seen:  12/23/2020  Time seen: 15:45 EST  PCP: Morro Laura MD  Historian: patient    HPI:  Chief complaint:left breast pain  A complete HPI/ROS/PMH/PSH/SH/FH are unobtainable due to: n/a  Context:Mary Sun is a 25 y.o. female who presents to the ED with c/o moderate, persistent left nipple pain for the past week that has not responded to keflex and Bactrim DS but she states she hasn't done a good job of taking the oral antibiotics as she should.  The nipple pain and swelling are not improved by anything and are worsened by touching the area.   She stopped breastfeeding 4 months ago.  She can still express a scant amount milk from right  Nipple but not left. She has h/o left breast abscess.  She denies any current fever/chills or n/v/d.     Patient was placed in face mask in first look. Patient was wearing facemask when I entered the room and throughout our encounter. I wore full protective equipment throughout this patient encounter including a face mask, eye shield and gloves. Hand hygiene/washing of hands was performed before donning protective equipment and after removal when leaving the room.    MEDICAL RECORD REVIEW  Recent breast ultrasound result:  LIMITED LEFT BREAST SONOGRAPHY     HISTORY: 25-year-old female who stopped breast-feeding approximately 4  months ago and presents with a complaint of left breast itching and a  larger swollen nipple that began approximately 1 week ago.     FINDINGS: Targeted sonographic evaluation of the left breast was  performed through the area of concern which corresponds to the  10-o'clock through 12-o'clock positions, 3-o'clock through 8-o'clock  positions and the periareolar regions. No sonographic abnormality is  appreciated. There is no evidence for skin thickening.     IMPRESSION:  Negative left breast sonography. Clinical follow-up is  recommended.     BI-RADS Category 1: Negative.    ALLERGIES  Nuts,  Amoxil [amoxicillin], Flagyl [metronidazole], and Penicillins    PAST MEDICAL HISTORY  Active Ambulatory Problems     Diagnosis Date Noted   • Genital herpes affecting pregnancy, antepartum 10/22/2019   • Rh negative, antepartum 2020   • Eczema 2020   • Mild tetrahydrocannabinol (THC) abuse: needs counseling 2020   • Anemia, unspecified 2020   • Positive GBS test- PNC allergic, clinda resistant, needs Vanc in labor 2020   • borderline low platelets 2020   •  delivery delivered: failure to progress 2020     Resolved Ambulatory Problems     Diagnosis Date Noted   • Pregnancy 2020   • Encounter for induction of labor 2020     Past Medical History:   Diagnosis Date   • Anemia    • Eczema of both hands    • Herpes        PAST SURGICAL HISTORY  Past Surgical History:   Procedure Laterality Date   •  SECTION N/A 2020    Procedure:  SECTION PRIMARY;  Surgeon: Yasir Bernal MD;  Location: [facility] LABOR DELIVERY;  Service: Obstetrics/Gynecology;  Laterality: N/A;       FAMILY HISTORY  Family History   Problem Relation Age of Onset   • Cancer Mother    • Hypertension Mother    • Hypertension Father    • No Known Problems Sister    • Birth defects Paternal Grandfather    • No Known Problems Maternal Grandmother    • No Known Problems Maternal Grandfather    • No Known Problems Paternal Grandmother        SOCIAL HISTORY  Social History     Socioeconomic History   • Marital status: Single     Spouse name: Not on file   • Number of children: 0   • Years of education: Not on file   • Highest education level: Not on file   Tobacco Use   • Smoking status: Former Smoker   • Smokeless tobacco: Never Used   • Tobacco comment: occasional use    Substance and Sexual Activity   • Alcohol use: No     Frequency: Never     Comment: occasional   • Drug use: No   • Sexual activity: Defer     Partners: Male     Birth control/protection: None        REVIEW OF SYSTEMS  Review of Systems    All systems reviewed and negative except for those discussed in HPI.     PHYSICAL EXAM    ED Triage Vitals [12/23/20 1430]   Temp Heart Rate Resp BP SpO2   98.4 °F (36.9 °C) 97 16 -- 100 %      Temp src Heart Rate Source Patient Position BP Location FiO2 (%)   -- -- -- -- --     Physical Exam    I have reviewed the triage vital signs and nursing notes.      GENERAL: not distressed  HENT: nares patent  EYES: no scleral icterus  NECK: no ROM limitations  CV: regular rhythm, regular rate, no murmur, no rubs, no gallups  RESPIRATORY: normal effort, CTAB  ABDOMEN: soft  BREAST: left breast is soft, no masses palpated.  She does have some tenderness around the nipple and it is firm.  I am unable to express any milk or fluid.    : deferred  MUSCULOSKELETAL: no deformity  NEURO: alert, moves all extremities, follows commands  SKIN: warm, dry      PROGRESS, DATA ANALYSIS, CONSULTS AND MEDICAL DECISION MAKING  All labs have been independently reviewed by me.  All radiology studies have been reviewed by me and discussed with radiologist dictating the report.  EKG's independently viewed and interpreted by me unless stated otherwise. Discussion below represents my analysis of pertinent findings related to patient's condition, differential diagnosis, treatment plan and final disposition.     ED Course as of Dec 23 1641   Wed Dec 23, 2020   1606 Discussed patient with Dr. Brizuela, Breast surgery.  He will come and see patient in ER.     [EW]   1630 Dr. Brizuela, breast surgeon is here to examine patient.  I was chaperone. We discussed plan with patient to continue hot compresses and I will write her RX for Clindamycin again, this dosing may be easier for her to comply with.  She will either follow with Dr. Pompa or Dr. Brizuela.  Discussed with Dr. Wade    MDM/dispo:  Pt does not appear toxic or septic.  No fevers.  Will encourage patient to do hot rice sock compresses to  "encourage drainage.  I also discussed washing bras daily.      [EW]      ED Course User Index  [EW] Carmen Lee, APRN     DDX: left breast abscess, left nipple abscess, mastitits, clogged milk duct    MDM:  See above in ED course    Reviewed pt's history and workup with Dr. Wade.  After a bedside evaluation, Dr. Wade agrees with the plan of care.    The patient's history, physical exam, and lab findings were discussed with the physician, who also performed a face to face history and physical exam.  I discussed all results and noted any abnormalities with patient.  Discussed absoute need to recheck abnormalities with their family physician.  I answered any of the patient's questions.  Discussed plan for discharge, as there is no emergent indication for admission.  Pt is agreeable and understands need for follow up and repeat testing.  Pt is aware that discharge does not mean that nothing is wrong but it indicates no emergency is present and they must continue care with their family physician.  Pt is discharged with instructions to follow up with primary care doctor to have their blood pressure rechecked.         Disposition vitals:  /76 (BP Location: Right arm, Patient Position: Sitting)   Pulse 97   Temp 98.4 °F (36.9 °C)   Resp 16   Ht 170.2 cm (67\")   Wt 79.4 kg (175 lb)   SpO2 98%   BMI 27.41 kg/m²       DIAGNOSIS  Final diagnoses:   Nipple pain, left       FOLLOW UP   Victoria Pompa MD  [address]  [facility] [id number] [phone]          Mario Brizuela MD  [address]  [facility] [address] [phone]                 Carmen Lee, APRN  12/23/20 1641    "

## 2020-12-29 ENCOUNTER — OFFICE VISIT (OUTPATIENT)
Dept: SURGERY | Facility: CLINIC | Age: 25
End: 2020-12-29

## 2020-12-29 VITALS
DIASTOLIC BLOOD PRESSURE: 72 MMHG | SYSTOLIC BLOOD PRESSURE: 122 MMHG | BODY MASS INDEX: 27 KG/M2 | HEIGHT: 67 IN | HEART RATE: 116 BPM | OXYGEN SATURATION: 99 % | WEIGHT: 172 LBS

## 2020-12-29 DIAGNOSIS — N61.1 LEFT BREAST ABSCESS: Primary | ICD-10-CM

## 2020-12-29 PROCEDURE — 99024 POSTOP FOLLOW-UP VISIT: CPT | Performed by: SURGERY

## 2021-01-08 RX ORDER — MEDROXYPROGESTERONE ACETATE 150 MG/ML
150 INJECTION, SUSPENSION INTRAMUSCULAR
Qty: 1 ML | Refills: 0 | Status: SHIPPED | OUTPATIENT
Start: 2021-01-08 | End: 2021-01-29 | Stop reason: SDUPTHER

## 2021-01-29 ENCOUNTER — CLINICAL SUPPORT (OUTPATIENT)
Dept: OBSTETRICS AND GYNECOLOGY | Facility: CLINIC | Age: 26
End: 2021-01-29

## 2021-01-29 VITALS — HEIGHT: 67 IN | WEIGHT: 172 LBS | BODY MASS INDEX: 27 KG/M2

## 2021-01-29 DIAGNOSIS — Z30.013 ENCOUNTER FOR INITIAL PRESCRIPTION OF INJECTABLE CONTRACEPTIVE: Primary | ICD-10-CM

## 2021-01-29 PROCEDURE — 96372 THER/PROPH/DIAG INJ SC/IM: CPT | Performed by: OBSTETRICS & GYNECOLOGY

## 2021-01-29 RX ORDER — MEDROXYPROGESTERONE ACETATE 150 MG/ML
150 INJECTION, SUSPENSION INTRAMUSCULAR ONCE
Status: COMPLETED | OUTPATIENT
Start: 2021-01-29 | End: 2021-01-29

## 2021-01-29 RX ADMIN — MEDROXYPROGESTERONE ACETATE 150 MG: 150 INJECTION, SUSPENSION INTRAMUSCULAR at 13:57

## 2024-11-26 ENCOUNTER — HOSPITAL ENCOUNTER (EMERGENCY)
Facility: HOSPITAL | Age: 29
Discharge: HOME | End: 2024-11-26
Attending: EMERGENCY MEDICINE | Admitting: EMERGENCY MEDICINE

## 2024-11-26 VITALS
OXYGEN SATURATION: 97 % | SYSTOLIC BLOOD PRESSURE: 122 MMHG | DIASTOLIC BLOOD PRESSURE: 72 MMHG | TEMPERATURE: 97.1 F | RESPIRATION RATE: 18 BRPM | HEIGHT: 65 IN | BODY MASS INDEX: 27.49 KG/M2 | WEIGHT: 165 LBS | HEART RATE: 104 BPM

## 2024-11-26 DIAGNOSIS — E87.6 HYPOKALEMIA: ICD-10-CM

## 2024-11-26 DIAGNOSIS — R19.7 DIARRHEA, UNSPECIFIED TYPE: Primary | ICD-10-CM

## 2024-11-26 LAB
ALBUMIN SERPL-MCNC: 4.4 G/DL (ref 3.5–5.2)
ALBUMIN/GLOB SERPL: 1.6 G/DL
ALP SERPL-CCNC: 75 U/L (ref 39–117)
ALT SERPL W P-5'-P-CCNC: 21 U/L (ref 1–33)
ANION GAP SERPL CALCULATED.3IONS-SCNC: 10.7 MMOL/L (ref 5–15)
AST SERPL-CCNC: 20 U/L (ref 1–32)
BACTERIA UR QL AUTO: ABNORMAL /HPF
BASOPHILS # BLD AUTO: 0.01 10*3/MM3 (ref 0–0.2)
BASOPHILS NFR BLD AUTO: 0.2 % (ref 0–1.5)
BILIRUB SERPL-MCNC: 0.3 MG/DL (ref 0–1.2)
BILIRUB UR QL STRIP: NEGATIVE
BUN SERPL-MCNC: 12 MG/DL (ref 6–20)
BUN/CREAT SERPL: 17.1 (ref 7–25)
CALCIUM SPEC-SCNC: 8.9 MG/DL (ref 8.6–10.5)
CHLORIDE SERPL-SCNC: 101 MMOL/L (ref 98–107)
CLARITY UR: CLEAR
CO2 SERPL-SCNC: 26.3 MMOL/L (ref 22–29)
COLOR UR: YELLOW
CREAT SERPL-MCNC: 0.7 MG/DL (ref 0.57–1)
DEPRECATED RDW RBC AUTO: 37.7 FL (ref 37–54)
EGFRCR SERPLBLD CKD-EPI 2021: 121.7 ML/MIN/1.73
EOSINOPHIL # BLD AUTO: 0.16 10*3/MM3 (ref 0–0.4)
EOSINOPHIL NFR BLD AUTO: 3.3 % (ref 0.3–6.2)
ERYTHROCYTE [DISTWIDTH] IN BLOOD BY AUTOMATED COUNT: 11.8 % (ref 12.3–15.4)
GLOBULIN UR ELPH-MCNC: 2.7 GM/DL
GLUCOSE SERPL-MCNC: 108 MG/DL (ref 65–99)
GLUCOSE UR STRIP-MCNC: NEGATIVE MG/DL
HCG SERPL QL: NEGATIVE
HCT VFR BLD AUTO: 35.2 % (ref 34–46.6)
HGB BLD-MCNC: 12.2 G/DL (ref 12–15.9)
HGB UR QL STRIP.AUTO: NEGATIVE
HOLD SPECIMEN: NORMAL
HYALINE CASTS UR QL AUTO: ABNORMAL /LPF
IMM GRANULOCYTES # BLD AUTO: 0.02 10*3/MM3 (ref 0–0.05)
IMM GRANULOCYTES NFR BLD AUTO: 0.4 % (ref 0–0.5)
KETONES UR QL STRIP: NEGATIVE
LEUKOCYTE ESTERASE UR QL STRIP.AUTO: ABNORMAL
LIPASE SERPL-CCNC: 39 U/L (ref 13–60)
LYMPHOCYTES # BLD AUTO: 1.52 10*3/MM3 (ref 0.7–3.1)
LYMPHOCYTES NFR BLD AUTO: 31.5 % (ref 19.6–45.3)
MCH RBC QN AUTO: 30.7 PG (ref 26.6–33)
MCHC RBC AUTO-ENTMCNC: 34.7 G/DL (ref 31.5–35.7)
MCV RBC AUTO: 88.7 FL (ref 79–97)
MONOCYTES # BLD AUTO: 0.51 10*3/MM3 (ref 0.1–0.9)
MONOCYTES NFR BLD AUTO: 10.6 % (ref 5–12)
NEUTROPHILS NFR BLD AUTO: 2.61 10*3/MM3 (ref 1.7–7)
NEUTROPHILS NFR BLD AUTO: 54 % (ref 42.7–76)
NITRITE UR QL STRIP: NEGATIVE
NRBC BLD AUTO-RTO: 0 /100 WBC (ref 0–0.2)
PH UR STRIP.AUTO: 6 [PH] (ref 5–8)
PLATELET # BLD AUTO: 230 10*3/MM3 (ref 140–450)
PMV BLD AUTO: 9.5 FL (ref 6–12)
POTASSIUM SERPL-SCNC: 3.3 MMOL/L (ref 3.5–5.2)
PROT SERPL-MCNC: 7.1 G/DL (ref 6–8.5)
PROT UR QL STRIP: NEGATIVE
RBC # BLD AUTO: 3.97 10*6/MM3 (ref 3.77–5.28)
RBC # UR STRIP: ABNORMAL /HPF
REF LAB TEST METHOD: ABNORMAL
SODIUM SERPL-SCNC: 138 MMOL/L (ref 136–145)
SP GR UR STRIP: 1.02 (ref 1–1.03)
SQUAMOUS #/AREA URNS HPF: ABNORMAL /HPF
UROBILINOGEN UR QL STRIP: ABNORMAL
WBC # UR STRIP: ABNORMAL /HPF
WBC NRBC COR # BLD AUTO: 4.83 10*3/MM3 (ref 3.4–10.8)
WHOLE BLOOD HOLD COAG: NORMAL
WHOLE BLOOD HOLD SPECIMEN: NORMAL

## 2024-11-26 PROCEDURE — 84703 CHORIONIC GONADOTROPIN ASSAY: CPT | Performed by: EMERGENCY MEDICINE

## 2024-11-26 PROCEDURE — 85025 COMPLETE CBC W/AUTO DIFF WBC: CPT | Performed by: EMERGENCY MEDICINE

## 2024-11-26 PROCEDURE — 99283 EMERGENCY DEPT VISIT LOW MDM: CPT

## 2024-11-26 PROCEDURE — 81001 URINALYSIS AUTO W/SCOPE: CPT | Performed by: EMERGENCY MEDICINE

## 2024-11-26 PROCEDURE — 36415 COLL VENOUS BLD VENIPUNCTURE: CPT | Performed by: EMERGENCY MEDICINE

## 2024-11-26 PROCEDURE — 80053 COMPREHEN METABOLIC PANEL: CPT | Performed by: EMERGENCY MEDICINE

## 2024-11-26 PROCEDURE — 83690 ASSAY OF LIPASE: CPT | Performed by: EMERGENCY MEDICINE

## 2024-11-26 RX ORDER — POTASSIUM CHLORIDE 750 MG/1
40 TABLET, FILM COATED, EXTENDED RELEASE ORAL ONCE
Status: COMPLETED | OUTPATIENT
Start: 2024-11-26 | End: 2024-11-26

## 2024-11-26 RX ORDER — SODIUM CHLORIDE 0.9 % (FLUSH) 0.9 %
10 SYRINGE (ML) INJECTION AS NEEDED
Status: DISCONTINUED | OUTPATIENT
Start: 2024-11-26 | End: 2024-11-26 | Stop reason: HOSPADM

## 2024-11-26 RX ADMIN — POTASSIUM CHLORIDE 40 MEQ: 750 TABLET, EXTENDED RELEASE ORAL at 13:00

## 2024-11-26 NOTE — ED TRIAGE NOTES
Patient to ED via pv from home. Patient c/o bilateral foot pain x 1 month and bilateral thigh pain that began Sunday. Patient also reports she had diarrhea that began Sunday and reports there was blood in her stool.

## 2024-11-26 NOTE — ED PROVIDER NOTES
EMERGENCY DEPARTMENT ENCOUNTER  Room Number:    PCP: Morro Laura MD  Independent Historians: Patient      HPI:  Chief Complaint: had concerns including Diarrhea, Leg Pain, and Foot Pain.     A complete HPI/ROS/PMH/PSH/SH/FH are unobtainable due to: None    Chronic or social conditions impacting patient care (Social Determinants of Health): None      Context: Mary Sun is a 28 y.o. female with a medical history of anemia who presents to the ED c/o acute diarrhea, thigh cramping.  The patient reports for the last 3 days she has had some diarrhea.  She reports some cramping in her thighs.  She reports she feels somewhat bloated.  She denies any fevers or chills.  She reports she had a little bit of blood in her stool over the last 3 days.  She states it is bright red with bowel movement.  She denies abdominal pain.      Review of prior external notes (non-ED) -and- Review of prior external test results outside of this encounter:  Laboratory evaluation 2020 shows normal CMP, normal CBC except for hemoglobin 9.7    Prescription drug monitoring program review:         PAST MEDICAL HISTORY  Active Ambulatory Problems     Diagnosis Date Noted    Genital herpes affecting pregnancy, antepartum 10/22/2019    Rh negative, antepartum 2020    Eczema 2020    Mild tetrahydrocannabinol (THC) abuse: needs counseling 2020    Anemia, unspecified 2020    Positive GBS test- PNC allergic, clinda resistant, needs Vanc in labor 2020    borderline low platelets 2020     delivery delivered: failure to progress 2020     Resolved Ambulatory Problems     Diagnosis Date Noted    Pregnancy 2020    Encounter for induction of labor 2020     Past Medical History:   Diagnosis Date    Anemia     Eczema of both hands     Herpes          PAST SURGICAL HISTORY  Past Surgical History:   Procedure Laterality Date     SECTION N/A 2020    Procedure:  SECTION  PRIMARY;  Surgeon: Yasir Bernal MD;  Location: [facility] LABOR DELIVERY;  Service: Obstetrics/Gynecology;  Laterality: N/A;         FAMILY HISTORY  Family History   Problem Relation Age of Onset    Cancer Mother     Hypertension Mother     Hypertension Father     No Known Problems Sister     Birth defects Paternal Grandfather     No Known Problems Maternal Grandmother     No Known Problems Maternal Grandfather     No Known Problems Paternal Grandmother          SOCIAL HISTORY  Social History     Socioeconomic History    Marital status: Single    Number of children: 0   Tobacco Use    Smoking status: Former    Smokeless tobacco: Never    Tobacco comments:     occasional use    Substance and Sexual Activity    Alcohol use: No     Comment: occasional    Drug use: No    Sexual activity: Defer     Partners: Male     Birth control/protection: None         ALLERGIES  Nuts, Amoxil [amoxicillin], Flagyl [metronidazole], and Penicillins      REVIEW OF SYSTEMS  Review of Systems  Included in HPI  All systems reviewed and negative except for those discussed in HPI.      PHYSICAL EXAM    I have reviewed the triage vital signs and nursing notes.    ED Triage Vitals   Temp Heart Rate Resp BP SpO2   11/26/24 1138 11/26/24 1138 11/26/24 1138 11/26/24 1141 11/26/24 1138   97.1 °F (36.2 °C) 104 18 122/72 97 %      Temp src Heart Rate Source Patient Position BP Location FiO2 (%)   -- -- -- -- --              Physical Exam  GENERAL: Awake, alert, no acute distress, not acutely ill-appearing  SKIN: Warm, dry  HENT: Normocephalic, atraumatic  EYES: no scleral icterus  CV: regular rhythm, regular rate  RESPIRATORY: normal effort, lungs clear  ABDOMEN: soft, nontender, nondistended  MUSCULOSKELETAL: no deformity  NEURO: alert, moves all extremities, follows commands            LAB RESULTS  Recent Results (from the past 24 hours)   Comprehensive Metabolic Panel    Collection Time: 11/26/24 11:49 AM    Specimen: Arm, Right; Blood    Result Value Ref Range    Glucose 108 (H) 65 - 99 mg/dL    BUN 12 6 - 20 mg/dL    Creatinine 0.70 0.57 - 1.00 mg/dL    Sodium 138 136 - 145 mmol/L    Potassium 3.3 (L) 3.5 - 5.2 mmol/L    Chloride 101 98 - 107 mmol/L    CO2 26.3 22.0 - 29.0 mmol/L    Calcium 8.9 8.6 - 10.5 mg/dL    Total Protein 7.1 6.0 - 8.5 g/dL    Albumin 4.4 3.5 - 5.2 g/dL    ALT (SGPT) 21 1 - 33 U/L    AST (SGOT) 20 1 - 32 U/L    Alkaline Phosphatase 75 39 - 117 U/L    Total Bilirubin 0.3 0.0 - 1.2 mg/dL    Globulin 2.7 gm/dL    A/G Ratio 1.6 g/dL    BUN/Creatinine Ratio 17.1 7.0 - 25.0    Anion Gap 10.7 5.0 - 15.0 mmol/L    eGFR 121.7 >60.0 mL/min/1.73   Lipase    Collection Time: 11/26/24 11:49 AM    Specimen: Arm, Right; Blood   Result Value Ref Range    Lipase 39 13 - 60 U/L   hCG, Serum, Qualitative    Collection Time: 11/26/24 11:49 AM    Specimen: Arm, Right; Blood   Result Value Ref Range    HCG Qualitative Negative Negative   Green Top (Gel)    Collection Time: 11/26/24 11:49 AM   Result Value Ref Range    Extra Tube Hold for add-ons.    Lavender Top    Collection Time: 11/26/24 11:49 AM   Result Value Ref Range    Extra Tube hold for add-on    Light Blue Top    Collection Time: 11/26/24 11:49 AM   Result Value Ref Range    Extra Tube Hold for add-ons.    CBC Auto Differential    Collection Time: 11/26/24 11:49 AM    Specimen: Arm, Right; Blood   Result Value Ref Range    WBC 4.83 3.40 - 10.80 10*3/mm3    RBC 3.97 3.77 - 5.28 10*6/mm3    Hemoglobin 12.2 12.0 - 15.9 g/dL    Hematocrit 35.2 34.0 - 46.6 %    MCV 88.7 79.0 - 97.0 fL    MCH 30.7 26.6 - 33.0 pg    MCHC 34.7 31.5 - 35.7 g/dL    RDW 11.8 (L) 12.3 - 15.4 %    RDW-SD 37.7 37.0 - 54.0 fl    MPV 9.5 6.0 - 12.0 fL    Platelets 230 140 - 450 10*3/mm3    Neutrophil % 54.0 42.7 - 76.0 %    Lymphocyte % 31.5 19.6 - 45.3 %    Monocyte % 10.6 5.0 - 12.0 %    Eosinophil % 3.3 0.3 - 6.2 %    Basophil % 0.2 0.0 - 1.5 %    Immature Grans % 0.4 0.0 - 0.5 %    Neutrophils, Absolute 2.61 1.70  - 7.00 10*3/mm3    Lymphocytes, Absolute 1.52 0.70 - 3.10 10*3/mm3    Monocytes, Absolute 0.51 0.10 - 0.90 10*3/mm3    Eosinophils, Absolute 0.16 0.00 - 0.40 10*3/mm3    Basophils, Absolute 0.01 0.00 - 0.20 10*3/mm3    Immature Grans, Absolute 0.02 0.00 - 0.05 10*3/mm3    nRBC 0.0 0.0 - 0.2 /100 WBC   Urinalysis With Microscopic If Indicated (No Culture) - Urine, Clean Catch    Collection Time: 11/26/24 12:21 PM    Specimen: Urine, Clean Catch   Result Value Ref Range    Color, UA Yellow Yellow, Straw    Appearance, UA Clear Clear    pH, UA 6.0 5.0 - 8.0    Specific Gravity, UA 1.020 1.005 - 1.030    Glucose, UA Negative Negative    Ketones, UA Negative Negative    Bilirubin, UA Negative Negative    Blood, UA Negative Negative    Protein, UA Negative Negative    Leuk Esterase, UA Trace (A) Negative    Nitrite, UA Negative Negative    Urobilinogen, UA 0.2 E.U./dL 0.2 - 1.0 E.U./dL   Urinalysis, Microscopic Only - Urine, Clean Catch    Collection Time: 11/26/24 12:21 PM    Specimen: Urine, Clean Catch   Result Value Ref Range    RBC, UA 0-2 None Seen, 0-2 /HPF    WBC, UA 6-10 (A) None Seen, 0-2 /HPF    Bacteria, UA 1+ (A) None Seen /HPF    Squamous Epithelial Cells, UA 7-12 (A) None Seen, 0-2 /HPF    Hyaline Casts, UA None Seen None Seen /LPF    Methodology Automated Microscopy          RADIOLOGY  No Radiology Exams Resulted Within Past 24 Hours      MEDICATIONS GIVEN IN ER  Medications   sodium chloride 0.9 % flush 10 mL (has no administration in time range)   potassium chloride (K-DUR,KLOR-CON) ER tablet 40 mEq (has no administration in time range)         ORDERS PLACED DURING THIS VISIT:  Orders Placed This Encounter   Procedures    Rufus Draw    Comprehensive Metabolic Panel    Lipase    Urinalysis With Microscopic If Indicated (No Culture) - Urine, Clean Catch    hCG, Serum, Qualitative    CBC Auto Differential    Urinalysis, Microscopic Only - Urine, Clean Catch    NPO Diet NPO Type: Strict NPO    Undress &  Gown    CBC & Differential    Green Top (Gel)    Lavender Top    Light Blue Top         OUTPATIENT MEDICATION MANAGEMENT:  Current Facility-Administered Medications Ordered in Epic   Medication Dose Route Frequency Provider Last Rate Last Admin    potassium chloride (K-DUR,KLOR-CON) ER tablet 40 mEq  40 mEq Oral Once Willie Calhoun MD        sodium chloride 0.9 % flush 10 mL  10 mL Intravenous PRN Willie Calhoun MD         Current Outpatient Medications Ordered in Epic   Medication Sig Dispense Refill    betamethasone valerate (VALISONE) 0.1 % ointment       clindamycin (Cleocin) 300 MG capsule Take 1 capsule by mouth 3 (Three) Times a Day. 30 capsule 0    clobetasol (TEMOVATE) 0.05 % external solution       ferrous gluconate (FERGON) 324 MG tablet Take 324 mg by mouth Daily With Breakfast.      hydrocortisone 2.5 % cream       ibuprofen (ADVIL,MOTRIN) 800 MG tablet Take 1 tablet by mouth Every 8 (Eight) Hours As Needed for Mild Pain . 30 tablet 0    ibuprofen (ADVIL,MOTRIN) 800 MG tablet Take 1 tablet by mouth Every 8 (Eight) Hours As Needed for Moderate Pain . 30 tablet 0    ketoconazole (NIZORAL) 2 % shampoo       triamcinolone (KENALOG) 0.1 % cream            PROCEDURES  Procedures            PROGRESS, DATA ANALYSIS, CONSULTS, AND MEDICAL DECISION MAKING  All labs have been independently interpreted by me.  All radiology studies have been reviewed by me. All EKG's have been independently viewed and interpreted by me.  Discussion below represents my analysis of pertinent findings related to patient's condition, differential diagnosis, treatment plan and final disposition.    Differential diagnosis includes but is not limited to GI bleed, gastritis, colitis, diarrhea, infectious diarrhea, anemia, dehydration, electrolyte disturbance.    Clinical Scores:                                     ED Course as of 11/26/24 1259   Tue Nov 26, 2024   1255 Rectal exam shows heme negative brown stool. [TR]   1255 Reviewed  the workup and findings with the patient at the bedside.  Answered all questions.  She has mild hypokalemia.  Otherwise her workup is unremarkable.  Plan to replace her potassium, plan discharge home.  She has no evidence of GI bleed.  Hemoglobin is normal. [TR]      ED Course User Index  [TR] Willie Calhoun MD             AS OF 12:59 EST VITALS:    BP - 122/72  HR - 104  TEMP - 97.1 °F (36.2 °C)  O2 SATS - 97%    COMPLEXITY OF CARE  Admission was considered but after careful review of the patient's presentation, physical examination, diagnostic results, and response to treatment the patient may be safely discharged with outpatient follow-up.      DIAGNOSIS  Final diagnoses:   Diarrhea, unspecified type   Hypokalemia         DISPOSITION  ED Disposition       ED Disposition   Discharge    Condition   Stable    Comment   --                Please note that portions of this document were completed with a voice recognition program.    Note Disclaimer: At [facility], we believe that sharing information builds trust and better relationships. You are receiving this note because you recently visited [facility]. It is possible you will see health information before a provider has talked with you about it. This kind of information can be easy to misunderstand. To help you fully understand what it means for your health, we urge you to discuss this note with your provider.         Willie Calhoun MD  11/26/24 9601       Willie Calhoun MD  11/26/24 0929

## 2024-11-26 NOTE — DISCHARGE INSTRUCTIONS
Follow-up with your primary care doctor for further evaluation.  Return immediately for any severe abdominal pain, fevers, or passing out.

## 2025-01-15 ENCOUNTER — OFFICE VISIT (OUTPATIENT)
Dept: FAMILY MEDICINE CLINIC | Facility: CLINIC | Age: 30
End: 2025-01-15
Payer: COMMERCIAL

## 2025-01-15 VITALS
SYSTOLIC BLOOD PRESSURE: 130 MMHG | HEIGHT: 65 IN | DIASTOLIC BLOOD PRESSURE: 82 MMHG | RESPIRATION RATE: 16 BRPM | BODY MASS INDEX: 27.51 KG/M2 | TEMPERATURE: 97.5 F | HEART RATE: 85 BPM | OXYGEN SATURATION: 98 % | WEIGHT: 165.1 LBS

## 2025-01-15 DIAGNOSIS — E87.6 HYPOKALEMIA: ICD-10-CM

## 2025-01-15 DIAGNOSIS — Z30.9 ENCOUNTER FOR CONTRACEPTIVE MANAGEMENT, UNSPECIFIED TYPE: ICD-10-CM

## 2025-01-15 DIAGNOSIS — L21.9 SEBORRHEIC DERMATITIS: Chronic | ICD-10-CM

## 2025-01-15 DIAGNOSIS — R41.840 ATTENTION DEFICIT: ICD-10-CM

## 2025-01-15 DIAGNOSIS — M72.2 BILATERAL PLANTAR FASCIITIS: ICD-10-CM

## 2025-01-15 DIAGNOSIS — Z76.89 ENCOUNTER TO ESTABLISH CARE: Primary | ICD-10-CM

## 2025-01-15 DIAGNOSIS — Z23 IMMUNIZATION DUE: ICD-10-CM

## 2025-01-15 DIAGNOSIS — A60.00 GENITAL HERPES SIMPLEX, UNSPECIFIED SITE: Chronic | ICD-10-CM

## 2025-01-15 DIAGNOSIS — L50.2 URTICARIA DUE TO COLD: ICD-10-CM

## 2025-01-15 PROBLEM — Z67.91 RH NEGATIVE, ANTEPARTUM: Status: RESOLVED | Noted: 2020-03-31 | Resolved: 2025-01-15

## 2025-01-15 PROBLEM — B95.1 POSITIVE GBS TEST: Status: RESOLVED | Noted: 2020-05-11 | Resolved: 2025-01-15

## 2025-01-15 PROBLEM — O26.899 RH NEGATIVE, ANTEPARTUM: Status: RESOLVED | Noted: 2020-03-31 | Resolved: 2025-01-15

## 2025-01-15 PROBLEM — D69.6 THROMBOCYTOPENIA AFFECTING PREGNANCY: Status: RESOLVED | Noted: 2020-06-03 | Resolved: 2025-01-15

## 2025-01-15 PROBLEM — D64.9 ANEMIA, UNSPECIFIED: Status: RESOLVED | Noted: 2020-05-04 | Resolved: 2025-01-15

## 2025-01-15 PROBLEM — O99.119 THROMBOCYTOPENIA AFFECTING PREGNANCY: Status: RESOLVED | Noted: 2020-06-03 | Resolved: 2025-01-15

## 2025-01-15 PROCEDURE — 90656 IIV3 VACC NO PRSV 0.5 ML IM: CPT | Performed by: STUDENT IN AN ORGANIZED HEALTH CARE EDUCATION/TRAINING PROGRAM

## 2025-01-15 PROCEDURE — 90471 IMMUNIZATION ADMIN: CPT | Performed by: STUDENT IN AN ORGANIZED HEALTH CARE EDUCATION/TRAINING PROGRAM

## 2025-01-15 PROCEDURE — 99204 OFFICE O/P NEW MOD 45 MIN: CPT | Performed by: STUDENT IN AN ORGANIZED HEALTH CARE EDUCATION/TRAINING PROGRAM

## 2025-01-15 RX ORDER — DUPILUMAB 300 MG/2ML
INJECTION, SOLUTION SUBCUTANEOUS
COMMUNITY

## 2025-01-15 RX ORDER — VALACYCLOVIR HYDROCHLORIDE 500 MG/1
500 TABLET, FILM COATED ORAL 2 TIMES DAILY
Qty: 6 TABLET | Refills: 7 | Status: SHIPPED | OUTPATIENT
Start: 2025-01-15

## 2025-01-15 RX ORDER — VALACYCLOVIR HYDROCHLORIDE 500 MG/1
1 TABLET, FILM COATED ORAL DAILY
COMMUNITY
End: 2025-01-15 | Stop reason: SDUPTHER

## 2025-01-15 RX ORDER — ELECTROLYTES/DEXTROSE
1 SOLUTION, ORAL ORAL DAILY
Qty: 90 TABLET | Refills: 3 | Status: SHIPPED | OUTPATIENT
Start: 2025-01-15 | End: 2026-01-10

## 2025-01-15 NOTE — PROGRESS NOTES
Chief Complaint  Establish Care and referral (Dermatologist (medication for Dupixent)/Podiatrist-pain; ongoing for 2-3 months-tender, sore and swollen); worse in mornings & after sitting for awhile and then stands up.)    Subjective        Mary Sun presents to [facility]  History of Present Illness    30yo AAF Patient was previously NOT seen by PCP at [facility], and patient is new to me and is here for establishment of care visit for chronic medical conditions including hx of anemia, HSV2,  seborrheic dermatitis on hands and scalp and was on dupxin .    Pt prior PCP was Morro Jackson last seen in 2021 prior to moving to [place].  Last PCP in [place] was Dr KUMAR.    Instructed patient to get the adult preventive immunization that are due at  the pharmacy, including - flu.  Patient consented to receive flu shot here at the clinic today.    Patient complaining of pain in the soles of her feet bilaterally and has been getting the crocs lately.  Patient works at a [employer] and has to stand long hours.  Patient states the pain in the sole of her feet is worse in the morning.    Patient also complaining of attention deficit and has trouble remembering stuff at times.  Patient denied any anxiety or depression.    Patient desires referral to OB/GYN for contraception management.  Patient states her last Pap smear was in [place] in 2024 and abnormal.  Instructed patient she needs to have a Pap smear every 3 years in her 20s, patient voiced understanding.    Patient desires dermatology referral for seborrheic dermatitis management.    Patient went to the ER in late 2024 for diarrhea and was found to have hypokalemia.  Discussed repeat potassium check, patient voiced understanding.  Instructed patient to return to clinic for fasting lipid panel on follow-up visit.  Discussed normal CBC found on recent ER visit with patient today.    Patient also  "reports she has a history of cold urticaria but under control now.         Objective   Vital Signs:  /82 (BP Location: Left arm, Patient Position: Sitting, Cuff Size: Adult)   Pulse 85   Temp 97.5 °F (36.4 °C) (Temporal)   Resp 16   Ht 165.1 cm (65\")   Wt 74.9 kg (165 lb 1.6 oz)   SpO2 98%   BMI 27.47 kg/m²   Estimated body mass index is 27.47 kg/m² as calculated from the following:    Height as of this encounter: 165.1 cm (65\").    Weight as of this encounter: 74.9 kg (165 lb 1.6 oz).       BMI is >= 25 and <30. (Overweight) The following options were offered after discussion;: exercise counseling/recommendations and nutrition counseling/recommendations      Physical Exam  Constitutional:       Appearance: Normal appearance.   HENT:      Head: Normocephalic and atraumatic.      Mouth/Throat:      Mouth: Mucous membranes are moist.      Pharynx: Oropharynx is clear. No oropharyngeal exudate or posterior oropharyngeal erythema.   Eyes:      Conjunctiva/sclera: Conjunctivae normal.   Cardiovascular:      Rate and Rhythm: Normal rate and regular rhythm.      Heart sounds: Normal heart sounds.   Pulmonary:      Effort: Pulmonary effort is normal.      Breath sounds: Normal breath sounds.   Abdominal:      General: Bowel sounds are normal.      Palpations: Abdomen is soft.      Comments: Non-tender   Musculoskeletal:      Comments: Pain in the soles in the center of feet bilaterally on palpation.  No lesions noted bilateral feet.  Normal shape of feet bilaterally.   Lymphadenopathy:      Cervical: No cervical adenopathy.   Skin:     General: Skin is warm.   Neurological:      General: No focal deficit present.      Mental Status: She is alert and oriented to person, place, and time.   Psychiatric:         Mood and Affect: Mood normal.         Behavior: Behavior normal.        Result Review :    The following data was reviewed by: PB Terry on 01/15/2025:  MIRTHA          11/26/2024    11:49 "   CMP   Glucose 108    BUN 12    Creatinine 0.70    EGFR 121.7    Sodium 138    Potassium 3.3    Chloride 101    Calcium 8.9    Total Protein 7.1    Albumin 4.4    Globulin 2.7    Total Bilirubin 0.3    Alkaline Phosphatase 75    AST (SGOT) 20    ALT (SGPT) 21    Albumin/Globulin Ratio 1.6    BUN/Creatinine Ratio 17.1    Anion Gap 10.7      CBC          11/26/2024    11:49   CBC   WBC 4.83    RBC 3.97    Hemoglobin 12.2    Hematocrit 35.2    MCV 88.7    MCH 30.7    MCHC 34.7    RDW 11.8    Platelets 230                             Assessment and Plan     Diagnoses and all orders for this visit:    1. Encounter to establish care (Primary)    2. Hypokalemia  -     Basic metabolic panel    3. Bilateral plantar fasciitis  Assessment & Plan:  Instructed patient to use a frozen water bottle and roll the soles of her feet for 15 minutes in the evening.  Counseled patient to not wake until the feet or soles get totally numb, patient voiced understanding.  Also instructed patient to change her shoes and purchase shoes that have more cushioning.  Alternatively patient may also get fitted for insoles as needed.  Discussed physical therapy referral as well.  Patient to return to clinic if lack of resolution plantar fasciitis.    Orders:  -     Diclofenac Sodium (VOLTAREN) 1 % gel gel; Apply 4 g topically to the appropriate area as directed 2 (Two) Times a Day As Needed (MSK pain) for up to 90 days.  Dispense: 100 g; Refill: 2  -     Ambulatory Referral to Physical Therapy for Evaluation & Treatment    4. Urticaria due to cold    5. Seborrheic dermatitis  -     Ambulatory Referral to Dermatology    6. Attention deficit  -     Ambulatory Referral to Psychiatry    7. Genital herpes simplex, unspecified site  -     valACYclovir (VALTREX) 500 MG tablet; Take 1 tablet by mouth 2 (Two) Times a Day.  Dispense: 6 tablet; Refill: 7    8. Immunization due  -     Fluzone >6mos (1861-1804)    9. Encounter for contraceptive management,  unspecified type  -     Ambulatory Referral to Obstetrics / Gynecology    Other orders  -     multivitamin with minerals (Multivitamin Adult) tablet tablet; Take 1 tablet by mouth Daily for 360 days.  Dispense: 90 tablet; Refill: 3        All chronic conditions have been addressed and treated by the practice or other specialists. Medications have been reconciled and refilled as appropriate. Reiterated compliance and timely follow up appointments. Side effects of all new and old medications reviewed with the patient and patient willing to accept all risks involved. Advised RTO if no improvement or worsening of symptoms or if any new complaints arise. Patient advised to follow up with clinic or call after diagnostic tests, if patient does not hear from office 3 days after the test completion.          Follow Up     Return in about 2 months (around 3/15/2025) for Next scheduled follow up.  Patient was given instructions and counseling regarding her condition or for health maintenance advice. Please see specific information pulled into the AVS if appropriate.

## 2025-01-15 NOTE — ASSESSMENT & PLAN NOTE
Instructed patient to use a frozen water bottle and roll the soles of her feet for 15 minutes in the evening.  Counseled patient to not wake until the feet or soles get totally numb, patient voiced understanding.  Also instructed patient to change her shoes and purchase shoes that have more cushioning.  Alternatively patient may also get fitted for insoles as needed.  Discussed physical therapy referral as well.  Patient to return to clinic if lack of resolution plantar fasciitis.

## 2025-01-16 ENCOUNTER — TELEPHONE (OUTPATIENT)
Dept: FAMILY MEDICINE CLINIC | Facility: CLINIC | Age: 30
End: 2025-01-16
Payer: COMMERCIAL

## 2025-01-16 DIAGNOSIS — T78.40XD ALLERGY, SUBSEQUENT ENCOUNTER: Primary | ICD-10-CM

## 2025-01-16 LAB
BUN SERPL-MCNC: 11 MG/DL (ref 6–20)
BUN/CREAT SERPL: 16 (ref 9–23)
CALCIUM SERPL-MCNC: 9.5 MG/DL (ref 8.7–10.2)
CHLORIDE SERPL-SCNC: 103 MMOL/L (ref 96–106)
CO2 SERPL-SCNC: 26 MMOL/L (ref 20–29)
CREAT SERPL-MCNC: 0.69 MG/DL (ref 0.57–1)
EGFRCR SERPLBLD CKD-EPI 2021: 121 ML/MIN/1.73
GLUCOSE SERPL-MCNC: 85 MG/DL (ref 70–99)
POTASSIUM SERPL-SCNC: 4.1 MMOL/L (ref 3.5–5.2)
SODIUM SERPL-SCNC: 140 MMOL/L (ref 134–144)

## 2025-01-16 NOTE — TELEPHONE ENCOUNTER
Caller: Mary Sun    Relationship: Self    Best call back number: [phone]     What is the medical concern/diagnosis: PEANUTS AND TREE NUT ALLERGIES, USES EPI PEN    What specialty or service is being requested: ALLERGIST    What is the provider, practice or medical service name:     What is the office location:     What is the office phone number:     Any additional details: PATIENT JUST MOVED HERE AND NEEDS AN ALLERGIST.  IS OUT OF EPIPEN.  FORGOT TO ASK AT YESTERDAYS APPOINTMENT.

## 2025-01-17 PROBLEM — T78.40XA ALLERGIES: Status: ACTIVE | Noted: 2025-01-17

## 2025-01-17 RX ORDER — EPINEPHRINE 0.3 MG/.3ML
0.3 INJECTION SUBCUTANEOUS ONCE
Qty: 2 EACH | Refills: 0 | Status: SHIPPED | OUTPATIENT
Start: 2025-01-17 | End: 2025-01-17

## 2025-01-29 ENCOUNTER — TREATMENT (OUTPATIENT)
Dept: PHYSICAL THERAPY | Facility: CLINIC | Age: 30
End: 2025-01-29
Payer: COMMERCIAL

## 2025-01-29 DIAGNOSIS — M72.2 PLANTAR FASCIITIS, BILATERAL: ICD-10-CM

## 2025-01-29 DIAGNOSIS — M79.671 PAIN IN BOTH FEET: Primary | ICD-10-CM

## 2025-01-29 DIAGNOSIS — M79.672 PAIN IN BOTH FEET: Primary | ICD-10-CM

## 2025-01-29 PROCEDURE — 97530 THERAPEUTIC ACTIVITIES: CPT | Performed by: PHYSICAL THERAPIST

## 2025-01-29 PROCEDURE — 97161 PT EVAL LOW COMPLEX 20 MIN: CPT | Performed by: PHYSICAL THERAPIST

## 2025-01-29 PROCEDURE — 97110 THERAPEUTIC EXERCISES: CPT | Performed by: PHYSICAL THERAPIST

## 2025-01-29 NOTE — PROGRESS NOTES
Physical Therapy Initial Evaluation and Plan of Care  [facility]  [address], [address], [place] [id number]    Patient: Mary Sun   : 1995  Diagnosis/ICD-10 Code:  Pain in both feet [M79.671, M79.672]  Referring practitioner: John Cardoza*  Date: 2025    Subjective Evaluation    History of Present Illness  Onset date: about 3 months ago.  Mechanism of injury: Patient reports (B) plantar foot pain about 3 months ago.  The pain was beginning to travel to her ankles as well but reports she did recently get new shoes with more cushion (was wearing Crocs prior) and this is beginning her improve her symptoms.  She has done a few frozen water bottle rolls.  She reports pain with first few steps in the morning, standing up following prolonged sitting, prolonged standing, and prolonged walking. She reports being a toe walker previously.       Patient Occupation: Works at a [employer] - a lot of standing Quality of life: good    Pain  Current pain ratin  At best pain ratin  Pain scale at highest: 3-4/10 first thing in AM, 8/10 prolonged standing.  Location: various aspects of plantar aspect of (B) feet, (L) > (R)  Relieving factors: ice  Aggravating factors: standing, ambulation and squatting  Progression: improved    Diagnostic Tests  No diagnostic tests performed    Patient Goals  Patient goals for therapy: decreased pain             Subjective Questionnaire: FAAM : 63/84 = 75%     Objective          Tenderness     Additional Tenderness Details  (L) plantar and posterior heel tenderness, moderate  (R) plantar arch tenderness, minimal    Active Range of Motion   Left Ankle/Foot   Plantar flexion: 68 degrees   Inversion: 18 degrees   Eversion: 0 degrees     Right Ankle/Foot   Plantar flexion: 74 degrees   Inversion: 31 degrees   Eversion: 9 degrees     Additional Active Range of Motion Details  (R) ankle DF AROM lacking 5 degrees   (L) ankle DF AROM lacking 7  degrees    Strength/Myotome Testing     Left Ankle/Foot   Dorsiflexion: 4+  Plantar flexion: 4+  Inversion: 4  Eversion: 4    Right Ankle/Foot   Dorsiflexion: 4+  Plantar flexion: 4+  Inversion: 4  Eversion: 4          Assessment & Plan       Assessment  Impairments: abnormal or restricted ROM, activity intolerance, impaired physical strength, lacks appropriate home exercise program and pain with function   Functional limitations: walking, uncomfortable because of pain, standing and stooping   Assessment details: Patient is a 29 y.o female who reports onset of (B) plantar foot pain about 3 months ago.  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  She has since switched her shoes to a pair with more cushion with some symptom improvement.  She has also been instructed in frozen water bottle rolls.  She reports (L) > (R) plantar foot symptoms rated 0-8/10, worst first thing in the morning, standing up after prolonged sitting, or following prolonged standing or walking.  She exhibits tenderness (B) plantar heels and plantar fascia, decreased ankle AROM in multiple planes, and decreased (B) ankle strength.  Her FAAM score is 75% indicating a moderate perceived level of functional limitation.  She will benefit from skilled PT services to address these deficits and assist patient in painfree return to all ADLs and prolonged standing/walking.  Prognosis: good    Goals  Plan Goals: STGs: to be met in 4 weeks  1. Patient will be independent with initial HEP  2. Patient will report improved (B) plantar foot symptoms 0-3/10 for improved activity tolerance  3. Patient will report 50+% improvement in (B) plantar foot symptoms first thing in the morning  4. Patient will demonstrate (B) ankle AROM WNL all planes for decreased excessive or aberrhant foot stresses in weightbearing    LTGs: to be met in 8 weeks  1. Patient will be independent with  progressed HEP  2. Patient will report resolution of (B) plantar foot symptoms  3. Patient will report consistently being painfree with first steps in the morning and after prolonged standing and walking at work  4. Patient will have improved FAAM score >/= 85% for subjective evidence of functional improvement    Plan  Therapy options: will be seen for skilled therapy services  Planned modality interventions: cryotherapy and thermotherapy (hydrocollator packs)  Planned therapy interventions: balance/weight-bearing training, home exercise program, joint mobilization, manual therapy, neuromuscular re-education, soft tissue mobilization, strengthening, stretching and therapeutic activities  Frequency: 1x week  Duration in weeks: 12  Treatment plan discussed with: patient        TIMED:  Manual Therapy:         mins  99616;  Therapeutic Exercise:    17     mins  04563;     Neuromuscular James:        mins  86087;    Therapeutic Activity:     11     mins  16474;     Gait Training:           mins  71785;     Ultrasound:          mins  40064;    Work Conditioning             mins 27655    UNTIMED:  Electrical Stimulation:         mins  44972 (MC );  Dry Needling          mins 03836    Timed Treatment:   28   mins   Total Treatment:     51   mins    PT SIGNATURE: Lyssa Mcpherson PT, DPT, OCS  Electronically signed by: Lyssa Mcpherson PT, 01/29/25, 6:56 AM EST  [place] License #[id number]     DATE TREATMENT INITIATED: 1/29/2025    Initial Certification  Certification Period: 1/29/2025 thru 4/28/2025  I certify that the therapy services are furnished while this patient is under my care.  The services outlined above are required by this patient, and will be reviewed every 90 days.     PHYSICIAN: John Cardoza, APRN  [id number]                                          DATE:     Please sign and return via fax to [phone]. Thank you, [facility].

## 2025-03-19 ENCOUNTER — OFFICE VISIT (OUTPATIENT)
Dept: FAMILY MEDICINE CLINIC | Facility: CLINIC | Age: 30
End: 2025-03-19
Payer: COMMERCIAL

## 2025-03-19 VITALS
BODY MASS INDEX: 34.66 KG/M2 | SYSTOLIC BLOOD PRESSURE: 138 MMHG | HEART RATE: 104 BPM | RESPIRATION RATE: 16 BRPM | OXYGEN SATURATION: 98 % | DIASTOLIC BLOOD PRESSURE: 84 MMHG | WEIGHT: 208 LBS | TEMPERATURE: 97.8 F | HEIGHT: 65 IN

## 2025-03-19 DIAGNOSIS — Z00.00 ANNUAL PHYSICAL EXAM: Primary | ICD-10-CM

## 2025-03-19 DIAGNOSIS — F12.10 MILD TETRAHYDROCANNABINOL (THC) ABUSE: ICD-10-CM

## 2025-03-19 DIAGNOSIS — M72.2 BILATERAL PLANTAR FASCIITIS: ICD-10-CM

## 2025-03-19 PROBLEM — Z76.89 ENCOUNTER TO ESTABLISH CARE: Status: RESOLVED | Noted: 2025-01-15 | Resolved: 2025-03-19

## 2025-03-19 PROBLEM — Z23 IMMUNIZATION DUE: Status: RESOLVED | Noted: 2025-01-15 | Resolved: 2025-03-19

## 2025-03-19 PROBLEM — E87.6 HYPOKALEMIA: Status: RESOLVED | Noted: 2025-01-15 | Resolved: 2025-03-19

## 2025-03-19 RX ORDER — HYDROXYZINE HYDROCHLORIDE 25 MG/1
50 TABLET, FILM COATED ORAL NIGHTLY PRN
Qty: 60 TABLET | Refills: 3 | Status: SHIPPED | OUTPATIENT
Start: 2025-03-19

## 2025-03-19 RX ORDER — EPINEPHRINE 0.3 MG/.3ML
INJECTION SUBCUTANEOUS
COMMUNITY
Start: 2025-02-04

## 2025-03-19 NOTE — PROGRESS NOTES
"Chief Complaint  feet pain (Still present; no change.) and redness (1 week ago; redness around chest area; was outside with the warmer weather.)    Subjective        Mary Sun presents to [facility]  History of Present Illness  29-year-old -American female here for follow-up on obesity and for annual physical.    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  Patient desires weight loss advise.    Patient also complaining of some itching in her upper torso in the sun exposed region after sun exposure for couple hours recently has been improving since then.  Counseled patient to use sunscreen while in the sun at all times.    Patient states she status post dermatology referral already.  Patient states bilateral plantar fasciitis has not improved status post physical therapy,  I have discussed podiatry referral today, patient was understanding.    Patient states she has cut back on her THC use significantly after my counseling on her initial visit with me.      Objective   Vital Signs:  /84 (BP Location: Left arm, Patient Position: Sitting, Cuff Size: Adult)   Pulse 104   Temp 97.8 °F (36.6 °C) (Temporal)   Resp 16   Ht 165.1 cm (65\")   Wt 94.3 kg (208 lb)   SpO2 98%   BMI 34.61 kg/m²   Estimated body mass index is 34.61 kg/m² as calculated from the following:    Height as of this encounter: 165.1 cm (65\").    Weight as of this encounter: 94.3 kg (208 lb).               Physical Exam  Constitutional:       Appearance: Normal appearance.   HENT:      Head: Normocephalic and atraumatic.   Eyes:      Conjunctiva/sclera: Conjunctivae normal.   Cardiovascular:      Rate and Rhythm: Normal rate and regular rhythm.      Heart sounds: Normal heart sounds.   Pulmonary:      Effort: Pulmonary effort is normal.      Breath sounds: Normal breath sounds.   Abdominal:      General: Bowel sounds are normal.      Palpations: Abdomen is " soft.      Comments: Non-tender   Skin:     General: Skin is warm.   Neurological:      General: No focal deficit present.      Mental Status: She is alert and oriented to person, place, and time.   Psychiatric:         Mood and Affect: Mood normal.         Behavior: Behavior normal.        Result Review :    The following data was reviewed by: PB Terry on 03/19/2025:  CMP          11/26/2024    11:49 1/15/2025    14:42   CMP   Glucose 108  85    BUN 12  11    Creatinine 0.70  0.69    EGFR 121.7  121    Sodium 138  140    Potassium 3.3  4.1    Chloride 101  103    Calcium 8.9  9.5    Total Protein 7.1     Albumin 4.4     Globulin 2.7     Total Bilirubin 0.3     Alkaline Phosphatase 75     AST (SGOT) 20     ALT (SGPT) 21     Albumin/Globulin Ratio 1.6     BUN/Creatinine Ratio 17.1  16    Anion Gap 10.7       CBC          11/26/2024    11:49   CBC   WBC 4.83    RBC 3.97    Hemoglobin 12.2    Hematocrit 35.2    MCV 88.7    MCH 30.7    MCHC 34.7    RDW 11.8    Platelets 230                             Assessment and Plan     Diagnoses and all orders for this visit:    1. Annual physical exam (Primary)  Assessment & Plan:  Counseling was provided on nutrition, physical activity, development, and injury prevention, dental health, and safe sex practices patient verbalizes understanding no additional questions were asked.           2. Mild tetrahydrocannabinol (THC) abuse: needs counseling  Assessment & Plan:  Encourage patient to continue to taper off THC use, patient voiced understanding.      3. Bilateral plantar fasciitis  Assessment & Plan:  Patient agreeable to following with podiatry.    Orders:  -     Ambulatory Referral to Podiatry    Other orders  -     hydrOXYzine (ATARAX) 25 MG tablet; Take 2 tablets by mouth At Night As Needed for Itching, Allergies or Anxiety.  Dispense: 60 tablet; Refill: 3      All chronic conditions have been addressed and treated by the practice or other specialists.  Medications have been reconciled and refilled as appropriate. Reiterated compliance and timely follow up appointments. Side effects of all new and old medications reviewed with the patient and patient willing to accept all risks involved. Advised RTO if no improvement or worsening of symptoms or if any new complaints arise. Patient advised to follow up with clinic or call after diagnostic tests, if patient does not hear from office 3 days after the test completion.            Follow Up     Return in about 3 months (around 6/19/2025) for Next scheduled follow up.  Patient was given instructions and counseling regarding her condition or for health maintenance advice. Please see specific information pulled into the AVS if appropriate.

## 2025-03-27 ENCOUNTER — OFFICE VISIT (OUTPATIENT)
Dept: OBSTETRICS AND GYNECOLOGY | Age: 30
End: 2025-03-27
Payer: COMMERCIAL

## 2025-03-27 VITALS
HEIGHT: 65 IN | DIASTOLIC BLOOD PRESSURE: 74 MMHG | SYSTOLIC BLOOD PRESSURE: 126 MMHG | WEIGHT: 208 LBS | BODY MASS INDEX: 34.66 KG/M2

## 2025-03-27 DIAGNOSIS — R10.2 PELVIC PAIN: ICD-10-CM

## 2025-03-27 DIAGNOSIS — Z12.4 ENCOUNTER FOR PAPANICOLAOU SMEAR FOR CERVICAL CANCER SCREENING: ICD-10-CM

## 2025-03-27 DIAGNOSIS — Z30.09 ENCOUNTER FOR OTHER GENERAL COUNSELING OR ADVICE ON CONTRACEPTION: Primary | ICD-10-CM

## 2025-03-27 DIAGNOSIS — Z11.3 SCREEN FOR STD (SEXUALLY TRANSMITTED DISEASE): ICD-10-CM

## 2025-03-27 NOTE — PROGRESS NOTES
"Subjective     Chief Complaint   Patient presents with    Gynecologic Exam     New pt , c/o since  she notices tenderness and bloating in lower abdomen.  Would also like to discuss ph balance and diet.         Mary Sun is a 29 y.o.  whose LMP is Patient's last menstrual period was 2025 (approximate). presents to establish care    She wants to get established    She is from [place]  Moved here in 2018 but then left and moved  back here this past fall    She has h/o csection and notes tenderness in low abdomen and feels bloated  Her csection was nearly 5  ya  Pain has been there off an on since then    Pain most recently seems to be better  Worse when she is moving around a lot    Denies bowel issues    Notes a vaginal odor  Does admit to eating a lot of sweets  Not very SA but is open to getting tested for std's    She does want to do a pap today also  Has h/o +HPV but most recent pap was negative    Disc bc  Has done depo in the past  Also had nexplanon  This caused weight gain  Doesn't think she wants to take a pill, knows she is bad at taking a pill    No Additional Complaints Reported    The following portions of the patient's history were reviewed and updated as appropriate:no additional history reviewed, vital signs, allergies, current medications, past medical history, past social history, past surgical history, and problem list      Review of Systems   Genitourinary:positive for vaginal discharge and odor     Objective      /74   Ht 165.1 cm (65\")   Wt 94.3 kg (208 lb)   LMP 2025 (Approximate)   BMI 34.61 kg/m²     Physical Exam    General:   alert, comfortable, and no distress   Heart: Not performed today   Lungs: Not performed today.   Breast: Not performed today   Neck: Not performed today   Abdomen: normal findings: umbilicus normal, symmetric, no masses palpable, soft, non-tender   CVA: Not performed today   Pelvis: External genitalia: normal general " appearance  Vaginal: normal mucosa without prolapse or lesions and discharge, white  Cervix: normal appearance, thin prep PAP obtained, and GC prep obtained  Adnexa: normal bimanual exam  Uterus: normal single, nontender   Extremities: Not performed today   Neurologic: negative   Psychiatric: Normal affect, judgement, and mood       Lab Review   Labs: Urinalysis - with micro    Imaging   No data reviewed    Assessment & Plan     ASSESSMENT  1. Encounter for other general counseling or advice on contraception    2. Screen for STD (sexually transmitted disease)    3. Encounter for Papanicolaou smear for cervical cancer screening    4. Pelvic pain          PLAN  1.   Orders Placed This Encounter   Procedures    NuSwab VG+ - Swab, Vagina    Urine Culture - Urine, Urine, Random Void       2. Nuswab obtained. Pap done. Disc gardasil. HO given. Disc pelvic pain.  Plan pelvic u/s. Possibly scar tissue? Could consider PT to help treat this.     Work on diet changes and low carb/sugar intake in the meantime    Follow up: 4 week(s)    OLI Rosa  3/27/2025

## 2025-03-29 LAB
A VAGINAE DNA VAG QL NAA+PROBE: ABNORMAL SCORE
BACTERIA UR CULT: NORMAL
BACTERIA UR CULT: NORMAL
BVAB2 DNA VAG QL NAA+PROBE: ABNORMAL SCORE
C ALBICANS DNA VAG QL NAA+PROBE: NEGATIVE
C GLABRATA DNA VAG QL NAA+PROBE: NEGATIVE
C TRACH DNA SPEC QL NAA+PROBE: NEGATIVE
MEGA1 DNA VAG QL NAA+PROBE: ABNORMAL SCORE
N GONORRHOEA DNA VAG QL NAA+PROBE: NEGATIVE
T VAGINALIS DNA VAG QL NAA+PROBE: NEGATIVE

## 2025-03-31 ENCOUNTER — OFFICE VISIT (OUTPATIENT)
Age: 30
End: 2025-03-31
Payer: COMMERCIAL

## 2025-03-31 VITALS — HEIGHT: 65 IN | WEIGHT: 208 LBS | BODY MASS INDEX: 34.66 KG/M2

## 2025-03-31 DIAGNOSIS — M72.2 PLANTAR FASCIITIS: ICD-10-CM

## 2025-03-31 DIAGNOSIS — M79.671 BILATERAL FOOT PAIN: Primary | ICD-10-CM

## 2025-03-31 DIAGNOSIS — M24.571 EQUINUS CONTRACTURE OF RIGHT ANKLE: ICD-10-CM

## 2025-03-31 DIAGNOSIS — M24.572 EQUINUS CONTRACTURE OF LEFT ANKLE: ICD-10-CM

## 2025-03-31 DIAGNOSIS — M79.672 BILATERAL FOOT PAIN: Primary | ICD-10-CM

## 2025-03-31 PROCEDURE — 99203 OFFICE O/P NEW LOW 30 MIN: CPT | Performed by: PODIATRIST

## 2025-03-31 RX ORDER — MELOXICAM 15 MG/1
15 TABLET ORAL DAILY
Qty: 30 TABLET | Refills: 0 | Status: SHIPPED | OUTPATIENT
Start: 2025-03-31

## 2025-03-31 NOTE — PROGRESS NOTES
2025  Foot and Ankle Surgery - New Patient   Provider: Dr. Jaime Riggs DPM  Location: [facility]    Subjective:  Mary Sun is a 29 y.o. female.     Chief Complaint   Patient presents with    Left Foot - Pain     Entire bottom of both feet - 5 months - went to PT was shown stretching exercises    Right Foot - Pain     Entire bottom of both feet - 5 months - went to PT was shown stretching exercises    Initial Evaluation     PCP: John Cardoza APRN  Last PCP Visit: 3/19/25       29-year-old female complaining of bilateral foot pain.  Pain worse with first step after working at the end of the day as well as first up in the morning after sleep.  Patient has tried over-the-counter inserts, Dr. Ly's, with minimal relief.  Patient has not taken any medication for the problem this time.  Patient has been doing physical therapy but states no improvement.  Patient states most exercises are involved stretching the foot muscle some cells and not the calf.    Pain       Allergies   Allergen Reactions    Nuts Anaphylaxis and Hives     Tree nuts    Amoxil [Amoxicillin] Hives    Flagyl [Metronidazole] Hives    Penicillins Hives       Past Medical History:   Diagnosis Date    Anemia     Anemia, unspecified 2020    borderline low platelets 2020     delivery delivered: failure to progress 2020    Eczema of both hands     Encounter to establish care 01/15/2025    Herpes     Hypokalemia 01/15/2025    Positive GBS test- PNC allergic, clinda resistant, needs Vanc in labor 2020    Rh negative, antepartum 2020       Past Surgical History:   Procedure Laterality Date     SECTION N/A 2020    Procedure:  SECTION PRIMARY;  Surgeon: Yasir Bernal MD;  Location: [facility] LABOR DELIVERY;  Service: Obstetrics/Gynecology;  Laterality: N/A;       Family History   Problem Relation Age of Onset    Cancer Mother     Hypertension Mother      "Diabetes Mother     Hypertension Father     Diabetes Father     No Known Problems Sister     No Known Problems Maternal Grandmother     No Known Problems Maternal Grandfather     Schizophrenia Paternal Grandmother     Birth defects Paternal Grandfather        Social History     Socioeconomic History    Marital status: Single    Number of children: 0   Tobacco Use    Smoking status: Former     Passive exposure: Past    Smokeless tobacco: Never    Tobacco comments:     occasional use    Vaping Use    Vaping status: Never Used   Substance and Sexual Activity    Alcohol use: No     Comment: occasional    Drug use: No    Sexual activity: Yes     Partners: Male     Birth control/protection: None        Current Outpatient Medications on File Prior to Visit   Medication Sig Dispense Refill    betamethasone valerate (VALISONE) 0.1 % ointment       clobetasol (TEMOVATE) 0.05 % external solution       Dupilumab (Dupixent) 300 MG/2ML solution auto-injector injection       EPINEPHrine (EPIPEN) 0.3 MG/0.3ML solution auto-injector injection       hydrocortisone 2.5 % cream       hydrOXYzine (ATARAX) 25 MG tablet Take 2 tablets by mouth At Night As Needed for Itching, Allergies or Anxiety. 60 tablet 3    ketoconazole (NIZORAL) 2 % shampoo       multivitamin with minerals (Multivitamin Adult) tablet tablet Take 1 tablet by mouth Daily for 360 days. 90 tablet 3    triamcinolone (KENALOG) 0.1 % cream       valACYclovir (VALTREX) 500 MG tablet Take 1 tablet by mouth 2 (Two) Times a Day. 6 tablet 7    [DISCONTINUED] Diclofenac Sodium (VOLTAREN) 1 % gel gel Apply 4 g topically to the appropriate area as directed 2 (Two) Times a Day As Needed (MSK pain) for up to 90 days. 100 g 2     No current facility-administered medications on file prior to visit.         Objective   Ht 165.1 cm (65\")   Wt 94.3 kg (208 lb)   LMP 03/05/2025 (Approximate)   BMI 34.61 kg/m²     Foot/Ankle Exam    GENERAL  Appearance:  appears stated age  Orientation:  " AAOx3  Affect:  appropriate  Gait:  unimpaired  Assistance:  independent  Right shoe gear: casual shoe  Left shoe gear: casual shoe    VASCULAR     Right Foot Vascularity   Dorsalis pedis:  2+  Posterior tibial:  2+  Skin temperature:  warm  Edema gradin+  CFT:  < 3 seconds  Pedal hair growth:  Present  Varicosities:  none     Left Foot Vascularity   Dorsalis pedis:  2+  Posterior tibial:  2+  Skin temperature:  warm  Edema gradin+  CFT:  < 3 seconds  Pedal hair growth:  Present  Varicosities:  none     NEUROLOGIC     Right Foot Neurologic   Normal sensation    Light touch sensation: normal  Vibratory sensation: normal  Hot/Cold sensation: normal  Normal reflexes    Achilles reflex:  2+  Babinski reflex:  2+     Left Foot Neurologic   Normal sensation    Light touch sensation: normal  Vibratory sensation: normal  Hot/Cold sensation:  normal  Normal reflexes    Achilles reflex:  2+  Babinski reflex:  2+    MUSCULOSKELETAL     Right Foot Musculoskeletal   Ecchymosis:  none  Tenderness:  plantar fascia tenderness and retrocalcaneal bursa tenderness       Left Foot Musculoskeletal   Ecchymosis:  none  Tenderness:  plantar fascia tenderness and retrocalcaneal bursa tenderness    MUSCLE STRENGTH     Right Foot Muscle Strength   Normal strength    Foot dorsiflexion:  5  Foot plantar flexion:  5  Foot inversion:  5  Foot eversion:  5     Left Foot Muscle Strength   Normal strength    Foot dorsiflexion:  5  Foot plantar flexion:  5  Foot inversion:  5  Foot eversion:  5    RANGE OF MOTION     Right Foot Range of Motion   Foot and ankle ROM within normal limits    Ankle dorsiflexion: decreased      Left Foot Range of Motion   Foot and ankle ROM within normal limits    Ankle dorsiflexion: decreased    DERMATOLOGIC      Right Foot Dermatologic   Nails  1.  Normal.  2.  Normal.  3.  Normal.  4.  Normal.  5.  Normal.     Left Foot Dermatologic   Nails  1.  Normal.  2.  Normal.  3.  Normal.  4.  Normal.  5.   Normal.    Three-view weightbearing bilateral foot x-rays performed today.  Impression: No fracture dislocation noted.  Joint spaces well-maintained.  Mild soft tissue edema to plantar medial arch.  Otherwise unremarkable x-rays    Assessment & Plan   Diagnoses and all orders for this visit:    1. Bilateral foot pain (Primary)  -     XR Foot 3+ View Bilateral; Future    2. Plantar fasciitis    3. Equinus contracture of left ankle    4. Equinus contracture of right ankle    Other orders  -     meloxicam (MOBIC) 15 MG tablet; Take 1 tablet by mouth Daily. Take once daily.  Dispense: 30 tablet; Refill: 0       Diagnosis and treatment options for patient's plantar fasciitis discussed today.  Etiologies of plantar fasciitis discussed including flatfeet, high arches, tight muscles in your calves and heels.  Discussed with patient most will get relief of symptoms with conservative treatment but that surgical options are available.  Discussed conservative and surgical options.  Patient will pursue conservative treatment at this time.    Stretching exercises discussed in detail.  Handout dispensed to patient today.  Patient to perform stretching exercises 2 times daily    Discussed interventions including night splints, LowDye taping, physical therapy referral.      Discussed oral nonsteroidal anti-inflammatories.  We will prescribe meloxicam 15 mg daily for 30 days    Discussed steroid injection to plantar fascia.  We will hold off at this time today    Proper shoe gear discussed with the patient.  Patient needs more supportive athletic shoes.  Inserts discussed with the patient.  We will show patient power step inserts today.    Patient to follow-up in 2 to 3 weeks             Procedures     Adriano Riggs DPM   n/a

## 2025-04-01 ENCOUNTER — TELEPHONE (OUTPATIENT)
Dept: OBSTETRICS AND GYNECOLOGY | Age: 30
End: 2025-04-01
Payer: COMMERCIAL

## 2025-04-01 LAB
CONV .: NORMAL
CYTOLOGIST CVX/VAG CYTO: NORMAL
CYTOLOGY CVX/VAG DOC CYTO: NORMAL
CYTOLOGY CVX/VAG DOC THIN PREP: NORMAL
DX ICD CODE: NORMAL
OTHER STN SPEC: NORMAL
SERVICE CMNT-IMP: NORMAL
STAT OF ADQ CVX/VAG CYTO-IMP: NORMAL

## 2025-04-01 RX ORDER — CLINDAMYCIN HYDROCHLORIDE 300 MG/1
300 CAPSULE ORAL 2 TIMES DAILY
Qty: 14 CAPSULE | Refills: 0 | Status: SHIPPED | OUTPATIENT
Start: 2025-04-01 | End: 2025-04-08

## 2025-04-01 NOTE — TELEPHONE ENCOUNTER
Keri from [facility] called to verify prescription. Prescription in question is for the metronidazole and that the patient has an allergy to it. Please advise

## 2025-04-01 NOTE — TELEPHONE ENCOUNTER
Pt would rather had something different in.  She doesn't really want to take that chance.  Please send in something different.

## 2025-04-02 ENCOUNTER — PATIENT ROUNDING (BHMG ONLY) (OUTPATIENT)
Age: 30
End: 2025-04-02
Payer: COMMERCIAL

## 2025-04-03 ENCOUNTER — TELEPHONE (OUTPATIENT)
Age: 30
End: 2025-04-03
Payer: COMMERCIAL

## 2025-04-03 NOTE — TELEPHONE ENCOUNTER
Called pt to see if she would possibly like to move her new pt appt moved up from 4/28 to today at (4/3) at 1:30pm, pt will be calling the office back she said she had to speak with her boss to see if it would be possible for her to be able to get off work early to be able to make the appt

## 2025-04-10 ENCOUNTER — OFFICE VISIT (OUTPATIENT)
Age: 30
End: 2025-04-10
Payer: COMMERCIAL

## 2025-04-10 VITALS
HEIGHT: 65 IN | BODY MASS INDEX: 34.82 KG/M2 | HEART RATE: 76 BPM | OXYGEN SATURATION: 98 % | DIASTOLIC BLOOD PRESSURE: 69 MMHG | WEIGHT: 209 LBS | SYSTOLIC BLOOD PRESSURE: 109 MMHG

## 2025-04-10 DIAGNOSIS — F12.20 TETRAHYDROCANNABINOL (THC) USE DISORDER, SEVERE, DEPENDENCE: ICD-10-CM

## 2025-04-10 DIAGNOSIS — F40.10 SOCIAL ANXIETY DISORDER: ICD-10-CM

## 2025-04-10 DIAGNOSIS — F33.0 MILD EPISODE OF RECURRENT MAJOR DEPRESSIVE DISORDER: Primary | ICD-10-CM

## 2025-04-10 PROBLEM — F12.10 MILD TETRAHYDROCANNABINOL (THC) ABUSE: Status: RESOLVED | Noted: 2020-04-01 | Resolved: 2025-04-10

## 2025-04-10 NOTE — PROGRESS NOTES
"Subjective   Mary Sun is a 29 y.o. female who presents today for initial evaluation     Chief Complaint: \"A lot of things, mainly my focus.\"    History of Present Illness:   Ms. Mary Sun is a 29-year-old female seen as a new patient to establish care after being referred for further psychiatric evaluation and management.    Patient reports a progressive worsening of various attention/concentration issues experienced over the past 1 to 2 years, stating that she has had increased difficulties managing these symptoms over the past several months leading to a rather significant impact on both her social and occupational functioning.  More specifically, patient reports frequently experiencing \"trouble finding my words\" associated with her mind going blank during routine conversation.  She also endorses persistent issues with short-term memory resulting in frequent forgetfulness and associated difficulty organizing her thoughts and successfully initiating/completing tasks/responsibilities both at home and at work.  She also reports a perceived increase in distractibility resulting in issues staying on task.  Patient denies experiencing similar symptoms prior to the past 1 to 2 years and denies ever being formally evaluated for or diagnosed with ADHD as a child.  She denies ever requiring special education or an IEP at any point during her academic career, and that throughout grade school she was an A+ student with no behavioral issues that disrupted the classroom such as excessive talking at inappropriate times, difficulties remaining seated/still, issues with blurting out answers, or issues waiting her turn in conversation.  She further explains that her grades did begin to drop upon transitioning to middle school, stating that she was bullied rather relentlessly by her peers due to her appearance and issues with eczema resulting in significant withdrawal and avoidance of social situations.  Patient states that " Discharge Summary           Date of Admission:12/6/2021     Date of Discharge: 12/7/2021    Admission Diagnosis: Incisional hernia, recurrent     [id number]     Discharge Diagnosis: Same    Procedures performed: One.  Robotic assisted incisional hernia repair with myocutaneous flap trunk x4    Hospital Course: Patient underwent the above procedure.  He tolerated it well.  He was pain-free.  His epidural was discharged on postoperative day #1.  His Gomes catheter was removed.  He was able to pee.  He did saturate one dressing which was replaced and currently dry.  Dressing changed in PACU, no drainage from wound.  MINDY serosanguineous, 30 over the last 6 hours.  170 cc overnight.  Patient discharged home with MINDY instruction.  He is to follow-up with me as scheduled or when the MINDY is less than 25 cc/day.    Condition on discharge: Good    Follow up:   Dr. Berrios in 7-14 days    Activity:  As tolerated    Discharge Medications: [unfilled]       Dr. Billy Berrios MD, [facility]  [phone]     this bullying did continue throughout high school resulting in the onset of self-esteem issues and low self worth.  Patient also reports that she began to develop a severe fear of embarrassment and/or being perceived negatively by others at this time once again resulting in avoidance of certain social situations and active withdrawal from situations in which the patient felt as if others or perceiving her negatively, laughing at her, or talking bad about her.  Patient does continue to struggle with these symptoms currently and further explains that she did turn to cannabis use and promiscuous sexual activity as a young adolescent and attempts to self-medicate/numb herself for what she now considers depressive and anxious symptoms.  Patient does endorse experiencing recurrent, prolonged periods of depression typically lasting 2 to 4 weeks at a time associated with a low/down mood, persistent anhedonia, significantly low self worth, excessive feelings of guilt, decreased levels of energy/fatigue, difficulties initiating/maintaining sleep, and noticeable psychomotor retardation.  She denies ever experiencing any suicidal ideation, intent or plan and currently denies any active suicidal ideation, intent or plan patient does currently endorse a mildly depressed mood associated with the above depressive symptoms.  Patient does also consider herself a worrier and states that she is always worried excessively about most things on most days.  Patient does feel as if she worries more than most people and endorses significant difficulties controlling/stopping her anxiety and worry.  Patient denies frequently catastrophizing/thinking of worst case scenarios but does endorse near constant feelings of restlessness/feeling on edge associated with intermittent periods of irritability.  The patient does report a history of numerous traumatic events but denies experiencing any recurrent nightmares/flashbacks of these traumas as well  as any increased levels of arousal or hypervigilance.  It is of note that patient does report the excessive, daily use of cannabis beginning around the age of 17 until approximately 1 year ago.  Patient states that at her highest level of consumption she was using upwards of 3.5 g of cannabis per day, and does acknowledge that her increasing cannabis use was very much associated with her poor academic performance when attempting college ultimately resulting in her decision to drop out after 1 year.  Patient reports significantly cutting back on her use of cannabis beginning in March 2024, and currently admits to intermittent use use roughly 1-2 times per month in social situations.    Patient otherwise denies ever experiencing any hypomanic/manic symptoms such as a persistently elevated mood lasting 4 days or more at a time associated with a decreased need for sleep, increased levels of energy, increased goal-directed behaviors, racing thoughts, pressured speech, increased grandiosity or uncharacteristic behaviors such as excessive spending on unnecessary items or increased sex drive/promiscuity.  Patient also denies ever experiencing any auditory, visual, or tactile hallucinations and does not currently appear to be responding to internal stimuli.  She denies any history of generalized paranoia and displays no evidence of delusional thinking.    Past Psychiatric History:  Patient has never been seen by an outpatient psychiatrist.  Patient has never undergone psychotherapy.  Patient has never been prescribed any form of psychiatric medication aside from as needed hydroxyzine prescribed by primary care physician.  The patient denies any history of suicide attempts or self-injurious behavior.  She denies any history of inpatient psychiatric hospitalizations.    Family Psychiatric History:  Patient does report a history of potential bipolar disorder and/or schizophrenia in paternal grandmother and paternal  great-grandmother.    Medical History:  Reviewed via EMR.  Patient reports a history of rather severe eczema.  She also endorses a severe tree nut allergy.  Patient denies any history of seizure disorders or traumatic brain injuries.    Substance Abuse History:  Patient does endorse the rare use of alcohol in social settings.  She does also endorse the routine use of cannabis approximately 1-2 times per month, stating that she has significantly decreased her daily use of cannabis beginning March 2024.  Patient's history of cannabis dependence as detailed above.  She denies use of any other illegal/illicit substance.    Social History:  Patient was born and raised in [place], [place] and reports a relatively good childhood growing up until she began to experience recurrent and rather severe bullying by her peers in middle school as detailed above.  Patient reports that she was primarily raised by her biological mother and that her biological father did have visitation on weekends but was not very present throughout her childhood.  Patient's academic history detailed above is well.  Patient did graduate high school and completed 1 year of college before deciding to drop out due to poor academic performance.  Patient has never been  and has 1 daughter who is 5 years of age.  Patient is not currently in a committed relationship.  Patient is currently living with her mother, stepfather, and half sister here in [facility].  She is currently working at a local [employer] and does have plans to potentially go back to school in the near future.  She denies any history of legal issues or  experience.    The following portions of the patient's history were reviewed and updated as appropriate: allergies, current medications, past family history, past medical history, past social history, past surgical history and problem list.       Past Medical History:  Past Medical History:   Diagnosis Date    Anemia     Anemia,  unspecified 2020    borderline low platelets 2020     delivery delivered: failure to progress 2020    Eczema of both hands     Encounter to establish care 01/15/2025    Herpes     Hypokalemia 01/15/2025    Positive GBS test- PNC allergic, clinda resistant, needs Vanc in labor 2020    Rh negative, antepartum 2020       Social History:  Social History     Socioeconomic History    Marital status: Single    Number of children: 0   Tobacco Use    Smoking status: Former     Passive exposure: Past    Smokeless tobacco: Never    Tobacco comments:     occasional use    Vaping Use    Vaping status: Never Used   Substance and Sexual Activity    Alcohol use: No     Comment: occasional    Drug use: No    Sexual activity: Yes     Partners: Male     Birth control/protection: None       Family History:  Family History   Problem Relation Age of Onset    Cancer Mother     Hypertension Mother     Diabetes Mother     Hypertension Father     Diabetes Father     No Known Problems Sister     No Known Problems Maternal Grandmother     No Known Problems Maternal Grandfather     Schizophrenia Paternal Grandmother     Birth defects Paternal Grandfather        Past Surgical History:  Past Surgical History:   Procedure Laterality Date     SECTION N/A 2020    Procedure:  SECTION PRIMARY;  Surgeon: Yasir Bernal MD;  Location: [facility] LABOR DELIVERY;  Service: Obstetrics/Gynecology;  Laterality: N/A;       Problem List:  Patient Active Problem List   Diagnosis    Eczema    Urticaria due to cold    Bilateral plantar fasciitis    Seborrheic dermatitis    Attention deficit    Genital herpes simplex    Encounter for contraceptive management    Allergies    Annual physical exam    Mild episode of recurrent major depressive disorder    Social anxiety disorder    Tetrahydrocannabinol (THC) use disorder, severe, dependence       Allergy:   Allergies   Allergen Reactions    Nuts  "Anaphylaxis and Hives     Tree nuts    Amoxil [Amoxicillin] Hives    Flagyl [Metronidazole] Hives    Penicillins Hives        Current Medications:   Current Outpatient Medications   Medication Sig Dispense Refill    betamethasone valerate (VALISONE) 0.1 % ointment       clobetasol (TEMOVATE) 0.05 % external solution       Dupilumab (Dupixent) 300 MG/2ML solution auto-injector injection       EPINEPHrine (EPIPEN) 0.3 MG/0.3ML solution auto-injector injection       hydrocortisone 2.5 % cream       hydrOXYzine (ATARAX) 25 MG tablet Take 2 tablets by mouth At Night As Needed for Itching, Allergies or Anxiety. 60 tablet 3    ketoconazole (NIZORAL) 2 % shampoo       meloxicam (MOBIC) 15 MG tablet Take 1 tablet by mouth Daily. Take once daily. 30 tablet 0    multivitamin with minerals (Multivitamin Adult) tablet tablet Take 1 tablet by mouth Daily for 360 days. 90 tablet 3    sertraline (Zoloft) 50 MG tablet Take 1 tablet by mouth Daily for 60 days. 30 tablet 1    triamcinolone (KENALOG) 0.1 % cream       valACYclovir (VALTREX) 500 MG tablet Take 1 tablet by mouth 2 (Two) Times a Day. 6 tablet 7     No current facility-administered medications for this visit.       Review of Symptoms:    Review of Systems   Constitutional:  Positive for activity change and fatigue.   HENT:  Negative for tinnitus.    Eyes:  Negative for visual disturbance.   Cardiovascular:  Negative for chest pain and palpitations.   Endocrine: Negative for cold intolerance and heat intolerance.   Skin:  Negative for rash.   Neurological:  Negative for seizures and confusion.   Psychiatric/Behavioral:  Positive for decreased concentration, sleep disturbance and depressed mood. Negative for hallucinations, self-injury and suicidal ideas. The patient is nervous/anxious.          Physical Exam:   Blood pressure 109/69, pulse 76, height 165.1 cm (65\"), weight 94.8 kg (209 lb), last menstrual period 03/05/2025, SpO2 98%, not currently " breastfeeding.  Appearance: Patient appears documented age, appropriate hygiene and grooming.  Gait, Station, Strength: Normal gait, station and strength.       Mental Status Exam:   Hygiene:   good  Cooperation:  Cooperative  Eye Contact:  Good  Psychomotor Behavior:  Slow  Affect:  Appropriate and Restricted  Mood: sad and depressed  Hopelessness: Denies  Speech:  Normal  Thought Process:  Goal directed and Linear  Thought Content:  Normal  Suicidal:  None  Homicidal:  None  Hallucinations:  None  Delusion:  None  Memory:  Intact  Orientation:  Person, Place, Time, and Situation  Reliability:  good  Insight:  Good  Judgement:  Fair  Impulse Control:  Fair      Lab Results:   Office Visit on 03/27/2025   Component Date Value Ref Range Status    Atopobium Vaginae 03/27/2025 High - 2 (A)  Score Final    BVAB 2 03/27/2025 High - 2 (A)  Score Final    Megasphaera 1 03/27/2025 High - 2 (A)  Score Final    Comment: Calculate total score by adding the 3 individual bacterial  vaginosis (BV) marker scores together.  Total score is  interpreted as follows:  Total score 0-1: Indicates the absence of BV.  Total score   2: Indeterminate for BV. Additional clinical                   data should be evaluated to establish a                   diagnosis.  Total score 3-6: Indicates the presence of BV.      Candida Albicans, TAPAN 03/27/2025 Negative  Negative Final    Elin Glabrata, TAPAN 03/27/2025 Negative  Negative Final    Trichomonas vaginosis 03/27/2025 Negative  Negative Final    Chlamydia trachomatis, TAPAN 03/27/2025 Negative  Negative Final    Neisseria gonorrhoeae, TAPAN 03/27/2025 Negative  Negative Final    Diagnosis 03/27/2025 Comment   Final    NEGATIVE FOR INTRAEPITHELIAL LESION OR MALIGNANCY.    Specimen adequacy: 03/27/2025 Comment   Final    Satisfactory for evaluation. No endocervical component is identified.    Clinician Provided ICD-10: 03/27/2025 Comment   Final    Z11.3    Performed by: 03/27/2025 Comment   Final     Yohana Mckeon, Cytotechnologist ([facility])    . 03/27/2025 .   Final    Note: 03/27/2025 Comment   Final    Comment: The Pap smear is a screening test designed to aid in the detection of  premalignant and malignant conditions of the uterine cervix.  It is not a  diagnostic procedure and should not be used as the sole means of detecting  cervical cancer.  Both false-positive and false-negative reports do occur.      Method: 03/27/2025 Comment   Final    Comment: This liquid based ThinPrep(R) pap test was screened with the  use of an image guided system.      Conv .conv 03/27/2025 Comment   Final    Comment: The HPV DNA reflex criteria were not met with this specimen result  therefore, no HPV testing was performed.      Urine Culture 03/27/2025 Final report   Final    Result 1 03/27/2025 Comment   Final    Comment: Mixed urogenital sachin  10,000-25,000 colony forming units per mL         PHQ-9 Total Score: 10   LUCINDA-7 Total Score: 7     Assessment & Plan    Diagnoses and all orders for this visit:    1. Mild episode of recurrent major depressive disorder (Primary)  -     sertraline (Zoloft) 50 MG tablet; Take 1 tablet by mouth Daily for 60 days.  Dispense: 30 tablet; Refill: 1    2. Social anxiety disorder  -     sertraline (Zoloft) 50 MG tablet; Take 1 tablet by mouth Daily for 60 days.  Dispense: 30 tablet; Refill: 1    3. Tetrahydrocannabinol (THC) use disorder, severe, dependence       Ms. Mary Sun is a 29-year-old female seen as a new patient to establish care after being referred for further psychiatric evaluation and management.    Upon today's evaluation patient reports and displays numerous signs/symptoms most consistent with that of major depressive disorder, social anxiety disorder, and cannabis/THC use disorder.  As detailed above patient has experienced the persistence and more recently the progressive worsening of numerous attention/concentration difficulties over the past several years which have  began to negatively impact both the patient's social and occupational functioning.  The patient does endorse a fairly recent onset of these symptoms stating that she did not significantly suffer from attention/concentration issues prior to the past 2 years.  It is of note that the patient has used increasingly excessive amounts of cannabis on a daily basis for approximately 10 years before significantly decreasing her use beginning in March 2024.  She does admit to ongoing use of cannabis on an intermittent basis while in social situations stating that she does use roughly 1-2 times per month, which is a rather significant decrease in use when compared to her reports of using upwards of 3.5 g/day in the past.  It was discussed in great detail with the patient at today's visit that the chronic, excessive use of cannabis has been associated with numerous neurologic/neuropsychiatric changes especially when used as a child/adolescent during active brain development.  It is unclear whether or not patient's past use of cannabis has contributed in any way to the persistence and severity of her current symptoms, but I do feel as if her pattern pattern of cannabis use as a form of self-medication has very likely contributed to the patient's lack of healthy/appropriate coping mechanisms to address underlying depressive and anxious symptoms.  I do also recommend continuing to decrease the patient's frequency of cannabis use with hopes of complete abstinence as ongoing cannabis use can absolutely contribute to/exacerbate patient's ongoing issues with attention/concentration and overall focus.  While the patient's clinical picture is complicated by her comorbid cannabis use disorder, I do believe that he patient has long suffered from a combination of depressive and anxious symptoms since early adolescence and has attempted to manage/numb these negative symptoms with excessive cannabis use and promiscuous sexual behaviors which  unfortunately have contributed to the persistence of her symptoms.  The patient has began to address these maladaptive behaviors and as such has also experienced a perceived worsening of her underlying psychiatric symptoms as she has undergone most of her adult life masking these symptoms as detailed above.  I do feel as if patient would benefit from the initiation of an SSRI/SNRI in combination with routine psychotherapy at this time.  Patient is agreeable to initiating sertraline 50 mg p.o. once daily and does request a referral for in person psychotherapy.  Patient will be referred for psychotherapy at today's visit and a prescription for sertraline will be sent to her pharmacy.  Patient will be seen again in approximately 4 weeks for reassessment.  She voices understanding of this and is agreeable to today's plan.    Medications:  -Initiate sertraline 50 mg p.o. once daily for management of major depressive disorder and social anxiety disorder.    TREATMENT PLAN - SHORT AND LONG-TERM GOALS:   -Continue supportive psychotherapy efforts and medications as indicated. Treatment and medication options discussed during today's visit.   -Patient acknowledged and verbally consented to continue with current treatment plan and was educated on the importance of compliance with treatment and follow-up appointments.    SUMMARY/EDUCATION/DISCUSSION:  -Pt was given appropriate time to ask questions and concerns were addressed. A thorough discussion was had that included review of disease process, need for continued monitoring and additional treatment options including use of pharmacological and non-pharmacological approaches to care, decisions were made and agreed upon by patient and provider.   -Discussed medication options and treatment plan of prescribed medication as well as the risks, benefits, and side effects including potential falls, possible impaired driving and metabolic adversities among others; patient  acknowledged and provided verbal consent.   -Patient has been educated regarding multimodal approach with healthy nutrition, healthy sleep, regular physical activity, social activities, counseling, and medications.  -Please call the office at [phone] within normal business hours (Monday-Friday, 8:00 AM - 4:30 PM) with any worsening of symptoms or onset of intolerable side effects. Please ask to leave a message with office staff.  Please allow up to 24-48 hours for response to a patient call/question/refill request.  -Safety plan has been established and discussed in detail with the patient, who is agreeable to contact support system and/or call 911 or go to the nearest ER should he/she/they have any thoughts of harm to self or others.    MEDS ORDERED DURING VISIT:  New Medications Ordered This Visit   Medications    sertraline (Zoloft) 50 MG tablet     Sig: Take 1 tablet by mouth Daily for 60 days.     Dispense:  30 tablet     Refill:  1       FOLLOW UP:  Return in about 4 weeks (around 5/8/2025) for Next scheduled follow up.      Jayce Fenton DO    This document has been electronically signed by Jayce Fenton DO  April 10, 2025 10:03 EDT    Part of this note may be an electronic transcription/translation of spoken language to printed text using the Dragon Dictation System. Some of the data in this electronic note has been brought forward from a previous encounter, any necessary changes have been made, it has been reviewed by this provider, and it is accurate.

## 2025-04-24 ENCOUNTER — OFFICE VISIT (OUTPATIENT)
Dept: OBSTETRICS AND GYNECOLOGY | Age: 30
End: 2025-04-24
Payer: COMMERCIAL

## 2025-04-24 VITALS
BODY MASS INDEX: 34.99 KG/M2 | DIASTOLIC BLOOD PRESSURE: 70 MMHG | WEIGHT: 210 LBS | SYSTOLIC BLOOD PRESSURE: 114 MMHG | HEIGHT: 65 IN

## 2025-04-24 DIAGNOSIS — R10.2 PELVIC PAIN: Primary | ICD-10-CM

## 2025-04-24 DIAGNOSIS — Z98.891 HISTORY OF CESAREAN SECTION: ICD-10-CM

## 2025-04-24 DIAGNOSIS — N83.202 LEFT OVARIAN CYST: ICD-10-CM

## 2025-04-24 PROCEDURE — 99213 OFFICE O/P EST LOW 20 MIN: CPT | Performed by: PHYSICIAN ASSISTANT

## 2025-04-24 NOTE — PROGRESS NOTES
"Subjective     Chief Complaint   Patient presents with    Annual Exam     Follow up from 2025 for gyn ultrasound, for pelvic pain.       Mary Sun is a 29 y.o.  whose LMP is Patient's last menstrual period was 2025 (exact date). presents for pelvic u/s    I saw Mary earlier this month for pelvic pain  She has noted this since her csection 5 ya  She notes bloating  Also has urinary frequency  Her urine culture was negative for infection  Urine Culture - Urine, Urine, Random Void (2025 15:20)   She was dxed with BV but had otherwise negative vaginal swab  Urine Culture - Urine, Urine, Random Void (2025 15:20)   She is here today for pelvic u/s      No Additional Complaints Reported    The following portions of the patient's history were reviewed and updated as appropriate:no additional history reviewed, vital signs, allergies, current medications, past medical history, past social history, past surgical history, and problem list      Review of Systems   Genitourinary:positive for pelvic  and frequency     Objective      /70   Ht 165.1 cm (65\")   Wt 95.3 kg (210 lb)   LMP 2025 (Exact Date)   BMI 34.95 kg/m²     Physical Exam    General:   alert, comfortable, and no distress   Heart: Not performed today   Lungs: Not performed today.   Breast: Not performed today   Neck: Not performed today   Abdomen: Not performed today   CVA: Not performed today   Pelvis: Not performed today   Extremities: Not performed today   Neurologic: negative   Psychiatric: Normal affect, judgement, and mood       Lab Review   Labs: Urine culture, colony count, sensitivity    Imaging   Ultrasound - Pelvic Vaginal  Impression: ** Preliminary Reading ** This report has not been finalized by a physician      1.  Uterus: Normal size, with uterine volume of 90 ml, and with uterine dimensions 8.5 x 4 x 4 cm     2.  Endometrium: Normal non-menopausal thickness and 8 mm     3.  Myometrium: Normal " homogenous texture     4.  Ovaries  Left:    Mostly unremarkable, with ovarian volume: 29 ml , and isoechoic, solid structure seen that measures 3.3 x 2.7 x 2.9 cm  Right:  Normal/unremarkable and with ovarian volume: 7 ml      Relevant comparison data: No relevant comparison data     Assessment & Plan     ASSESSMENT  1. Pelvic pain    2. History of  section    3. Left ovarian cyst          PLAN  1.   Orders Placed This Encounter   Procedures    Ambulatory Referral to Physical Therapy for Evaluation & Treatment       2. U/s reveals FF. Possibly contributing to her symptoms. Also note a left ovarian cyst that appears complex. Plan rpt pelvic u/s in 1 month    3. Disc tx options for pelvic pain, she is agreeable to trying PT. I'll place referral for her.     Follow up: 1 month(s)    OLI Rosa  2025

## 2025-04-28 ENCOUNTER — OFFICE VISIT (OUTPATIENT)
Age: 30
End: 2025-04-28
Payer: COMMERCIAL

## 2025-04-28 VITALS — WEIGHT: 210 LBS | HEIGHT: 65 IN | BODY MASS INDEX: 34.99 KG/M2 | RESPIRATION RATE: 20 BRPM

## 2025-04-28 DIAGNOSIS — M24.572 EQUINUS CONTRACTURE OF LEFT ANKLE: ICD-10-CM

## 2025-04-28 DIAGNOSIS — M72.2 PLANTAR FASCIITIS: ICD-10-CM

## 2025-04-28 DIAGNOSIS — M79.671 BILATERAL FOOT PAIN: Primary | ICD-10-CM

## 2025-04-28 DIAGNOSIS — M79.672 BILATERAL FOOT PAIN: Primary | ICD-10-CM

## 2025-04-28 DIAGNOSIS — M24.571 EQUINUS CONTRACTURE OF RIGHT ANKLE: ICD-10-CM

## 2025-04-28 RX ORDER — MELOXICAM 15 MG/1
15 TABLET ORAL DAILY
Qty: 30 TABLET | Refills: 0 | Status: SHIPPED | OUTPATIENT
Start: 2025-04-28

## 2025-04-28 NOTE — PROGRESS NOTES
04/28/2025  Foot and Ankle Surgery - Established Patient/Follow-up  Provider: Dr. Jaime Riggs DPM  Location: [facility]    Subjective:  Mary Sun is a 29 y.o. female following up for bilateral plantar fasciitis, ankle equinus.  Patient has been doing stretching exercises twice daily.  Patient has purchased power step inserts but has yet to receive her Hoka's.  Patient states she feels about 60% better.  Patient is still taking the meloxicam daily.  Patient has also been using ice water bottle for massage and therapy.  No other new complaints at this time.    Chief Complaint   Patient presents with    Left Foot - Pain, Follow-up     Entire bottom of both feet - 5 months - went to PT was shown stretching exercises    Right Foot - Pain, Follow-up     Entire bottom of both feet - 5 months - went to PT was shown stretching exercises       Allergies   Allergen Reactions    Nuts Anaphylaxis and Hives     Tree nuts    Amoxil [Amoxicillin] Hives    Flagyl [Metronidazole] Hives    Penicillins Hives       Current Outpatient Medications on File Prior to Visit   Medication Sig Dispense Refill    betamethasone valerate (VALISONE) 0.1 % ointment       clobetasol (TEMOVATE) 0.05 % external solution       Dupilumab (Dupixent) 300 MG/2ML solution auto-injector injection       EPINEPHrine (EPIPEN) 0.3 MG/0.3ML solution auto-injector injection       hydrocortisone 2.5 % cream       hydrOXYzine (ATARAX) 25 MG tablet Take 2 tablets by mouth At Night As Needed for Itching, Allergies or Anxiety. 60 tablet 3    ketoconazole (NIZORAL) 2 % shampoo       meloxicam (MOBIC) 15 MG tablet Take 1 tablet by mouth Daily. Take once daily. 30 tablet 0    multivitamin with minerals (Multivitamin Adult) tablet tablet Take 1 tablet by mouth Daily for 360 days. 90 tablet 3    sertraline (Zoloft) 50 MG tablet Take 1 tablet by mouth Daily for 60 days. 30 tablet 1    triamcinolone (KENALOG) 0.1 % cream       valACYclovir (VALTREX) 500 MG  "tablet Take 1 tablet by mouth 2 (Two) Times a Day. 6 tablet 7     No current facility-administered medications on file prior to visit.       Objective   Resp 20   Ht 165.1 cm (65\")   Wt 95.3 kg (210 lb)   LMP 2025 (Exact Date)   BMI 34.95 kg/m²     Foot/Ankle Exam    GENERAL  Appearance:  appears stated age  Orientation:  AAOx3  Affect:  appropriate  Gait:  unimpaired  Assistance:  independent  Right shoe gear: casual shoe  Left shoe gear: casual shoe    VASCULAR     Right Foot Vascularity   Dorsalis pedis:  2+  Posterior tibial:  2+  Skin temperature:  warm  Edema gradin+  CFT:  < 3 seconds  Pedal hair growth:  Present  Varicosities:  none     Left Foot Vascularity   Dorsalis pedis:  2+  Posterior tibial:  2+  Skin temperature:  warm  Edema gradin+  CFT:  < 3 seconds  Pedal hair growth:  Present  Varicosities:  none     NEUROLOGIC     Right Foot Neurologic   Normal sensation    Light touch sensation: normal  Vibratory sensation: normal  Hot/Cold sensation: normal  Normal reflexes    Achilles reflex:  2+  Babinski reflex:  2+     Left Foot Neurologic   Normal sensation    Light touch sensation: normal  Vibratory sensation: normal  Hot/Cold sensation:  normal  Normal reflexes    Achilles reflex:  2+  Babinski reflex:  2+    MUSCULOSKELETAL     Right Foot Musculoskeletal   Ecchymosis:  none  Tenderness:  plantar fascia tenderness and retrocalcaneal bursa tenderness       Left Foot Musculoskeletal   Ecchymosis:  none  Tenderness:  plantar fascia tenderness and retrocalcaneal bursa tenderness    MUSCLE STRENGTH     Right Foot Muscle Strength   Normal strength    Foot dorsiflexion:  5  Foot plantar flexion:  5  Foot inversion:  5  Foot eversion:  5     Left Foot Muscle Strength   Normal strength    Foot dorsiflexion:  5  Foot plantar flexion:  5  Foot inversion:  5  Foot eversion:  5    RANGE OF MOTION     Right Foot Range of Motion   Foot and ankle ROM within normal limits    Ankle dorsiflexion: " decreased      Left Foot Range of Motion   Foot and ankle ROM within normal limits    Ankle dorsiflexion: decreased    DERMATOLOGIC      Right Foot Dermatologic   Nails  1.  Normal.  2.  Normal.  3.  Normal.  4.  Normal.  5.  Normal.     Left Foot Dermatologic   Nails  1.  Normal.  2.  Normal.  3.  Normal.  4.  Normal.  5.  Normal.        Assessment & Plan   Diagnoses and all orders for this visit:    1. Bilateral foot pain (Primary)    2. Plantar fasciitis    3. Equinus contracture of left ankle    4. Equinus contracture of right ankle       Patient progressing well with home stretching exercises, power step inserts, meloxicam oral anti-inflammatory.  We will refill patient's meloxicam at today's visit.  Patient will continue stretching exercises as prescribed.  Patient waiting on Hoka shoes to be delivered.  Also discussed with patient night splints versus [place] sock.  We will hold off on steroid injection at this visit.      Patient to take meloxicam for the next week to 2 weeks with taper off after that point.    Patient will follow-up in 3 to 4 weeks and if continued pain we will perform steroid injection at that time.             Procedures     Adriano Riggs DPM

## 2025-04-30 ENCOUNTER — TELEPHONE (OUTPATIENT)
Dept: OBSTETRICS AND GYNECOLOGY | Age: 30
End: 2025-04-30

## 2025-04-30 NOTE — TELEPHONE ENCOUNTER
Provider: OLI MILLS     Caller: Mary Sun    Relationship to Patient: Self    Pharmacy: [facility]    Phone Number: [phone]     Reason for Call: PT STATED SHE IS HAVING SYMPTOMS SIMILAR TO LAST TIME - A LOT OF ITCHING - WOULD LIKE TO KNOW IF SHE CAN COME IN THIS WEEK TO DO A U/C TO HAVE A RX SENT IN TO HELP     PLEASE CALL PT TO DISCUSS     THANK YOU

## 2025-04-30 NOTE — TELEPHONE ENCOUNTER
Spoke with pt,  she is would like for you to treat her again for the bv and then after she finishes that she would like the cream for external itching.

## 2025-05-01 RX ORDER — CLOTRIMAZOLE AND BETAMETHASONE DIPROPIONATE 10; .64 MG/G; MG/G
1 CREAM TOPICAL 2 TIMES DAILY
Qty: 15 G | Refills: 0 | Status: SHIPPED | OUTPATIENT
Start: 2025-05-01

## 2025-05-01 RX ORDER — METRONIDAZOLE 500 MG/1
500 TABLET ORAL 2 TIMES DAILY
Qty: 14 TABLET | Refills: 0 | Status: SHIPPED | OUTPATIENT
Start: 2025-05-01 | End: 2025-05-08

## 2025-05-06 ENCOUNTER — OFFICE VISIT (OUTPATIENT)
Dept: BEHAVIORAL HEALTH | Facility: CLINIC | Age: 30
End: 2025-05-06
Payer: COMMERCIAL

## 2025-05-06 DIAGNOSIS — F33.0 MILD EPISODE OF RECURRENT MAJOR DEPRESSIVE DISORDER: ICD-10-CM

## 2025-05-06 DIAGNOSIS — F41.1 GENERALIZED ANXIETY DISORDER: Primary | ICD-10-CM

## 2025-05-06 NOTE — TREATMENT PLAN
Multi-Disciplinary Problems (from Behavioral Health Treatment Plan)      Active Problems       Problem: Anxiety  Start Date: 05/06/25      Problem Details: The patient self-scales this problem as a 6 with 10 being the worst.          Goal Priority Start Date Expected End Date End Date    Patient will develop and implement behavioral and cognitive strategies to reduce anxiety and irrational fears. High 05/06/25 11/04/25 --    Goal Details: Progress toward goal:  Not appropriate to rate progress toward goal since this is the initial treatment plan.        Goal Intervention Frequency Start Date End Date    Help patient explore past emotional issues in relation to present anxiety. Q2 Weeks 05/06/25 --    Intervention Details: Duration of treatment until discharged.        Goal Intervention Frequency Start Date End Date    Help patient develop an awareness of their cognitive and physical responses to anxiety. Q2 Weeks 05/06/25 --    Intervention Details: Duration of treatment until discharged.                Problem: Trauma and Stressor Related Disorders  Start Date: 05/06/25      Problem Details: The patient self-scales this problem as a 3 with 10 being the worst.          Goal Priority Start Date Expected End Date End Date    Patient will process and move through trauma in a way that improves self regard and the patients ability to function optimally in the world around them. High 05/06/25 11/04/25 --    Goal Details: Progress toward goal:  Not appropriate to rate progress toward goal since this is the initial treatment plan.        Goal Intervention Frequency Start Date End Date    Assist patient in identifying ways that trauma has negatively impacted their view of themselves and the world. Q2 Weeks 05/06/25 --    Intervention Details: Duration of treatment until discharged.        Goal Intervention Frequency Start Date End Date    Process trauma in the context of the safe session environment. Q2 Weeks 05/06/25 --     Intervention Details: Duration of treatment until discharged.        Goal Intervention Frequency Start Date End Date    Develop a plan of behavior changes that will reduce the stress of the trauma. Q2 Weeks 05/06/25 --    Intervention Details: Duration of treatment until discharged.                        Reviewed By       Puja Newberry LCSW 05/06/25 [id number]                     I have discussed and reviewed this treatment plan with the patient.

## 2025-05-06 NOTE — PATIENT INSTRUCTIONS
Homework assignment: Practice mindfulness exercises at least daily.  Incorporate vagus nerve exercises daily as well to help soothe your emotions.  Complete mindfulness worksheet.
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
## 2025-05-13 ENCOUNTER — OFFICE VISIT (OUTPATIENT)
Age: 30
End: 2025-05-13
Payer: COMMERCIAL

## 2025-05-13 VITALS — RESPIRATION RATE: 20 BRPM | BODY MASS INDEX: 34.99 KG/M2 | HEIGHT: 65 IN | WEIGHT: 210 LBS

## 2025-05-13 DIAGNOSIS — M24.572 EQUINUS CONTRACTURE OF LEFT ANKLE: ICD-10-CM

## 2025-05-13 DIAGNOSIS — M24.571 EQUINUS CONTRACTURE OF RIGHT ANKLE: ICD-10-CM

## 2025-05-13 DIAGNOSIS — M72.2 PLANTAR FASCIITIS: Primary | ICD-10-CM

## 2025-05-13 RX ORDER — METHYLPREDNISOLONE 4 MG/1
TABLET ORAL
Qty: 21 TABLET | Refills: 0 | Status: SHIPPED | OUTPATIENT
Start: 2025-05-13

## 2025-05-14 NOTE — PROGRESS NOTES
05/13/2025  Foot and Ankle Surgery - Established Patient/Follow-up  Provider: Dr. Jaime Riggs DPM  Location: [facility]    Subjective:  Mary Sun is a 29 y.o. female following up for bilateral arch pain, heel pain, ankle pain.  Patient states she has had some improvement.  Patient has been performing stretching exercises daily.  Patient just received Hoka shoes with power step inserts.  She has been wearing them for about a week.  Patient is been taking meloxicam daily.  Patient states she still has pain to her ankles and her knees at the end of the day working.    Chief Complaint   Patient presents with    Left Foot - Follow-up, Pain     Entire bottom of both feet - 5 months - went to PT was shown stretching exercises  Still having pain but not every day - got new shoes - having pain in ankles and lower back now    Right Foot - Follow-up, Pain     Entire bottom of both feet - 5 months - went to PT was shown stretching exercises    Follow-up     PCP: John Cardoza APRN  Last PCP Visit: 3/19/25       Allergies   Allergen Reactions    Nuts Anaphylaxis and Hives     Tree nuts    Amoxil [Amoxicillin] Hives    Flagyl [Metronidazole] Hives    Penicillins Hives       Current Outpatient Medications on File Prior to Visit   Medication Sig Dispense Refill    betamethasone valerate (VALISONE) 0.1 % ointment       clobetasol (TEMOVATE) 0.05 % external solution       clotrimazole-betamethasone (LOTRISONE) 1-0.05 % cream Apply 1 Application topically to the appropriate area as directed 2 (Two) Times a Day. 15 g 0    Dupilumab (Dupixent) 300 MG/2ML solution auto-injector injection       EPINEPHrine (EPIPEN) 0.3 MG/0.3ML solution auto-injector injection       hydrocortisone 2.5 % cream       hydrOXYzine (ATARAX) 25 MG tablet Take 2 tablets by mouth At Night As Needed for Itching, Allergies or Anxiety. 60 tablet 3    ketoconazole (NIZORAL) 2 % shampoo       meloxicam (MOBIC) 15 MG tablet Take 1 tablet by  History of Presenting Illness:  Patient seen for follow-up.  Patient went down for MRI of the brain.  On returning patient was noted to have frothing from her mouth.  Patient has a Dobbhoff tube in place and there was concern that could this be due to regurgitation of food.  However on close examination seems like there was no significant regurgitation of food.  Patient also had an episode of vomiting and seizure-like activity last night for which she was given 1 g of IV Keppra.  Patient currently not arousable to loud verbal stimulation.  No jerking movements noted.      Vitals:   Vitals:    03/07/24 0738   BP:    Pulse: 86   Resp:    Temp:           Examination:  Stuporous, not responsive to loud verbal stimulation, intermittently withdraws to painful stimulation.  Pupils round and reactive to light, doll's eye movement present, corneal reflex present, no gross facial weakness noted.  No spontaneous movement of upper or lower extremity noted.  Patient does slightly grimace and withdraws to painful stimulation.  Plantars bilaterally upgoing.      Investigations:    MRI BRAIN (W+WO) (CPT=70553)    Result Date: 3/7/2024  CONCLUSION:  1. No acute infarct, acute intracranial hemorrhage, or hydrocephalus. 2. Mild chronic small vessel ischemic disease within the cerebral white matter and yenny. 3. There are 2 punctate foci of enhancement within the left cerebellum.  This may represent volume-averaging artifact or vascular enhancement.  However, a follow-up brain MRI in 6-8 weeks is recommended to exclude an underlying lesion.   LOCATION:  [id number]    Dictated by (CST): Stromberg, LeRoy, MD on 3/07/2024 at 10:55 AM     Finalized by (CST): Stromberg, LeRoy, MD on 3/07/2024 at 11:06 AM       XR CHEST AP PORTABLE  (CPT=71045)    Result Date: 3/7/2024  CONCLUSION:  1. The reticular densities in lower lungs are probably a combination of postinflammatory scarring and atelectasis. 2. Dobbhoff tube is noted to extend into abdomen  "mouth Daily. Take once daily. 30 tablet 0    multivitamin with minerals (Multivitamin Adult) tablet tablet Take 1 tablet by mouth Daily for 360 days. 90 tablet 3    sertraline (Zoloft) 50 MG tablet Take 1 tablet by mouth Daily for 60 days. 30 tablet 1    triamcinolone (KENALOG) 0.1 % cream       valACYclovir (VALTREX) 500 MG tablet Take 1 tablet by mouth 2 (Two) Times a Day. 6 tablet 7     No current facility-administered medications on file prior to visit.       Objective   Resp 20   Ht 165.1 cm (65\")   Wt 95.3 kg (210 lb)   LMP 2025 (Exact Date)   BMI 34.95 kg/m²     Foot/Ankle Exam    GENERAL  Appearance:  appears stated age  Orientation:  AAOx3  Affect:  appropriate  Gait:  unimpaired  Assistance:  independent  Right shoe gear: casual shoe  Left shoe gear: casual shoe    VASCULAR     Right Foot Vascularity   Dorsalis pedis:  2+  Posterior tibial:  2+  Skin temperature:  warm  Edema gradin+  CFT:  < 3 seconds  Pedal hair growth:  Present  Varicosities:  none     Left Foot Vascularity   Dorsalis pedis:  2+  Posterior tibial:  2+  Skin temperature:  warm  Edema gradin+  CFT:  < 3 seconds  Pedal hair growth:  Present  Varicosities:  none     NEUROLOGIC     Right Foot Neurologic   Normal sensation    Light touch sensation: normal  Vibratory sensation: normal  Hot/Cold sensation: normal  Normal reflexes    Achilles reflex:  2+  Babinski reflex:  2+     Left Foot Neurologic   Normal sensation    Light touch sensation: normal  Vibratory sensation: normal  Hot/Cold sensation:  normal  Normal reflexes    Achilles reflex:  2+  Babinski reflex:  2+    MUSCULOSKELETAL     Right Foot Musculoskeletal   Ecchymosis:  none  Tenderness:  plantar fascia tenderness and retrocalcaneal bursa tenderness       Left Foot Musculoskeletal   Ecchymosis:  none  Tenderness:  plantar fascia tenderness and retrocalcaneal bursa tenderness    MUSCLE STRENGTH     Right Foot Muscle Strength   Normal strength    Foot " below the inferior margin of the image.  Preliminary report was reviewed and there is no significant discrepancy.    LOCATION:  [facility]      Dictated by (CST): Mayank Burleson MD on 3/07/2024 at 7:28 AM     Finalized by (CST): Mayank Burleson MD on 3/07/2024 at 7:30 AM       XR CHEST AP PORTABLE  (CPT=71045)    Result Date: 3/6/2024  CONCLUSION:  Dobbhoff tube terminates in the distal stomach.   LOCATION:  [facility]      Dictated by (CST): Stromberg, LeRoy, MD on 3/06/2024 at 5:51 AM     Finalized by (CST): Stromberg, LeRoy, MD on 3/06/2024 at 5:52 AM       XR CHEST AP PORTABLE  (CPT=71045)    Result Date: 3/5/2024  CONCLUSION:    Feeding tube present with the tip in the stomach.  LOCATION:  [facility]      Dictated by (CST): Tab Huang MD on 3/05/2024 at 8:52 PM     Finalized by (CST): Tab Huang MD on 3/05/2024 at 8:53 PM            Lab Results   Component Value Date    A1C 5.8 (H) 01/27/2023        Lab Results   Component Value Date    LDL 25 02/15/2024    HDL 60 (H) 02/15/2024    TRIG 75 02/15/2024        Recent Labs   Lab 03/03/24  0506 03/04/24  0412 03/06/24  0802   RBC 2.85* 2.89* 3.08*   HGB 9.1* 9.2* 9.7*   HCT 28.0* 29.4* 30.8*   MCV 98.2 101.7* 100.0   MCH 31.9 31.8 31.5   MCHC 32.5 31.3 31.5   RDW 14.0 14.0 14.5   WBC 7.2 7.7 8.2   .0 299.0 306.0       Recent Labs   Lab 03/05/24  0539 03/06/24  0802 03/07/24  0645   * 202* 268*   BUN 58* 65* 73*   CREATSERUM 2.15* 2.33* 2.47*   EGFRCR 23* 21* 19*   CA 9.0 9.1 8.5    137 135*   K 5.0 4.6 5.0    109 109   CO2 24.0 23.0 20.0*         Impression/MDM.    Partial complex status epilepticus.  Patient did have few electrographic seizures on continuous EEG monitoring.  Also noted to have at least 2 clinical seizures.  Clobazam was added.  Patient to also continue Vimpat, Keppra, valproic acid and Topamax.  Will increase clobazam today.  Patient's daughter is not going to be available today, although previously planned that she was going  dorsiflexion:  5  Foot plantar flexion:  5  Foot inversion:  5  Foot eversion:  5     Left Foot Muscle Strength   Normal strength    Foot dorsiflexion:  5  Foot plantar flexion:  5  Foot inversion:  5  Foot eversion:  5    RANGE OF MOTION     Right Foot Range of Motion   Foot and ankle ROM within normal limits    Ankle dorsiflexion: decreased      Left Foot Range of Motion   Foot and ankle ROM within normal limits    Ankle dorsiflexion: decreased    DERMATOLOGIC      Right Foot Dermatologic   Nails  1.  Normal.  2.  Normal.  3.  Normal.  4.  Normal.  5.  Normal.     Left Foot Dermatologic   Nails  1.  Normal.  2.  Normal.  3.  Normal.  4.  Normal.  5.  Normal.        Assessment & Plan   Diagnoses and all orders for this visit:    1. Plantar fasciitis (Primary)  -     Ambulatory Referral to Physical Therapy for Evaluation & Treatment    2. Equinus contracture of left ankle  -     Ambulatory Referral to Physical Therapy for Evaluation & Treatment    3. Equinus contracture of right ankle  -     Ambulatory Referral to Physical Therapy for Evaluation & Treatment    Other orders  -     methylPREDNISolone (MEDROL) 4 MG dose pack; Take as directed  Dispense: 21 tablet; Refill: 0       Patient has been progressing though not where she wants to be as far as pain relief.  Discussed with patient the chronicity of the injury and that it may take some time to get improvement.  Discussed steroid injections to bilateral plantar fascia.  Patient wants to hold off on injections at this time.  We will prescribe Medrol Dosepak.  Will also refer patient to physical therapy for evaluation and treatment of her plantar fasciitis and ankle equinus.  Patient to continue to wear more supportive shoes and inserts.  Patient will continue performing home stretching exercises.  Patient will follow-up in 4 to 6 weeks.         Procedures     Adriano Riggs DPM   to come in today for further discussion regarding long-term care.  Since patient is already on 5 antiepileptic medication requires more discussed the case with the critical care consultant and will transfer the patient to the ICU for further management since adding another antiepileptic drug may significantly increase the risk of respiratory depression.  Will continue to monitor EEG.  MRI of the brain did not show significant intracranial enhancement and or other lesions.    History of stroke.  Continue aspirin and statin.  Cytosis.  On folic acid supplementation.  Encephalopathy, multifactorial, complicated by underlying dementia, seizures, toxic/metabolic etiology.  Awaiting family members to discuss long-term care plan.      A total of 32 minutes of critical care time (exclusive of billable procedures) was administered to manage and/or prevent neurologic instability. This involved direct patient intervention, complex decision making, and/or extensive discussions with the patient, family, and clinical staff

## 2025-06-02 ENCOUNTER — TELEPHONE (OUTPATIENT)
Dept: PHYSICAL THERAPY | Facility: CLINIC | Age: 30
End: 2025-06-02

## 2025-06-02 ENCOUNTER — OFFICE VISIT (OUTPATIENT)
Dept: BEHAVIORAL HEALTH | Facility: CLINIC | Age: 30
End: 2025-06-02
Payer: COMMERCIAL

## 2025-06-02 DIAGNOSIS — F33.0 MILD EPISODE OF RECURRENT MAJOR DEPRESSIVE DISORDER: ICD-10-CM

## 2025-06-02 DIAGNOSIS — F41.1 GENERALIZED ANXIETY DISORDER: Primary | ICD-10-CM

## 2025-06-02 PROCEDURE — 90834 PSYTX W PT 45 MINUTES: CPT | Performed by: COUNSELOR

## 2025-06-02 NOTE — PATIENT INSTRUCTIONS
Homework assignment: Continue to use mindfulness exercises and breathing to observe your thoughts, feelings and body sensations.  Begin to check the facts of what your emotional mind is telling you and use wise mind to make the best decision for yourself given the information you have in the moment.

## 2025-06-02 NOTE — PROGRESS NOTES
"Date: June 2, 2025  Time In: 3 PM  Time Out: 3:45 PM      PROGRESS NOTE  Data:  Mary Sun is a 29 y.o. female who presents today for individual therapy session at [facility] with Nieves Newberry LCSW.     Patient Chief Complaint: \"I generally do been doing pretty good.\"    Clinical Maneuvering/Intervention:        The patient presents for evaluation of anxiety.    Interim History: She reports a generally positive mood, with mindfulness exercises proving beneficial. However, she has been unable to engage in as many activities as desired due to time constraints. She has been incorporating stretching and breathing exercises into her routine, which she finds helpful. She also engages in reading when possible. She acknowledges occasional anxiety-driven thoughts, particularly when contemplating worst-case scenarios. She is actively working on identifying the triggers for these thoughts and engaging in self-reflection to discern their positive or negative nature. She makes a conscious effort to dismiss negative thoughts upon their emergence. She admits to struggling with sleep over the past few days, attributing this to racing thoughts and excessive phone use. She expresses a desire to reduce her phone usage and increase her engagement in arts and crafts activities. She also mentions concerns about her job.    Social History:  - Lives in [place] [facility] [place]  - Has a young child  - Works at a bakery  - Attends Confucianism events    Substance Use: She reports a history of smoking, which has affected her breathing capacity.    SOCIAL HISTORY  The patient has a history of smoking.       (Scales based on 0 - 10 with 10 being the worst)  Depression: 5 Anxiety: 5       Assisted patient in processing above session content; acknowledged and normalized patient’s thoughts, feelings, and concerns. Rationalized patient thought process regarding managing when her emotions become overwhelming. Discussed triggers associated " with patient's anxiety and depressive symptoms. Also discussed coping skills for patient to implement such as continued use of mindfulness, breathing exercises, checking the facts and utilizing wise mind..    Allowed patient to freely discuss issues without interruption or judgment. Provided safe, confidential environment to facilitate the development of positive therapeutic relationship and encourage open, honest communication. Assisted patient in identifying risk factors which would indicate the need for higher level of care including thoughts to harm self or others and/or self-harming behavior and encouraged patient to contact this office, call 911, or present to the nearest emergency room should any of these events occur. Discussed crisis intervention services and means to access. Patient adamantly and convincingly denies current suicidal or homicidal ideation or perceptual disturbance.    Assessment   Patient appears to maintain relative stability as compared to their baseline. However, patient continues to struggle with anxiety and depressive symptoms which continues to cause impairment in important areas of functioning. A result, they can be reasonably expected to continue to benefit from treatment and would likely be at increased risk for decompensation otherwise.    Mental Status Exam:   Hygiene:   good  Cooperation:  Cooperative  Eye Contact:  Downcast  Psychomotor Behavior:  Slow  Affect:  Restricted and Blunted  Mood: sad and depressed  Speech:  Normal  Thought Process:  Goal directed  Thought Content:  Normal  Suicidal:  None  Homicidal:  None  Hallucinations:  None  Delusion:  None  Memory:  Intact  Orientation:  Person, Place, Time, and Situation  Reliability:  good  Insight:  Good  Judgement:  Good  Impulse Control:  Good  Physical/Medical Issues:  No      PHQ-Score Total:  PHQ-9 Total Score:      PHQ-9 Depression Screening  Little interest or pleasure in doing things?     Feeling down, depressed, or  hopeless?     PHQ-2 Total Score     Trouble falling or staying asleep, or sleeping too much?     Feeling tired or having little energy?     Poor appetite or overeating?     Feeling bad about yourself - or that you are a failure or have let yourself or your family down?     Trouble concentrating on things, such as reading the newspaper or watching television?     Moving or speaking so slowly that other people could have noticed? Or the opposite - being so fidgety or restless that you have been moving around a lot more than usual?     Thoughts that you would be better off dead, or of hurting yourself in some way?     PHQ-9 Total Score     If you checked off any problems, how difficult have these problems made it for you to do your work, take care of things at home, or get along with other people?         LUCINDA-7 Total Score:         Patient's Support Network Includes:  parents, extended family, and friends    Functional Status: No impairment    Progress toward goal: Not at goal    Prognosis: Good with Ongoing Treatment        Problems:  - Anxiety    Content of Therapy:  During the session, the patient discussed her experiences with mindfulness and its impact on her daily life. She mentioned challenges with time management and feelings of fatigue. The conversation included strategies for managing anxiety, such as breathing exercises, reframing negative thoughts, and grounding techniques. The importance of respecting emotions while not allowing them to dictate actions was emphasized. The patient also shared her struggles with sleep and the impact of visually stimulating activities, like scrolling on her phone, on her ability to relax.    Clinical Impression:  The patient's anxiety appears to be improving, as evidenced by her enhanced mood and positive feedback on mindfulness practices. She is actively engaging in self-reflection and attempting to reframe negative thoughts. Despite occasional racing thoughts and sleep  disturbances, she is making progress in managing her anxiety through the techniques discussed.    Therapeutic Intervention:  - Mindfulness and deep breathing exercises  - Reframing negative thoughts  - Grounding exercises  - Encouragement to engage in arts and crafts activities  - Journaling approximately an hour before bedtime    Plan:  - Continue practicing mindfulness and deep breathing exercises  - Engage in grounding exercises  - Avoid visually stimulating activities, such as scrolling on her phone, especially at night  - Participate in arts and crafts activities  - Journal approximately an hour before bedtime    Follow-up:  The patient will follow up in 2 weeks.    Notes & Risk Factors:  *            Plan     Resources: Patient was provided with the following community resources: None at this time    Patient will continue in individual outpatient therapy with focus on improved functioning and coping skills, maintaining stability, and avoiding decompensation and the need for higher level of care.    Patient will adhere to any medication regimens as prescribed and report any side effects. Patient will contact this office, call 911 or present to the nearest emergency room should suicidal or homicidal ideations occur. Provide cognitive behavioral therapy and solution focused therapy to improve functioning, maintain stability and avoid decompensation and the need for higher level of care.     Return at first available appointment or earlier if symptoms worsen or fail to improve.           VISIT DIAGNOSIS:     ICD-10-CM ICD-9-CM   1. Generalized anxiety disorder  F41.1 300.02   2. Mild episode of recurrent major depressive disorder  F33.0 296.31        Patient was given instructions and counseling regarding her condition or for health maintenance advice. Please see specific information pulled into the AVS if appropriate.     Patient or patient representative verbalized consent for the use of Ambient Listening during  the visit with  Puja Newberry LCSW for chart documentation. 6/2/2025  16:08 EDT     This document has been electronically signed by Puja Newberry LCSW   June 2, 2025 16:08 EDT      Part of this note may be an electronic transcription/translation of spoken language to printed text using the Dragon Dictation System.

## 2025-06-09 ENCOUNTER — TREATMENT (OUTPATIENT)
Dept: PHYSICAL THERAPY | Facility: CLINIC | Age: 30
End: 2025-06-09
Payer: COMMERCIAL

## 2025-06-09 DIAGNOSIS — M25.562 ACUTE PAIN OF BOTH KNEES: ICD-10-CM

## 2025-06-09 DIAGNOSIS — M25.561 ACUTE PAIN OF BOTH KNEES: ICD-10-CM

## 2025-06-09 DIAGNOSIS — M79.671 PAIN IN BOTH FEET: ICD-10-CM

## 2025-06-09 DIAGNOSIS — M24.572 EQUINUS CONTRACTURE OF LEFT ANKLE: ICD-10-CM

## 2025-06-09 DIAGNOSIS — M72.2 PLANTAR FASCIITIS: Primary | ICD-10-CM

## 2025-06-09 DIAGNOSIS — M54.50 ACUTE BILATERAL LOW BACK PAIN WITHOUT SCIATICA: ICD-10-CM

## 2025-06-09 DIAGNOSIS — M79.672 PAIN IN BOTH FEET: ICD-10-CM

## 2025-06-09 DIAGNOSIS — M24.571 EQUINUS CONTRACTURE OF RIGHT ANKLE: ICD-10-CM

## 2025-06-09 PROCEDURE — 97535 SELF CARE MNGMENT TRAINING: CPT | Performed by: PHYSICAL THERAPIST

## 2025-06-09 PROCEDURE — 97110 THERAPEUTIC EXERCISES: CPT | Performed by: PHYSICAL THERAPIST

## 2025-06-09 PROCEDURE — 97161 PT EVAL LOW COMPLEX 20 MIN: CPT | Performed by: PHYSICAL THERAPIST

## 2025-06-09 NOTE — PROGRESS NOTES
Physical Therapy Initial Evaluation and Plan of Care  [facility] [address], [address]  [place] [address]     Patient: Mary Sun   : 1995  Referring practitioner: Adriano Riggs DPM  Date of Initial Visit: 2025  Today's Date: 2025  Patient seen for 1 sessions  PT Clinic location: [address], [address]  [id number]          Visit Diagnoses:    ICD-10-CM ICD-9-CM   1. Plantar fasciitis  M72.2 728.71   2. Pain in both feet  M79.671 729.5    M79.672    3. Equinus contracture of left ankle  M24.572 718.47   4. Equinus contracture of right ankle  M24.571 718.47   5. Acute bilateral low back pain without sciatica  M54.50 724.2     338.19   6. Acute pain of both knees  M25.561 338.19    M25.562 719.46       Subjective Questionnaire: LEFS: 73/80    Subjective Evaluation    History of Present Illness  Mechanism of injury: The patient is a 29 y.o. female presenting to today's evaluation with B foot pain. She has been having pain for a while. She states that she walked on her toes her entire life. She notes onset of foot pain in October. She notes improved foot pain since getting Hokas with power step inserts about a month ago. Her pain then shifted up to the lateral aspect of B knees about 3-4 weeks ago, which was around the time she got the new shoes. She states that the knee pain has resolved as well but now she has pain in her low back. She denies any radicular pain, numbness, or tingling. She has had intermittent pain in her back for 2-3 weeks. Her pain goes across her entire lower back. She has been taking meloxicam for her feet and has noticed that it has helped the back as well. She states she has not had to take it as much as her foot pain has improved. She feels like her alignment has shifted since getting her new shoes and her back is not straight anymore. She describes her pain as burning in her  back and feet. She returns to the MD this afternoon.       Aggravating factors: standing, sitting  Alleviating factors: stretching, sitting after prolonged standing  Imaging: x-ray WNL     Occupation: Works at a [employer]  Prior level of function: independent without difficulty    Current Activity/Functional limitations:  She notes difficulty with standing for any length of time. She also notes difficulty with transitioning to stand from sitting and first thing in the morning. She also notes difficulty with prolonged sitting and standing at work. She also notes difficulty with walking as it flares up her back. She denies any difficulty with sleeping.         Pain  Current pain ratin (0 in back, 1 in foot)  At best pain ratin (th past few days have been 0/10)  At worst pain ratin (not sharp but burning.)    Patient Goals  Patient goals for therapy: decreased pain and independence with ADLs/IADLs         Medical history: anemia. See chart for further detail.       Objective          Postural Observations    Additional Postural Observation Details  POSTURE: wide base of support, genu valgus, increased lumbar lordosis, slight L hip elevation with shoes on and barefoot, increased lumbar lordosis, over pronation B, rear foot valgus on L;   PALPATION: denies tenderness with palpation of the lumbar spine, B plantar region, and B calves;   GAIT: over pronation B, genu valgus B, B hip drop;     Active Range of Motion     Lumbar   Flexion: 100 degrees   Extension: 50 degrees   Left lateral flexion: 60 degrees   Right lateral flexion: 60 degrees   Left rotation: 50 degrees   Right rotation: 50 degrees   Left Hip   External rotation (90/90): 45 degrees   Internal rotation (90/90): 45 degrees     Right Hip   External rotation (90/90): 45 degrees   Internal rotation (90/90): 45 (felt it in hip) degrees   Left Knee   Flexion: 135 degrees   Extension: 0 degrees     Right Knee   Flexion: 135 degrees   Extension: 0 degrees    Left Ankle/Foot   Dorsiflexion (ke): 5 degrees   Plantar flexion: 70 degrees   Inversion: 30 degrees   Eversion: 2 degrees     Right Ankle/Foot   Dorsiflexion (ke): 7 degrees   Plantar flexion: 70 degrees   Inversion: 35 degrees   Eversion: 35 degrees     Additional Active Range of Motion Details  Lacking dorsiflexion on L and R    Strength/Myotome Testing     Left Hip   Planes of Motion   Flexion: 5  External rotation: 4+  Internal rotation: 3+    Right Hip   Planes of Motion   Flexion: 5  External rotation: 3+  Internal rotation: 4    Left Knee   Flexion: 5  Extension: 5    Right Knee   Flexion: 5  Extension: 5    Left Ankle/Foot   Dorsiflexion: 5  Plantar flexion: 5  Inversion: 5 (sore on lateral aspect of ankle)  Eversion: 5    Right Ankle/Foot   Dorsiflexion: 5  Plantar flexion: 5  Inversion: 5  Eversion: 5    Additional Strength Details  Upper abdominal: 4/5  Lower abdominal: 3+/5          Assessment & Plan       Assessment  Impairments: abnormal gait, abnormal or restricted ROM, activity intolerance, impaired balance, impaired physical strength, lacks appropriate home exercise program and pain with function   Functional limitations: walking, uncomfortable because of pain and standing   Assessment details: The patient is a 29 y.o. female who presents to physical therapy today for B foot pain, B knee pain, and low back pain. Upon initial evaluation, the patient demonstrates the following impairments: LE weakness, decreased ankle ROM, core weakness, impaired postural alignment, and impaired gait mechanics. Knee and back pain is most likely a result of adjusting to new shoes and changes in posture as a result of new shoes.      Due to these impairments, the patient is unable to perform or has difficulty with the following functional tasks: walking, standing, transitioning to stand, and prolonged sitting. The patient would benefit from skilled PT services to address functional limitations and impairments and to  improve patient quality of life.    Prognosis: good    Goals  Plan Goals: ST. Pt will be independent and compliant with initial HEP in 3 weeks.  2. Pt will report a 20% improvement in symptoms since starting therapy in 3 weeks.  3. Pt will demonstrate an increase in dorsiflexion ROM to 0 degrees within 3 weeks.   4. Pt will report pain level at worst <5 while walking in 3 weeks.    LT. Pt will be independent with final HEP for self-management of condition by DC.  2. Pt will improve score on LEFS to greater than 75/80 by DC.   3. Pt will report a 50% improvement in symptoms by DC in order to allow return to PLOF.  4. Pt will improve LE strength to 5/5 in order to improve ankle mechanics when negotiating stairs and when ambulating.       Plan  Therapy options: will be seen for skilled therapy services  Planned modality interventions: cryotherapy, electrical stimulation/Russian stimulation, thermotherapy (hydrocollator packs), ultrasound and TENS  Planned therapy interventions: abdominal trunk stabilization, ADL retraining, balance/weight-bearing training, body mechanics training, flexibility, functional ROM exercises, gait training, home exercise program, joint mobilization, manual therapy, motor coordination training, neuromuscular re-education, orthotic fitting/training, postural training, soft tissue mobilization, spinal/joint mobilization, strengthening, stretching and therapeutic activities  Frequency: 2x month  Treatment plan discussed with: patient      Access Code: [email or web address]  URL: [email or web address]  Date: 2025  Prepared by: Saadia Welch     See flowsheets for treatment detail.    Education on anatomy, pathology, home exercise program, and home management is provided.      History # of Personal Factors and/or Comorbidities: LOW (0)  Examination of Body System(s): # of elements: LOW (1-2)  Clinical Presentation: STABLE   Clinical Decision Making: LOW       Timed:         Manual  Therapy:         mins  50676;     Therapeutic Exercise:    12     mins  95275;     Neuromuscular James:        mins  34919;    Therapeutic Activity:          mins  62651;     Gait Training:           mins  60157;     Ionto                                   mins   26954  Self Care                       12     mins   35606      Un-Timed:  Electrical Stimulation:         mins  60643 ( );  Dry Needling          mins self-pay  Traction          mins 97989  Low Eval     15     Mins  90768  Mod Eval          Mins  72427  High Eval                            Mins  24501  Re-Eval                               mins  72020      Timed Treatment:   24   mins   Total Treatment:     50   mins    PT SIGNATURE: Saadia Welch PT   [place] PT license #: [id number]  DATE TREATMENT INITIATED: 6/9/2025    Initial Certification  Certification Period: 9/6/2025  I certify that the therapy services are furnished while this patient is under my care.  The services outlined above are required by this patient, and will be reviewed every 90 days.    PHYSICIAN: Adriano Riggs DPM  NPI: [id number]                                      DATE:     Please sign and return via fax to [facility] - Fax #: [phone]. Thank you, [facility].

## 2025-06-16 ENCOUNTER — OFFICE VISIT (OUTPATIENT)
Dept: BEHAVIORAL HEALTH | Facility: CLINIC | Age: 30
End: 2025-06-16
Payer: COMMERCIAL

## 2025-06-16 DIAGNOSIS — F33.0 MILD EPISODE OF RECURRENT MAJOR DEPRESSIVE DISORDER: ICD-10-CM

## 2025-06-16 DIAGNOSIS — F41.1 GENERALIZED ANXIETY DISORDER: Primary | ICD-10-CM

## 2025-06-16 NOTE — PATIENT INSTRUCTIONS
Homework assignment: Use mindfulness to recognize when you begin comparing your self to others, check the facts of what your thoughts are saying, contain the anxious negative thoughts in your containment box and sooth with activities that bring you peace.

## 2025-06-16 NOTE — PROGRESS NOTES
"Date: June 16, 2025  Time In: 2 PM  Time Out: 3 PM      PROGRESS NOTE  Data:  Mary Sun is a 29 y.o. female who presents today for individual therapy session at [facility] with Nieves Newberry LCSW.     Patient Chief Complaint: \"I been doing okay but I have been struggling with getting triggered.\"    Clinical Maneuvering/Intervention:        The patient presents for evaluation of anxiety and insecurity.    She reports experiencing heightened insecurity, particularly in social settings such as concerts, where she finds herself comparing her appearance to others. This has been a persistent issue throughout her life, with certain aspects causing more distress than others. She expresses a desire to feel attractive without the need for external validation and to be less concerned about others' opinions. She also mentions a tendency to overreact emotionally, which she is actively trying to manage. She acknowledges that her reactions are often triggered by specific comments or situations, making it challenging for her to disengage from her emotional state. She finds solace in music and scripture and has attempted vagal nerve exercises, which she found beneficial. She also enjoys spending time in nature and sunlight, although she recently experienced sunburn for the first time. She is currently volunteering at a [employer] and is considering joining a choir.    Social History:  - Volunteering at a [employer]  - Considering joining a choir    She was diagnosed with herpes at the age of 19, which has significantly impacted her self-esteem and relationships. She has not been sexually active for some time and has never been in a committed relationship. She is currently single and does not feel ready for a relationship due to her self-perception.       (Scales based on 0 - 10 with 10 being the worst)  Depression: 7 Anxiety: 8       Assisted patient in processing above session content; acknowledged and normalized " patient’s thoughts, feelings, and concerns. Rationalized patient thought process regarding poor self-esteem and negative self worth. Discussed triggers associated with patient's anxiety and depressive symptoms. Also discussed coping skills for patient to implement such as using mindfulness to recognize thoughts, feelings and body sensations as well as utilizing checking the facts and use of wise mind.  Also discussed utilizing distress tolerance skills were when feeling overwhelmed and honed in on a negative thought.    Allowed patient to freely discuss issues without interruption or judgment. Provided safe, confidential environment to facilitate the development of positive therapeutic relationship and encourage open, honest communication. Assisted patient in identifying risk factors which would indicate the need for higher level of care including thoughts to harm self or others and/or self-harming behavior and encouraged patient to contact this office, call 911, or present to the nearest emergency room should any of these events occur. Discussed crisis intervention services and means to access. Patient adamantly and convincingly denies current suicidal or homicidal ideation or perceptual disturbance.    Assessment   Patient appears to maintain relative stability as compared to their baseline. However, patient continues to struggle with anxiety and depressive symptoms which continues to cause impairment in important areas of functioning. A result, they can be reasonably expected to continue to benefit from treatment and would likely be at increased risk for decompensation otherwise.    Mental Status Exam:   Hygiene:   good  Cooperation:  Cooperative  Eye Contact:  Good  Psychomotor Behavior:  Appropriate  Affect:  Restricted and Blunted  Mood: sad, depressed, and anxious  Speech:  Normal  Thought Process:  Goal directed  Thought Content:  Normal  Suicidal:  None  Homicidal:  None  Hallucinations:  None  Delusion:   None  Memory:  Intact  Orientation:  Person, Place, Time, and Situation  Reliability:  good  Insight:  Good  Judgement:  Good  Impulse Control:  Good  Physical/Medical Issues:  No            Patient's Support Network Includes:  mother, extended family, and friends    Functional Status: No impairment    Progress toward goal: Not at goal    Prognosis: Good with Ongoing Treatment        Problems:  - Anxiety  - Insecurity  - Herpes    Content of Therapy:  During the session, the patient discussed her feelings of insecurity, particularly in social situations such as concerts. She acknowledged her tendency to compare herself to others and the negative impact this has on her self-esteem. The therapist explored the patient's past experiences and how they have contributed to her current thought patterns. Techniques such as radical acceptance, paced breathing, and bilateral stimulation were discussed to help manage anxiety and promote emotional regulation. The patient also expressed concerns about her diagnosis of herpes and its impact on her relationships and self-worth.    Clinical Impression:  The patient exhibits significant anxiety and insecurity, which are likely rooted in past traumas and negative thought patterns. She demonstrates insight into her emotional responses and is motivated to challenge and reframe her thoughts. Her anxiety and insecurity are exacerbated by her diagnosis of herpes, which contributes to feelings of self-doubt and fear of rejection. The patient is responsive to therapeutic interventions and shows a willingness to engage in practices that promote emotional regulation and self-compassion.    Therapeutic Intervention:  - Radical acceptance: Encouraging the patient to accept reality on its own terms and focus on managing her condition effectively.  - Bilateral stimulation exercises: Techniques such as the butterfly hug to soothe the vagus nerve and promote a sense of safety.  - Paced breathing and  alternating nostril breathing: Methods to help manage anxiety and promote a balanced emotional state.  - Mindfulness and grounding techniques: Practices to help the patient stay present and manage feelings of insecurity.    Plan:  - Practice paced breathing and alternating nostril breathing daily.  - Engage in bilateral stimulation exercises when feeling overwhelmed.  - Focus on positive affirmations and self-compassion.  - Avoid comparing herself to others and concentrate on her unique qualities.  - Participate in activities that bring luan and peace, such as nature walks or listening to soothing music.  - Consider volunteering and forming meaningful relationships to promote self-esteem.  - Have open and honest conversations with potential partners about her condition when she feels comfortable.    Follow-up:  The patient is scheduled for a follow-up visit on 06/30/2025.    Notes & Risk Factors:  - History of herpes contributing to feelings of insecurity and anxiety.  - Potential triggers related to past traumas and negative thought patterns.  - Protective factors include the patient's insight, motivation, and willingness to engage in therapeutic practices.            Plan     Resources: Patient was provided with the following community resources: None at this time    Patient will continue in individual outpatient therapy with focus on improved functioning and coping skills, maintaining stability, and avoiding decompensation and the need for higher level of care.    Patient will adhere to any medication regimens as prescribed and report any side effects. Patient will contact this office, call 911 or present to the nearest emergency room should suicidal or homicidal ideations occur. Provide cognitive behavioral therapy and solution focused therapy to improve functioning, maintain stability and avoid decompensation and the need for higher level of care.     Return at first available appointment or earlier if symptoms  worsen or fail to improve.           VISIT DIAGNOSIS:     ICD-10-CM ICD-9-CM   1. Generalized anxiety disorder  F41.1 300.02   2. Mild episode of recurrent major depressive disorder  F33.0 296.31        Patient was given instructions and counseling regarding her condition or for health maintenance advice. Please see specific information pulled into the AVS if appropriate.     Patient or patient representative verbalized consent for the use of Ambient Listening during the visit with  Puja Newberry LCSW for chart documentation. 6/16/2025  16:02 EDT     This document has been electronically signed by Puja Newberry LCSW   June 16, 2025 16:02 EDT      Part of this note may be an electronic transcription/translation of spoken language to printed text using the Dragon Dictation System.

## 2025-06-23 ENCOUNTER — OFFICE VISIT (OUTPATIENT)
Age: 30
End: 2025-06-23
Payer: COMMERCIAL

## 2025-06-23 ENCOUNTER — OFFICE VISIT (OUTPATIENT)
Dept: FAMILY MEDICINE CLINIC | Facility: CLINIC | Age: 30
End: 2025-06-23
Payer: COMMERCIAL

## 2025-06-23 ENCOUNTER — TREATMENT (OUTPATIENT)
Dept: PHYSICAL THERAPY | Facility: CLINIC | Age: 30
End: 2025-06-23
Payer: COMMERCIAL

## 2025-06-23 VITALS
BODY MASS INDEX: 36.06 KG/M2 | DIASTOLIC BLOOD PRESSURE: 78 MMHG | OXYGEN SATURATION: 98 % | TEMPERATURE: 98 F | HEART RATE: 97 BPM | HEIGHT: 65 IN | SYSTOLIC BLOOD PRESSURE: 120 MMHG | WEIGHT: 216.4 LBS

## 2025-06-23 VITALS — WEIGHT: 210 LBS | RESPIRATION RATE: 20 BRPM | HEIGHT: 65 IN | BODY MASS INDEX: 34.99 KG/M2

## 2025-06-23 DIAGNOSIS — M72.2 PLANTAR FASCIITIS: Primary | ICD-10-CM

## 2025-06-23 DIAGNOSIS — R21 RASH: Primary | ICD-10-CM

## 2025-06-23 DIAGNOSIS — M25.562 ACUTE PAIN OF BOTH KNEES: ICD-10-CM

## 2025-06-23 DIAGNOSIS — M24.572 EQUINUS CONTRACTURE OF LEFT ANKLE: ICD-10-CM

## 2025-06-23 DIAGNOSIS — M54.50 ACUTE BILATERAL LOW BACK PAIN WITHOUT SCIATICA: ICD-10-CM

## 2025-06-23 DIAGNOSIS — R41.840 ATTENTION DEFICIT: Chronic | ICD-10-CM

## 2025-06-23 DIAGNOSIS — M79.671 PAIN IN BOTH FEET: ICD-10-CM

## 2025-06-23 DIAGNOSIS — M25.561 ACUTE PAIN OF BOTH KNEES: ICD-10-CM

## 2025-06-23 DIAGNOSIS — M79.672 PAIN IN BOTH FEET: ICD-10-CM

## 2025-06-23 DIAGNOSIS — F40.10 SOCIAL ANXIETY DISORDER: Chronic | ICD-10-CM

## 2025-06-23 DIAGNOSIS — M24.571 EQUINUS CONTRACTURE OF RIGHT ANKLE: ICD-10-CM

## 2025-06-23 DIAGNOSIS — F33.0 MILD EPISODE OF RECURRENT MAJOR DEPRESSIVE DISORDER: Chronic | ICD-10-CM

## 2025-06-23 DIAGNOSIS — Z13.220 LIPID SCREENING: ICD-10-CM

## 2025-06-23 PROBLEM — Z00.00 ANNUAL PHYSICAL EXAM: Status: RESOLVED | Noted: 2025-03-19 | Resolved: 2025-06-23

## 2025-06-23 PROCEDURE — 99214 OFFICE O/P EST MOD 30 MIN: CPT | Performed by: STUDENT IN AN ORGANIZED HEALTH CARE EDUCATION/TRAINING PROGRAM

## 2025-06-23 PROCEDURE — 99213 OFFICE O/P EST LOW 20 MIN: CPT | Performed by: PODIATRIST

## 2025-06-23 PROCEDURE — 97112 NEUROMUSCULAR REEDUCATION: CPT | Performed by: PHYSICAL THERAPIST

## 2025-06-23 PROCEDURE — 97110 THERAPEUTIC EXERCISES: CPT | Performed by: PHYSICAL THERAPIST

## 2025-06-23 PROCEDURE — 97530 THERAPEUTIC ACTIVITIES: CPT | Performed by: PHYSICAL THERAPIST

## 2025-06-23 RX ORDER — ELECTROLYTES/DEXTROSE
1 SOLUTION, ORAL ORAL DAILY
Qty: 90 TABLET | Refills: 3 | Status: SHIPPED | OUTPATIENT
Start: 2025-06-23 | End: 2026-06-18

## 2025-06-23 NOTE — PROGRESS NOTES
Physical Therapy Daily Treatment Note  [facility] [place]  [address], [address]  [place] [id number]     Patient: Mary Sun  : 1995  Referring practitioner: Adriano Riggs DPM  Today's Date: 2025    VISIT#: 2    Visit Diagnoses:    ICD-10-CM ICD-9-CM   1. Plantar fasciitis  M72.2 728.71   2. Pain in both feet  M79.671 729.5    M79.672    3. Equinus contracture of left ankle  M24.572 718.47   4. Equinus contracture of right ankle  M24.571 718.47   5. Acute bilateral low back pain without sciatica  M54.50 724.2     338.19   6. Acute pain of both knees  M25.561 338.19    M25.562 719.46       Subjective   Mary Sun reports: that she has not had any pain since her last visit. She states that her feet, knees, and back are feeling good.      Objective       See Exercise, Manual, and Modality Logs for complete treatment.         Assessment/Plan  The patient tolerates the treatment session without increase in symptoms. She requires minimal verbal cueing for technique throughout the session and is able to correct her form. She notes a strong stretch with calf stretching. She finds strength exercises to be difficult but denies pain. She would continue to benefit from skilled intervention with focus on strength and flexibility.       Progress per Plan of Care          Timed:         Manual Therapy:         mins  90653;     Therapeutic Exercise:    15     mins  85284;     Neuromuscular James:    15    mins  67477;    Therapeutic Activity:     15     mins  60857;     Gait Training:           mins  63147;      Ionto:                                   mins  27998  Self Care:                            mins  87351    Un-Timed:  Electrical Stimulation:         mins  09724 ( );  Dry Needling          mins self-pay  Traction          mins 65021  Re-Eval                               mins  97137  Group Therapy           ___ mins 87227    Timed Treatment:   45    mins   Total Treatment:     45   mins    Saadia Welch PT  Physical Therapist  [place] [facility] license #: [id number]

## 2025-06-23 NOTE — PROGRESS NOTES
"Chief Complaint  office visit (3 mon follow up /Skin breaking out over the top of the body, over the lips and nose /Hands flare up. /)    Subjective        Mary Sun presents to [facility]  History of Present Illness  29-year-old -American female here for chronic disease management follow-up visit.      Pt c/o itchy flaky skin of face and upper torso. Pt states she also had skin breakdown of her chest area after she has worn a dress about 2 months ago.  I have instructed patient to follow-up with her dermatologist for any skin concerns as patient is already on medications per dermatology for skin issues.    Pt has appt with dermatology in 2 days.  Pt s/p podiatry for her feet issues and improved on Hokas shoes and inserts and also s/p PT.    Instructed patient to get the adult preventive immunization that are due at  the pharmacy, including - COVID booster .    Pt states last PAP smear this year with Gyn and it was wnl.    Patient report her mental health condition is under control.  Patient denies suicidal homicidal ideation.        Objective   Vital Signs:  /78 (BP Location: Left arm, Patient Position: Sitting, Cuff Size: Adult)   Pulse 97   Temp 98 °F (36.7 °C)   Ht 165.1 cm (65\")   Wt 98.2 kg (216 lb 6.4 oz)   SpO2 98%   BMI 36.01 kg/m²   Estimated body mass index is 36.01 kg/m² as calculated from the following:    Height as of this encounter: 165.1 cm (65\").    Weight as of this encounter: 98.2 kg (216 lb 6.4 oz).               Physical Exam  Constitutional:       Appearance: Normal appearance.   HENT:      Head: Normocephalic and atraumatic.   Eyes:      Conjunctiva/sclera: Conjunctivae normal.   Cardiovascular:      Rate and Rhythm: Normal rate and regular rhythm.      Heart sounds: Normal heart sounds.   Pulmonary:      Effort: Pulmonary effort is normal.      Breath sounds: Normal breath sounds.   Abdominal:      General: Bowel sounds are normal.      " Palpations: Abdomen is soft.      Comments: Non-tender   Skin:     General: Skin is warm.   Neurological:      General: No focal deficit present.      Mental Status: She is alert and oriented to person, place, and time.   Psychiatric:         Mood and Affect: Mood normal.         Behavior: Behavior normal.        Result Review :    The following data was reviewed by: PB Terry on 06/23/2025:  CMP          11/26/2024    11:49 1/15/2025    14:42   CMP   Glucose 108  85    BUN 12  11    Creatinine 0.70  0.69    EGFR 121.7  121    Sodium 138  140    Potassium 3.3  4.1    Chloride 101  103    Calcium 8.9  9.5    Total Protein 7.1     Albumin 4.4     Globulin 2.7     Total Bilirubin 0.3     Alkaline Phosphatase 75     AST (SGOT) 20     ALT (SGPT) 21     Albumin/Globulin Ratio 1.6     BUN/Creatinine Ratio 17.1  16    Anion Gap 10.7       CBC          11/26/2024    11:49   CBC   WBC 4.83    RBC 3.97    Hemoglobin 12.2    Hematocrit 35.2    MCV 88.7    MCH 30.7    MCHC 34.7    RDW 11.8    Platelets 230                Data reviewed : Consultant notes reviewed podiatrist consult note for foot pain and the plan was conservative measures including inserts and appropriate shoes and night splints.  Also reviewed behavioral health consult note from June 2025 and patient was given therapeutic interventions that she could practice on her own             Assessment and Plan     Diagnoses and all orders for this visit:    1. Rash (Primary)  Assessment & Plan:  Instructed patient to follow-up with her dermatologist and maintain her dermatology appointments.    Orders:  -     KARLA by IFA, Reflex to Titer and Pattern; Future  -     KARLA by IFA, Reflex 9-biomarkers profile; Future  -     Sedimentation Rate; Future  -     C-reactive Protein; Future  -     TSH; Future  -     T4, free; Future  -     Comprehensive Metabolic Panel; Future    2. Lipid screening  Assessment & Plan:  Discussed the importance of healthy diet,  nutrition, and lifestyle. Recommend low salt, fat/cholesterol diet and avoid concentrated sweets. Encouraged DASH diet along with fresh fruits & vegetables and low fat dairy products. Counseled patient to exercise/walk as tolerated. Avoid tobacco and alcohol use.      Orders:  -     Lipid Panel; Future    3. Attention deficit  Assessment & Plan:  Stable    Orders:  -     TSH; Future  -     T4, free; Future    4. Mild episode of recurrent major depressive disorder  Assessment & Plan:  Stable.  Patient denies suicidal homicidal ideation.    Orders:  -     sertraline (Zoloft) 50 MG tablet; Take 1 tablet by mouth Daily for 60 days.  Dispense: 30 tablet; Refill: 11    5. Social anxiety disorder  Assessment & Plan:  Stable    Orders:  -     sertraline (Zoloft) 50 MG tablet; Take 1 tablet by mouth Daily for 60 days.  Dispense: 30 tablet; Refill: 11    Other orders  -     multivitamin with minerals (Multivitamin Adult) tablet tablet; Take 1 tablet by mouth Daily for 360 days.  Dispense: 90 tablet; Refill: 3      All chronic conditions have been addressed and treated by the practice or other specialists. Medications have been reconciled and refilled as appropriate. Reiterated compliance and timely follow up appointments. Side effects of all new and old medications reviewed with the patient and patient willing to accept all risks involved. Advised RTO if no improvement or worsening of symptoms or if any new complaints arise. Patient advised to follow up with clinic or call after diagnostic tests, if patient does not hear from office 3 days after the test completion.            Follow Up     Return in about 6 months (around 12/23/2025) for Next scheduled follow up.  Patient was given instructions and counseling regarding her condition or for health maintenance advice. Please see specific information pulled into the AVS if appropriate.

## 2025-06-24 NOTE — PROGRESS NOTES
06/23/2025  Foot and Ankle Surgery - Established Patient/Follow-up  Provider: Dr. Jaime Riggs DPM  Location: [facility]    Subjective:  Mary Sun is a 29 y.o. female following up for right plantar fasciitis, right equinus contracture of the ankle.  Patient states pain has improved since her previous visit.  Patient has been more diligent with stretching, supportive shoes, inserts.  Patient is still taking meloxicam 15 mg daily.  Patient states she still has some pain though tolerable and improved.    Chief Complaint   Patient presents with    Left Foot - Follow-up, Pain     Entire bottom of both feet - 5 months - went to PT was shown stretching exercises  Doing much better - got new shoes     Right Foot - Follow-up, Pain     Entire bottom of both feet - 5 months - went to PT was shown stretching exercises    Follow-up     PCP: John Cardoza APRN  Last PCP Visit: 3/19/25       Allergies   Allergen Reactions    Nuts Anaphylaxis and Hives     Tree nuts    Amoxil [Amoxicillin] Hives    Flagyl [Metronidazole] Hives    Penicillins Hives       Current Outpatient Medications on File Prior to Visit   Medication Sig Dispense Refill    betamethasone valerate (VALISONE) 0.1 % ointment       clobetasol (TEMOVATE) 0.05 % external solution       clotrimazole-betamethasone (LOTRISONE) 1-0.05 % cream Apply 1 Application topically to the appropriate area as directed 2 (Two) Times a Day. 15 g 0    Dupilumab (Dupixent) 300 MG/2ML solution auto-injector injection       EPINEPHrine (EPIPEN) 0.3 MG/0.3ML solution auto-injector injection       hydrocortisone 2.5 % cream       hydrOXYzine (ATARAX) 25 MG tablet Take 2 tablets by mouth At Night As Needed for Itching, Allergies or Anxiety. 60 tablet 3    ketoconazole (NIZORAL) 2 % shampoo       meloxicam (MOBIC) 15 MG tablet Take 1 tablet by mouth Daily. Take once daily. 30 tablet 0    triamcinolone (KENALOG) 0.1 % cream       valACYclovir (VALTREX) 500 MG tablet  "Take 1 tablet by mouth 2 (Two) Times a Day. 6 tablet 7    multivitamin with minerals (Multivitamin Adult) tablet tablet Take 1 tablet by mouth Daily for 360 days. 90 tablet 3    sertraline (Zoloft) 50 MG tablet Take 1 tablet by mouth Daily for 60 days. 30 tablet 11     No current facility-administered medications on file prior to visit.       Objective   Resp 20   Ht 165.1 cm (65\")   Wt 95.3 kg (210 lb)   LMP 2025 (Approximate)   BMI 34.95 kg/m²     Foot/Ankle Exam    GENERAL  Appearance:  appears stated age  Orientation:  AAOx3  Affect:  appropriate  Gait:  unimpaired  Assistance:  independent  Right shoe gear: casual shoe  Left shoe gear: casual shoe    VASCULAR     Right Foot Vascularity   Dorsalis pedis:  2+  Posterior tibial:  2+  Skin temperature:  warm  Edema gradin+  CFT:  < 3 seconds  Pedal hair growth:  Present  Varicosities:  none     Left Foot Vascularity   Dorsalis pedis:  2+  Posterior tibial:  2+  Skin temperature:  warm  Edema gradin+  CFT:  < 3 seconds  Pedal hair growth:  Present  Varicosities:  none     NEUROLOGIC     Right Foot Neurologic   Normal sensation    Light touch sensation: normal  Vibratory sensation: normal  Hot/Cold sensation: normal  Normal reflexes    Achilles reflex:  2+  Babinski reflex:  2+     Left Foot Neurologic   Normal sensation    Light touch sensation: normal  Vibratory sensation: normal  Hot/Cold sensation:  normal  Normal reflexes    Achilles reflex:  2+  Babinski reflex:  2+    MUSCULOSKELETAL     Right Foot Musculoskeletal   Ecchymosis:  none  Tenderness:  plantar fascia tenderness and retrocalcaneal bursa tenderness       Left Foot Musculoskeletal   Ecchymosis:  none  Tenderness:  plantar fascia tenderness and retrocalcaneal bursa tenderness    MUSCLE STRENGTH     Right Foot Muscle Strength   Normal strength    Foot dorsiflexion:  5  Foot plantar flexion:  5  Foot inversion:  5  Foot eversion:  5     Left Foot Muscle Strength   Normal strength  "   Foot dorsiflexion:  5  Foot plantar flexion:  5  Foot inversion:  5  Foot eversion:  5    RANGE OF MOTION     Right Foot Range of Motion   Foot and ankle ROM within normal limits    Ankle dorsiflexion: decreased      Left Foot Range of Motion   Foot and ankle ROM within normal limits    Ankle dorsiflexion: decreased    DERMATOLOGIC      Right Foot Dermatologic   Nails  1.  Normal.  2.  Normal.  3.  Normal.  4.  Normal.  5.  Normal.     Left Foot Dermatologic   Nails  1.  Normal.  2.  Normal.  3.  Normal.  4.  Normal.  5.  Normal.        Assessment & Plan   Diagnoses and all orders for this visit:    1. Plantar fasciitis (Primary)    2. Equinus contracture of right ankle       Diagnosis and treatment options for patient's plantar fasciitis discussed today.  Etiologies of plantar fasciitis discussed including flatfeet, high arches, tight muscles in your calves and heels.  Discussed with patient most will get relief of symptoms with conservative treatment but that surgical options are available.  Discussed conservative and surgical options.  Patient will pursue conservative treatment at this time.    Patient will continue stretching exercises daily.  Patient will continue wearing supportive athletic shoes and arch supports.    Discussed steroid injection at this visit.  Patient will hold off due to continued improvement.  Patient understands if any rebound or recurrence of pain we perform steroid injection at that time.    Patient to taper off oral anti-inflammatory medication.  Patient still taking meloxicam 15 mg.    Discussed interventions including night splints, LowDye taping, physical therapy referral.  We will hold off on any physical therapy referral at this time.    Patient will follow-up in 4 to 6 weeks if continued pain and for steroid injection.             Procedures     Adriano Riggs DPM

## 2025-07-01 ENCOUNTER — OFFICE VISIT (OUTPATIENT)
Dept: BEHAVIORAL HEALTH | Facility: CLINIC | Age: 30
End: 2025-07-01
Payer: COMMERCIAL

## 2025-07-01 DIAGNOSIS — F33.0 MILD EPISODE OF RECURRENT MAJOR DEPRESSIVE DISORDER: ICD-10-CM

## 2025-07-01 DIAGNOSIS — F41.1 GENERALIZED ANXIETY DISORDER: Primary | ICD-10-CM

## 2025-07-01 NOTE — PROGRESS NOTES
"Date: July 1, 2025  Time In: 1 PM  Time Out: 1:55 PM      PROGRESS NOTE  Data:  Mary Sun is a 29 y.o. female who presents today for individual therapy session at [facility] with Nieves Newberry LCSW.     Patient Chief Complaint: \"I am feeling really good and feeling much better.\"    Clinical Maneuvering/Intervention:        The patient presents for evaluation of anxiety.    She reports a change in her feelings, describing a sense of detachment and a reluctance to attend another concert unless accompanied by a familiar person. She has been engaging in bi-weekly meetings with her uncle, who provides mentorship and coaching. She is considering returning to school in the fall and has applied for financial aid. She recently visited her uncle, who encouraged her to document her goals. However, she struggles with computer use and feels indecisive about her future career path. She admits to occasional smoking marijuana over the past month but expresses a desire to quit. She acknowledges the need to keep herself occupied and has joined a [employer] choir. She is making efforts to improve her self-esteem and overcome past trauma. She has been participating in [employer] activities and volunteering, which she finds beneficial. She has also been reflecting on her past and is grateful for her current involvement in the [employer]. She is currently fasting and is trying to avoid seeking validation from others. She is attempting to dress differently and monitor her language. She is looking forward to making new friends and socializing more. She is trying to step out of her comfort zone and is working on improving her self-confidence. 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 She is considering returning to school in the fall and has applied for financial aid.    SOCIAL HISTORY  The patient admits to smoking cigarettes here and there and occasionally smoking marijuana. The " patient also mentions drinking alcohol but not to the point of intoxication.       (Scales based on 0 - 10 with 10 being the worst)  Depression: denies Anxiety: 3       Assisted patient in processing above session content; acknowledged and normalized patient’s thoughts, feelings, and concerns. Rationalized patient thought process regarding giving into urges and cravings. Discussed triggers associated with patient's anxiety and depressive symptoms. Also discussed coping skills for patient to implement such as continued use of mindfulness, breathing, using wise mind and urged surfing for cravings.    Allowed patient to freely discuss issues without interruption or judgment. Provided safe, confidential environment to facilitate the development of positive therapeutic relationship and encourage open, honest communication. Assisted patient in identifying risk factors which would indicate the need for higher level of care including thoughts to harm self or others and/or self-harming behavior and encouraged patient to contact this office, call 911, or present to the nearest emergency room should any of these events occur. Discussed crisis intervention services and means to access. Patient adamantly and convincingly denies current suicidal or homicidal ideation or perceptual disturbance.    Assessment   Patient appears to maintain relative stability as compared to their baseline. However, patient continues to struggle with anxiety and depressive symptoms which continues to cause impairment in important areas of functioning. A result, they can be reasonably expected to continue to benefit from treatment and would likely be at increased risk for decompensation otherwise.    Mental Status Exam:   Hygiene:   good  Cooperation:  Cooperative  Eye Contact:  Good  Psychomotor Behavior:  Appropriate  Affect:  Appropriate  Mood: normal  Speech:  Normal  Thought Process:  Goal directed  Thought Content:  Normal  Suicidal:   None  Homicidal:  None  Hallucinations:  None  Delusion:  None  Memory:  Intact  Orientation:  Person, Place, Time, and Situation  Reliability:  good  Insight:  Good  Judgement:  Good  Impulse Control:  Good  Physical/Medical Issues:  No      Patient's Support Network Includes:  parents, extended family, and friends    Functional Status: No impairment    Progress toward goal: Not at goal    Prognosis: Good with Ongoing Treatment        1. Anxiety.  She exhibits a positive attitude towards life, which is commendable. She was advised to maintain an open mind and embrace learning experiences. The importance of mindfulness in all aspects of life was emphasized. She was encouraged to prioritize her health and education, and to avoid making impulsive decisions. The concept of urge surfing was introduced as a potential tool for managing cravings and urges. She was also advised to gradually reintroduce fun into her life, while ensuring it does not interfere with her healing process.    Follow-up  A follow-up visit is scheduled in 2 weeks.            Plan     Resources: Patient was provided with the following community resources: None at this time    Patient will continue in individual outpatient therapy with focus on improved functioning and coping skills, maintaining stability, and avoiding decompensation and the need for higher level of care.    Patient will adhere to any medication regimens as prescribed and report any side effects. Patient will contact this office, call 911 or present to the nearest emergency room should suicidal or homicidal ideations occur. Provide cognitive behavioral therapy and solution focused therapy to improve functioning, maintain stability and avoid decompensation and the need for higher level of care.     Return at first available appointment or earlier if symptoms worsen or fail to improve.           VISIT DIAGNOSIS:     ICD-10-CM ICD-9-CM   1. Generalized anxiety disorder  F41.1 300.02   2.  Mild episode of recurrent major depressive disorder  F33.0 296.31        Patient was given instructions and counseling regarding her condition or for health maintenance advice. Please see specific information pulled into the AVS if appropriate.     Patient or patient representative verbalized consent for the use of Ambient Listening during the visit with  Puja Newberry LCSW for chart documentation. 7/1/2025  14:02 EDT     This document has been electronically signed by Puja Newberry LCSW   July 1, 2025 14:02 EDT      Part of this note may be an electronic transcription/translation of spoken language to printed text using the Dragon Dictation System.

## 2025-07-01 NOTE — PATIENT INSTRUCTIONS
Homework assignment: Continue to use mindfulness, deep breathing, checking the facts and utilizing wise mind to make the best decision for yourself in the moment.  Begin to address areas of vulnerability utilizing ABC please and urge surfing.

## 2025-07-07 ENCOUNTER — TREATMENT (OUTPATIENT)
Dept: PHYSICAL THERAPY | Facility: CLINIC | Age: 30
End: 2025-07-07
Payer: COMMERCIAL

## 2025-07-07 DIAGNOSIS — M24.572 EQUINUS CONTRACTURE OF LEFT ANKLE: ICD-10-CM

## 2025-07-07 DIAGNOSIS — M79.672 PAIN IN BOTH FEET: ICD-10-CM

## 2025-07-07 DIAGNOSIS — M25.561 ACUTE PAIN OF BOTH KNEES: ICD-10-CM

## 2025-07-07 DIAGNOSIS — M72.2 PLANTAR FASCIITIS: Primary | ICD-10-CM

## 2025-07-07 DIAGNOSIS — M24.571 EQUINUS CONTRACTURE OF RIGHT ANKLE: ICD-10-CM

## 2025-07-07 DIAGNOSIS — M79.671 PAIN IN BOTH FEET: ICD-10-CM

## 2025-07-07 DIAGNOSIS — M54.50 ACUTE BILATERAL LOW BACK PAIN WITHOUT SCIATICA: ICD-10-CM

## 2025-07-07 DIAGNOSIS — M25.562 ACUTE PAIN OF BOTH KNEES: ICD-10-CM

## 2025-07-07 PROCEDURE — 97110 THERAPEUTIC EXERCISES: CPT | Performed by: PHYSICAL THERAPIST

## 2025-07-07 PROCEDURE — 97530 THERAPEUTIC ACTIVITIES: CPT | Performed by: PHYSICAL THERAPIST

## 2025-07-07 PROCEDURE — 97112 NEUROMUSCULAR REEDUCATION: CPT | Performed by: PHYSICAL THERAPIST

## 2025-07-07 NOTE — PROGRESS NOTES
Physical Therapy Daily Treatment Note  [facility]  [address], [address]  [place] [id number]     Patient: Mary Sun  : 1995  Referring practitioner: Adriano Riggs DPM  Today's Date: 2025    VISIT#: 3    Visit Diagnoses:    ICD-10-CM ICD-9-CM   1. Plantar fasciitis  M72.2 728.71   2. Pain in both feet  M79.671 729.5    M79.672    3. Equinus contracture of left ankle  M24.572 718.47   4. Equinus contracture of right ankle  M24.571 718.47   5. Acute bilateral low back pain without sciatica  M54.50 724.2     338.19   6. Acute pain of both knees  M25.561 338.19    M25.562 719.46       Subjective   Mary Sun reports: that she is feeling a lot better. She had one day where she had some pain and thinks it was from walking a lot. She feels good this morning.       Objective       See Exercise, Manual, and Modality Logs for complete treatment.         Assessment/Plan  The patient tolerates the treatment session without increase symptoms. She requires minimal verbal cueing for technique throughout the session and is able to correct her form. She states that clamshells are not as difficult as previously, indicating improved strength. She is able to progress the intensity of SLR. She finds this challenging but denies pain. She is progressing well and would continue to benefit from skilled intervention with focus on LE strength and flexibility.       Progress per Plan of Care          Timed:         Manual Therapy:         mins  84819;     Therapeutic Exercise:    20     mins  84447;     Neuromuscular James:    10    mins  55323;    Therapeutic Activity:     15     mins  88321;     Gait Training:           mins  45649;      Ionto:                                   mins  03008  Self Care:                            mins  14456    Un-Timed:  Electrical Stimulation:         mins  01645 ( );  Dry Needling          mins self-pay  Traction          mins  30894  Re-[facility]                               mins  53488  Group Therapy           ___ mins 17333    Timed Treatment:   45   mins   Total Treatment:     45   mins    Saadia Welch PT  Physical Therapist  [place] license #: [id number]

## 2025-07-14 ENCOUNTER — OFFICE VISIT (OUTPATIENT)
Dept: BEHAVIORAL HEALTH | Facility: CLINIC | Age: 30
End: 2025-07-14
Payer: COMMERCIAL

## 2025-07-14 DIAGNOSIS — F33.0 MILD EPISODE OF RECURRENT MAJOR DEPRESSIVE DISORDER: ICD-10-CM

## 2025-07-14 DIAGNOSIS — F41.1 GENERALIZED ANXIETY DISORDER: Primary | ICD-10-CM

## 2025-07-14 PROCEDURE — 90837 PSYTX W PT 60 MINUTES: CPT | Performed by: COUNSELOR

## 2025-07-14 NOTE — PATIENT INSTRUCTIONS
Homework assignment: Continue to use mindfulness and wise mind to make effective and purposeful decisions for yourself.  Work on a time management budget to purposefully spend your time wisely.

## 2025-07-14 NOTE — PROGRESS NOTES
"Date: July 14, 2025  Time In: 1 PM  Time Out: 2 PM      PROGRESS NOTE  Data:  Mary Sun is a 29 y.o. female who presents today for individual therapy session at [facility] with Nieves Newberry LCSW.     Patient Chief Complaint: \"I have been doing really good feeling really good.\"    Clinical Maneuvering/Intervention:        The patient presents for evaluation of anxiety and depression.    She reports a general improvement in her mood, attributing it to a combination of factors. She has been considering getting lock extensions for her hair, which she believes will boost her confidence. Financial stress is a significant concern for her, as she feels it has been mismanaged recently. She is making efforts to budget her spending, including limiting her clothing purchases and focusing on essential items like pedicures. She has set up payment plans for her credit cards and is actively working on improving her credit score. She is also trying to be more punctual at work and is making an effort to dress nicely, which she finds boosts her confidence. She is currently in the process of applying for school and financial aid, with the aim of completing her bachelor's degree. She is involved in various [employer] activities and is working on improving her social skills. She is making an effort to spend more time outdoors and is planning a trip to [place] in a few weeks. She is also working on improving her time management skills and is trying to maintain a regular sleep schedule. She is making an effort to spend more quality time with her daughter and is considering enrolling her in gymnastics or art classes. She is also working on improving her self-discipline, particularly in relation to smoking. She has noticed that she tends to smoke when others around her are smoking, but she does not feel the need to smoke otherwise. She is making an effort to quit smoking and is working on improving her self-control. " She is also working on improving her physical health, including losing weight and eating healthier. She is making an effort to exercise regularly and is considering joining the [employer]. She is also working on improving her spiritual health, including reading the Bible regularly and attending Nond[employer]ational services. She is making an effort to manage her anxiety and depression, including seeking support from friends and family.    SOCIAL HISTORY  The patient smokes cigarettes occasionally, typically one or two a day, but does not feel the need to smoke when it is not readily available. The patient also smokes marijuana recreationally but is considering quitting due to its impact on productivity and radha.       (Scales based on 0 - 10 with 10 being the worst)  Depression: denies Anxiety: 3       Assisted patient in processing above session content; acknowledged and normalized patient’s thoughts, feelings, and concerns. Rationalized patient thought process regarding self-discipline and effectively using the time and resources she has. Discussed triggers associated with patient's anxiety and self doubt. Also discussed coping skills for patient to implement such as continued use of mindfulness and breathing, continued use of checking the facts of what anxiety tells us and making wise mind decisions.  Also utilizing radical acceptance as necessary.    Allowed patient to freely discuss issues without interruption or judgment. Provided safe, confidential environment to facilitate the development of positive therapeutic relationship and encourage open, honest communication. Assisted patient in identifying risk factors which would indicate the need for higher level of care including thoughts to harm self or others and/or self-harming behavior and encouraged patient to contact this office, call 911, or present to the nearest emergency room should any of these events occur. Discussed crisis intervention services and means to access.  Patient adamantly and convincingly denies current suicidal or homicidal ideation or perceptual disturbance.    Assessment   Patient appears to maintain relative stability as compared to their baseline. However, patient continues to struggle with anxiety and depressive symptoms which continues to cause impairment in important areas of functioning. A result, they can be reasonably expected to continue to benefit from treatment and would likely be at increased risk for decompensation otherwise.    Mental Status Exam:   Hygiene:   good  Cooperation:  Cooperative  Eye Contact:  Good  Psychomotor Behavior:  Appropriate  Affect:  Appropriate  Mood: normal  Speech:  Normal  Thought Process:  Goal directed  Thought Content:  Normal  Suicidal:  None  Homicidal:  None  Hallucinations:  None  Delusion:  None  Memory:  Intact  Orientation:  Person, Place, Time, and Situation  Reliability:  good  Insight:  Good  Judgement:  Good  Impulse Control:  Fair  Physical/Medical Issues:  No           Patient's Support Network Includes:  parents, extended family, and friends    Functional Status: No impairment    Progress toward goal: Not at goal    Prognosis: Good with Ongoing Treatment        1. Anxiety.  She reports experiencing occasional bouts of anxiety, particularly when she has a lot of tasks to complete in a short amount of time. She is encouraged to continue practicing mindfulness and budgeting her time effectively to manage anxiety. She is advised to maintain a regular sleep schedule and consider combining physical activities with her daughter to achieve fitness goals while spending quality time together.    2. Depression.  She reports no current symptoms of depression. She is encouraged to continue engaging in activities that improve her mood, such as Bible study and spending time with friends and family. She is also advised to be mindful of her thoughts and to challenge negative cognitions.    3. Smoking cessation.  She smokes  cigarettes occasionally, typically one or two a day, but does not feel the need to smoke when it is not readily available. She also smokes marijuana recreationally but is considering quitting due to its impact on productivity and radha. She is advised to be mindful of her smoking habits and to consider setting a plan to quit smoking. She is encouraged to explore alternative activities during breaks at work, such as reading her Bible or taking a walk.    4. Weight management.  She expresses a desire to lose weight and is considering dietary changes, such as reducing bread and sweets intake. She is advised to create a balanced diet plan and incorporate regular physical activity into her routine. She is also encouraged to consider meal prepping to save time and ensure healthy eating options are available.    5. Financial management.  She is experiencing financial stress and is working on budgeting her expenses. She is advised to continue being mindful of her spending and to consider using budgeting apps to track her finances. She is also encouraged to plan for future expenses, such as school fees and extracurricular activities for her daughter.    Follow-up  A follow-up visit is scheduled for next month.            Plan     Resources: Patient was provided with the following community resources: None at this time    Patient will continue in individual outpatient therapy with focus on improved functioning and coping skills, maintaining stability, and avoiding decompensation and the need for higher level of care.    Patient will adhere to any medication regimens as prescribed and report any side effects. Patient will contact this office, call 911 or present to the nearest emergency room should suicidal or homicidal ideations occur. Provide cognitive behavioral therapy and solution focused therapy to improve functioning, maintain stability and avoid decompensation and the need for higher level of care.     Return at first  available appointment or earlier if symptoms worsen or fail to improve.           VISIT DIAGNOSIS:     ICD-10-CM ICD-9-CM   1. Generalized anxiety disorder  F41.1 300.02   2. Mild episode of recurrent major depressive disorder  F33.0 296.31        Patient was given instructions and counseling regarding her condition or for health maintenance advice. Please see specific information pulled into the AVS if appropriate.     Patient or patient representative verbalized consent for the use of Ambient Listening during the visit with  Puja Newberry LCSW for chart documentation. 7/14/2025  14:59 EDT     This document has been electronically signed by Puja Newberry LCSW   July 14, 2025 13:59 EDT      Part of this note may be an electronic transcription/translation of spoken language to printed text using the Dragon Dictation System.

## 2025-08-18 ENCOUNTER — OFFICE VISIT (OUTPATIENT)
Dept: BEHAVIORAL HEALTH | Facility: CLINIC | Age: 30
End: 2025-08-18
Payer: COMMERCIAL

## 2025-08-18 DIAGNOSIS — F41.1 GENERALIZED ANXIETY DISORDER: Primary | ICD-10-CM

## 2025-08-18 DIAGNOSIS — F33.0 MILD EPISODE OF RECURRENT MAJOR DEPRESSIVE DISORDER: ICD-10-CM

## 2025-08-18 PROCEDURE — 90837 PSYTX W PT 60 MINUTES: CPT | Performed by: COUNSELOR

## 2025-08-21 DIAGNOSIS — A60.00 GENITAL HERPES SIMPLEX, UNSPECIFIED SITE: Chronic | ICD-10-CM

## 2025-08-21 RX ORDER — VALACYCLOVIR HYDROCHLORIDE 500 MG/1
500 TABLET, FILM COATED ORAL 2 TIMES DAILY
Qty: 6 TABLET | Refills: 7 | Status: SHIPPED | OUTPATIENT
Start: 2025-08-21

## (undated) DEVICE — SUT GUT CHRM 0 CTX 36IN 904H BX/36

## (undated) DEVICE — ANTIBACTERIAL UNDYED BRAIDED (POLYGLACTIN 910), SYNTHETIC ABSORBABLE SUTURE: Brand: COATED VICRYL

## (undated) DEVICE — PK C/SECT 40

## (undated) DEVICE — HYDROGEL COATED LATEX URINE METER FOLEY TRAY,16 FR/CH (5.3 MM), 5 ML CATHETER PRE-CONNECTED TO 2000 ML DRAINAGE BAG WITH NEEDLE SAMPLING: Brand: DOVER

## (undated) DEVICE — SOL IRR H2O BTL 1000ML STRL

## (undated) DEVICE — SUCTION CANISTER, 1000CC,SAFELINER: Brand: DEROYAL

## (undated) DEVICE — TRY SKINPREP DRYPREP

## (undated) DEVICE — GLV SURG SENSICARE W/ALOE PF LF 7.5 STRL

## (undated) DEVICE — APPL CHLORAPREP W/TINT 26ML ORNG

## (undated) DEVICE — SUT GUT CHRM 2/0 CT1 36IN 923H

## (undated) DEVICE — PREP SOL POVIDONE/IODINE BT 4OZ